# Patient Record
Sex: FEMALE | Race: WHITE | NOT HISPANIC OR LATINO | Employment: FULL TIME | URBAN - METROPOLITAN AREA
[De-identification: names, ages, dates, MRNs, and addresses within clinical notes are randomized per-mention and may not be internally consistent; named-entity substitution may affect disease eponyms.]

---

## 2017-03-26 ENCOUNTER — TELEPHONE (OUTPATIENT)
Dept: NURSING | Facility: CLINIC | Age: 42
End: 2017-03-26

## 2017-03-26 NOTE — TELEPHONE ENCOUNTER
Call Type: Triage Call    Presenting Problem: rectal bleeding.  'On friday I was leaking some  blood and pus from my butt. I thought it could be a hemorrhoid so I  soaked in a bath of epsom salts and started using preparation H on  Saturday and rested.  Today I am having some more bleeding again.'  Pt states the blood is bright red.  Protocol completed and care  advice given.  Disposition is to be seen within 2 weeks, pt will  schedule herself, declined assisance with appt.  Denies other  symptoms or trauma.  Denies cp, denies breathing problems, able to  care for self, scant amount, not saturating clothing, no clots in  toilet.  Never had this before.  Will follow care advice.  Triage Note:  Guideline Title: Gastrointestinal Bleeding  Recommended Disposition: See Provider within 2 Weeks  Original Inclination: Wanted to speak with a nurse  Override Disposition:  Intended Action: Follow advice given  Physician Contacted: No  New or recurring episodes of scant rectal bleeding (bright red blood on toilet  tissue or drops of blood in toilet water) AND not previously evaluated ?  YES  New or worsening signs and symptoms that may indicate shock ? NO  Vomited blood after nosebleed ? NO  Receiving or recently completed chemotherapy or radiation therapy AND more than  one episode of black or tarry stool, not blood streaked ? NO  Rectal foreign body with any amount of bright red bleeding ? NO  Following ingestion of toxic or caustic substance ? NO  Passing red, black or tarry material from rectum AND onset of new signs and  symptoms of hypovolemia ? NO  Unbearable abdominal/pelvic pain ? NO  Vomiting red, bloody or coffee-ground material, more than streaks of blood or  scant amount (not following nosebleed within past day) ? NO  Streaks of blood or scant blood in vomitus ? NO  One or more episodes of rectal bleeding (more than scant) and no symptoms of  hypovolemia ? NO  New change in bowel movements to black, tarry stool ?  NO  Unexplained, prolonged bleeding from gums AND not previously evaluated ? NO  New scant rectal bleeding associated with sharp rectal pain, lasting minutes to  hours, brought on by bowel movement AND not previously evaluated or not  responding to provider recommended treatment ? NO  Rectal bleeding or persistent rectal pain occuring after anal intercourse ? NO  Chest discomfort associated with shortness of breath, sweating, odd heartbeats or  different heart rate, nausea, vomiting, lightheadedness, or fainting lasting 5 or  more minutes now or within the last hour ? NO  Chest pain spreading to the shoulders, neck, jaw, in one or both arms, stomach or  back lasting 5 or more minutes now or within the last hour. Pain is NOT  associated with taking a deep breath or a productive cough, movement, or touch to  a localized area. ? NO  Pressure, fullness, squeezing sensation or pain anywhere in the chest lasting 5 or  more minutes now or within the last hour. Pain is NOT associated with taking a  deep breath or a productive cough, movement, or touch to a localized area on the  chest. ? NO  Bloody diarrhea AND has not been evaluated ? NO  Bleeding began after any gastrointestinal (GI) surgery or procedure and is lasting  longer than defined in provider specified discharge information ? NO  History of esophageal varices AND more than one episode of black or tarry stool ?  NO  Eight hours or less following abdominal or rectal injury AND having any amount  bright red rectal bleeding ? NO  Physician Instructions:  Care Advice: Call provider immediately if develop larger amounts of rectal  bleeding.  Constipation Care Measures:   - Drink 8 to 10 glasses of liquid per day,  more if breastfeeding.    - Drink warm water or coffee early in the  morning.   - Gradually increase dietary fiber (fresh fruits/vegetables,  whole grain bread and cereals). - As tolerated, walk 30 minutes at a steady  pace daily.    - Consider  nonprescription stool softeners (Colace) per  label, pharmacist or provider recommendations. Stool softners are not habit  forming as some stimulant laxatives may become.   - Consider  nonprescription bulk forming laxatives (such as Metamucil, FiberCon,  Citrucel, etc.)  follow package directions.  - Avoid routine use of strong  laxatives/enemas/suppositories unless ordered by provider.   - Do not delay  having bowel movement when having urge.   - Keep a routine  attempt bowel movement within half hour after a meal or after some  exercise.  Consider use of nonprescription anti-inflammatory topical medication per  pharmacist's or label recommendations (such as Preparation H, Anusol-HC,  Cortacet) to relieve itching or burning.  Apply small amount to area.

## 2017-04-04 ENCOUNTER — OFFICE VISIT (OUTPATIENT)
Dept: FAMILY MEDICINE | Facility: CLINIC | Age: 42
End: 2017-04-04
Payer: COMMERCIAL

## 2017-04-04 VITALS
SYSTOLIC BLOOD PRESSURE: 118 MMHG | HEART RATE: 77 BPM | HEIGHT: 66 IN | BODY MASS INDEX: 41.95 KG/M2 | RESPIRATION RATE: 18 BRPM | WEIGHT: 261 LBS | DIASTOLIC BLOOD PRESSURE: 76 MMHG | TEMPERATURE: 98.5 F | OXYGEN SATURATION: 99 %

## 2017-04-04 DIAGNOSIS — K62.5 RECTAL BLEEDING: Primary | ICD-10-CM

## 2017-04-04 DIAGNOSIS — L98.9 FACIAL LESION: ICD-10-CM

## 2017-04-04 DIAGNOSIS — E55.9 VITAMIN D DEFICIENCY: ICD-10-CM

## 2017-04-04 DIAGNOSIS — E66.01 MORBID OBESITY WITH BMI OF 40.0-44.9, ADULT (H): ICD-10-CM

## 2017-04-04 LAB
DEPRECATED CALCIDIOL+CALCIFEROL SERPL-MC: 15 UG/L (ref 20–75)
ERYTHROCYTE [DISTWIDTH] IN BLOOD BY AUTOMATED COUNT: 13.1 % (ref 10–15)
HCT VFR BLD AUTO: 43.2 % (ref 35–47)
HGB BLD-MCNC: 14.1 G/DL (ref 11.7–15.7)
MCH RBC QN AUTO: 30.5 PG (ref 26.5–33)
MCHC RBC AUTO-ENTMCNC: 32.6 G/DL (ref 31.5–36.5)
MCV RBC AUTO: 94 FL (ref 78–100)
PLATELET # BLD AUTO: 271 10E9/L (ref 150–450)
RBC # BLD AUTO: 4.62 10E12/L (ref 3.8–5.2)
WBC # BLD AUTO: 8.4 10E9/L (ref 4–11)

## 2017-04-04 PROCEDURE — 36415 COLL VENOUS BLD VENIPUNCTURE: CPT | Performed by: NURSE PRACTITIONER

## 2017-04-04 PROCEDURE — 85027 COMPLETE CBC AUTOMATED: CPT | Performed by: NURSE PRACTITIONER

## 2017-04-04 PROCEDURE — 82306 VITAMIN D 25 HYDROXY: CPT | Performed by: NURSE PRACTITIONER

## 2017-04-04 PROCEDURE — 99213 OFFICE O/P EST LOW 20 MIN: CPT | Performed by: NURSE PRACTITIONER

## 2017-04-04 NOTE — NURSING NOTE
"Chief Complaint   Patient presents with     Rectal Problem     Skin Check       Initial /76  Pulse 77  Temp 98.5  F (36.9  C) (Tympanic)  Resp 18  Ht 5' 6\" (1.676 m)  Wt 261 lb (118.4 kg)  SpO2 99%  BMI 42.13 kg/m2 Estimated body mass index is 42.13 kg/(m^2) as calculated from the following:    Height as of this encounter: 5' 6\" (1.676 m).    Weight as of this encounter: 261 lb (118.4 kg).  Medication Reconciliation: complete     Anastasiya Cabrera MA      "

## 2017-04-04 NOTE — MR AVS SNAPSHOT
After Visit Summary   4/4/2017    Leandra Chris    MRN: [id number]           Patient Information     Date Of Birth          1975        Visit Information        Provider Department      4/4/2017 7:30 AM Nelli Nye NP [facility]        Today's Diagnoses     Rectal bleeding    -  1    Facial lesion        Vitamin D deficiency           Follow-ups after your visit        Additional Services     COLORECTAL SURGERY REFERRAL       Your provider has referred you to: [facility]: [facility] - [place] [phone]   [email or web address]  [facility]: [facility] - [place] [phone]   [email or web address]  [place] [phone]   [email or web address]  [place] [phone]   [email or web address]  [place] [phone]   [email or web address]  [place] [phone]   [email or web address]  [place] [phone]   [email or web address]    Referral Reason(s): Rectal Bleeding  Special Concerns: None  This referral is: Elective (week +)  It is OK to leave a message on patient's voicemail.    Please be aware that coverage of these services is subject to the terms and limitations of your health insurance plan.  Call member services at your health plan with any benefit or coverage questions.      Please bring the following with you to your appointment:    (1) Any X-Rays, CTs or MRIs which have been performed.  Contact the facility where they were done to arrange for  prior to your scheduled appointment.    (2) List of current medications  (3) This referral request   (4) Any documents/labs given to you for this referral            DERMATOLOGY REFERRAL       Your provider has referred you to: [facility]: [facility] - [place] [phone]   [email or web address]  [place] [phone]    [email or web address]  [place] [phone]   [email or web address]  [place] [phone]   [email or web address]    Please be aware that coverage of these services is subject to the terms and limitations of your health insurance plan.  Call member services at your health plan with any benefit or coverage questions.      Please bring the following with you to your appointment:    (1) Any X-Rays, CTs or MRIs which have been performed.  Contact the facility where they were done to arrange for  prior to your scheduled appointment.    (2) List of current medications  (3) This referral request   (4) Any documents/labs given to you for this referral                  Your next 10 appointments already scheduled     Apr 12, 2017  6:30 PM CDT   [facility] Skin Evaluation with Nelli Nye NP   [facility]    [address] [place] [id number] [phone]              Who to contact     If you have questions or need follow up information about today's clinic visit or your schedule please contact [facility] directly at [phone].  Normal or non-critical lab and imaging results will be communicated to you by MyChart, letter or phone within 4 business days after the clinic has received the results. If you do not hear from us within 7 days, please contact the clinic through [facility] or phone. If you have a critical or abnormal lab result, we will notify you by phone as soon as possible.  Submit refill requests through [facility] or call your pharmacy and they will forward the refill request to us. Please allow 3 business days for your refill to be completed.          Additional Information About Your Visit        [facility] Information     [facility] gives you secure access to your electronic health record. If you see a primary care provider, you can also send messages to your care  "team and make appointments. If you have questions, please call your primary care clinic.  If you do not have a primary care provider, please call [phone] and they will assist you.        Care EveryWhere ID     This is your Care EveryWhere ID. This could be used by other organizations to access your [place] medical records  [id number]        Your Vitals Were     Pulse Temperature Respirations Height Pulse Oximetry BMI (Body Mass Index)    77 98.5  F (36.9  C) (Tympanic) 18 5' 6\" (1.676 m) 99% 42.13 kg/m2       Blood Pressure from Last 3 Encounters:   04/04/17 118/76   08/22/16 114/67   07/21/16 111/74    Weight from Last 3 Encounters:   04/04/17 261 lb (118.4 kg)   08/22/16 259 lb 8 oz (117.7 kg)   07/21/16 258 lb (117 kg)              We Performed the Following     CBC with platelets     COLORECTAL SURGERY REFERRAL     DERMATOLOGY REFERRAL     Vitamin D Deficiency        Primary Care Provider Office Phone # Fax #    Nelli Nye [phone]       [facility] [address] [address]  [facility] [address]        Thank you!     Thank you for choosing [facility]  for your care. Our goal is always to provide you with excellent care. Hearing back from our patients is one way we can continue to improve our services. Please take a few minutes to complete the written survey that you may receive in the mail after your visit with us. Thank you!             Your Updated Medication List - Protect others around you: Learn how to safely use, store and throw away your medicines at www.disposemymeds.org.          This list is accurate as of: 4/4/17  8:11 AM.  Always use your most recent med list.                   Brand Name Dispense Instructions for use    albuterol 108 (90 BASE) MCG/ACT Inhaler    PROAIR HFA/PROVENTIL HFA/VENTOLIN HFA    1 Inhaler    Inhale 2 puffs into the lungs every 6 hours as needed for shortness of breath / dyspnea or wheezing       ALPRAZolam 0.5 MG " tablet    XANAX    10 tablet    Take 1-2 tablets 45 minutes prior to flight       butalbital-aspirin-caffeine -40 MG per capsule    FIORINAL    90 capsule    Take 1 capsule by mouth every 4 hours as needed for pain       IBUPROFEN PO      Reported on 4/4/2017       metFORMIN 500 MG 24 hr tablet    GLUCOPHAGE-XR    180 tablet    Take 2 tablets (1,000 mg) by mouth daily (with breakfast)       order for DME      Reported on 4/4/2017       TYLENOL PO      Take by mouth every 4 hours as needed for mild pain or fever Reported on 4/4/2017

## 2017-04-05 ENCOUNTER — PRE VISIT (OUTPATIENT)
Dept: SURGERY | Facility: CLINIC | Age: 42
End: 2017-04-05

## 2017-04-05 NOTE — TELEPHONE ENCOUNTER
1.  Date/reason for appt: 4/10/17 - Rectal Bleeding  2.  Referring provider: Nelli Nye NP  3.  Call to patient (Yes / No - short description): No, pt is referred  4.  Previous care at / records requested from: [facility] -- Records and referral in Epic

## 2017-04-07 ASSESSMENT — ENCOUNTER SYMPTOMS
ARTHRALGIAS: 0
DIARRHEA: 0
RECTAL PAIN: 0
VOMITING: 0
RECTAL BLEEDING: 1
BLOATING: 0
HEARTBURN: 0
NECK PAIN: 0
JAUNDICE: 0
POOR WOUND HEALING: 0
MYALGIAS: 1
JOINT SWELLING: 0
MUSCLE CRAMPS: 0
NAUSEA: 1
BLOOD IN STOOL: 1
STIFFNESS: 0
ABDOMINAL PAIN: 0
SKIN CHANGES: 1
BOWEL INCONTINENCE: 0
CONSTIPATION: 0
MUSCLE WEAKNESS: 0
NAIL CHANGES: 0
BACK PAIN: 0

## 2017-04-10 ENCOUNTER — OFFICE VISIT (OUTPATIENT)
Dept: SURGERY | Facility: CLINIC | Age: 42
End: 2017-04-10

## 2017-04-10 VITALS
HEIGHT: 66 IN | WEIGHT: 263.4 LBS | DIASTOLIC BLOOD PRESSURE: 79 MMHG | BODY MASS INDEX: 42.33 KG/M2 | HEART RATE: 75 BPM | SYSTOLIC BLOOD PRESSURE: 128 MMHG | OXYGEN SATURATION: 95 %

## 2017-04-10 DIAGNOSIS — K60.30 ANAL FISTULA: Primary | ICD-10-CM

## 2017-04-10 ASSESSMENT — PAIN SCALES - GENERAL: PAINLEVEL: NO PAIN (0)

## 2017-04-10 NOTE — NURSING NOTE
"Chief Complaint   Patient presents with     Consult     Rectal bleeding       Vitals:    04/10/17 1536   BP: 128/79   BP Location: Left arm   Pulse: 75   SpO2: 95%   Weight: 263 lb 6.4 oz   Height: 5' 6\"       Body mass index is 42.51 kg/(m^2).    Garrett Zhang CMA                          "

## 2017-04-10 NOTE — PROGRESS NOTES
Colon and Rectal Surgery Consult Clinic Note    Date: 4/10/2017     Referring provider:  Nelli Nye NP  [facility]  [address] [place] [address]     RE: Leandra Chris  : 1975  CIRILO: 4/10/2017    Leandra Chris is a very pleasant 42 year old female without a significant past medical history with a recent diagnosis of rectal bleeding.  Given these findings they were subsequently sent to the Colon and Rectal Surgery Clinic for an opinion on this and a new patient consultation.     Ms. Chris reports developing some pain in her right buttock a few weeks ago. She then became nauseated. She went to the bathroom to void and found a moderate amount of blood and purulent drainage in her underwear. Her pain and nausea then subsided. It took a few days for the bleeding and drainage to stop but she has not had any bleeding or pain since. She has never had anything like this in the past. She has normal bowel movements.   She has a family history of her great uncle with stomach cancer. She denies any family history of inflammatory bowel disease. She has never had a colonoscopy.    Assessment/Plan: 42 year old female with perianal abscess and likely anal fistula. On exam she has a small skin tag in the anterior midline just outside the anal verge with a small external opening. Pressure anterior to this results in a moderate amount of purulent drainage and blood. I am able to gently probe this toward the vagina about 1 cm. No underlying fluctuance or induration. No visible internal opening. We discussed surgical management including possible fistulotomy and possible seton drain placement. 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 Discussed the risk of getting another abscess in the meantime and that I would want her to return to clinic if she has any increased pressure, pain, fevers, or chills. She would like to  discuss with her family before she makes a decision on timing. If she wants to wait closer to the summer, I would like her to return to clinic for an exam about one month prior to when she would like the procedure to reevaluate.   Patient's questions were answered to her stated satisfaction and she is in agreement with this plan.    Medical history:  Past Medical History:   Diagnosis Date     Depressive disorder      LSIL (low grade squamous intraepithelial lesion) on Pap smear 2008    colp neg     Migraines      PCOS (polycystic ovarian syndrome)      Pneumonia        Surgical history:  Past Surgical History:   Procedure Laterality Date     NO HISTORY OF SURGERY         Problem list:    Patient Active Problem List    Diagnosis Date Noted     BMI of 40.0-44.9, adult (H) 04/04/2017     Priority: Medium     Migraine headache 10/21/2011     Priority: Medium     Obesity 10/21/2011     Priority: Medium     CARDIOVASCULAR SCREENING; LDL GOAL LESS THAN 160 10/31/2010     Priority: Medium     Insomnia 07/08/2010     Priority: Medium     Anxiety 04/09/2009     Priority: Medium     Vitamin D deficiency 04/09/2009     Priority: Medium     PCOS (polycystic ovarian syndrome) 02/20/2009     Priority: Medium       Medications:  Current Outpatient Prescriptions   Medication Sig Dispense Refill     cholecalciferol (VITAMIN D3) 24621 UNITS capsule Take 1 capsule (50,000 Units) by mouth once a week for 12 doses 12 capsule 0     butalbital-aspirin-caffeine (FIORINAL) -40 MG per capsule Take 1 capsule by mouth every 4 hours as needed for pain 90 capsule 1     metFORMIN (GLUCOPHAGE-XR) 500 MG 24 hr tablet Take 2 tablets (1,000 mg) by mouth daily (with breakfast) 180 tablet 0     ALPRAZolam (XANAX) 0.5 MG tablet Take 1-2 tablets 45 minutes prior to flight 10 tablet 0     IBUPROFEN PO Reported on 4/4/2017       albuterol (PROAIR HFA, PROVENTIL HFA, VENTOLIN HFA) 108 (90 BASE) MCG/ACT inhaler Inhale 2 puffs into the lungs every 6  "hours as needed for shortness of breath / dyspnea or wheezing 1 Inhaler 1     Acetaminophen (TYLENOL PO) Take by mouth every 4 hours as needed for mild pain or fever Reported on 4/4/2017       ORDER FOR DME Reported on 4/4/2017         Allergies:  Allergies   Allergen Reactions     Penicillin [Esters]      Trazodone      Other reaction(s): Headache  Pt reports after taking trazodone she had a migraine requiring IM medication     Shellfish-Derived Products Rash       Family history:  Family History   Problem Relation Age of Onset     Prostate Cancer Father      Hypertension Maternal Grandmother      CEREBROVASCULAR DISEASE Maternal Grandmother      Substance Abuse Brother      Asthma No family hx of      C.A.D. No family hx of      DIABETES No family hx of      Breast Cancer No family hx of      Cancer - colorectal No family hx of        Social history:  Social History   Substance Use Topics     Smoking status: Former Smoker     Packs/day: 0.50     Years: 13.00     Types: Cigarettes     Start date: 1/1/1999     Quit date: 2/1/2012     Smokeless tobacco: Never Used     Alcohol use Yes      Comment: rarely    Marital status: single.    Nursing Notes:   Garrett Zhang CMA  4/10/2017  3:39 PM  Signed  Chief Complaint   Patient presents with     Consult     Rectal bleeding       Vitals:    04/10/17 1536   BP: 128/79   BP Location: Left arm   Pulse: 75   SpO2: 95%   Weight: 263 lb 6.4 oz   Height: 5' 6\"       Body mass index is 42.51 kg/(m^2).    Garrett Zhang CMA                             Physical Examination:  /79 (BP Location: Left arm)  Pulse 75  Ht 5' 6\"  Wt 263 lb 6.4 oz  SpO2 95%  BMI 42.51 kg/m2  General: alert, oriented, in no acute distress, sitting comfortably  HEENT: mucous membranes moist  Perianal external examination:  Perianal skin: Intact with no excoriation or lichenification.  Lesions: Yes: small external opening in the anterior position next to small skin tag. This can be gently " probed toward the vagina resulting in a moderate amount of purulent drainage and bleeding.   Eversion of buttocks: There was not evidence of an anal fissure. Details: N/A.  Skin tags or external hemorrhoids: Yes: small anterior midline anal skin tag.  Digital rectal examination: Was performed.   Sphincter tone: Good.  Palpable lesions: No.  Other: None.  Bimanual examination: was not performed.    Anoscopy: Was performed.   Hemorrhoids: No significant internal hemorrhoids.  Lesions: No visible internal fistula opening.    Total face to face time was 30 minutes, >50% counseling.    EDISON Shaikh, NP-C  Colon and Rectal Surgery   [facility]    This note was created using speech recognition software and may contain unintended word substitutions.

## 2017-04-10 NOTE — LETTER
4/10/2017      RE: Leandra Chris  [address]  [place] [address]       Colon and Rectal Surgery Consult Clinic Note    Date: 4/10/2017     Referring provider:  Nelli Nye NP  [facility]  [address] [place] [address]     RE: Leandra Chris  : 1975  CIRILO: 4/10/2017    Leandra Chris is a very pleasant 42 year old female without a significant past medical history with a recent diagnosis of rectal bleeding.  Given these findings they were subsequently sent to the Colon and Rectal Surgery Clinic for an opinion on this and a new patient consultation.     Ms. Chris reports developing some pain in her right buttock a few weeks ago. She then became nauseated. She went to the bathroom to void and found a moderate amount of blood and purulent drainage in her underwear. Her pain and nausea then subsided. It took a few days for the bleeding and drainage to stop but she has not had any bleeding or pain since. She has never had anything like this in the past. She has normal bowel movements.   She has a family history of her great uncle with stomach cancer. She denies any family history of inflammatory bowel disease. She has never had a colonoscopy.    Assessment/Plan: 42 year old female with perianal abscess and likely anal fistula. On exam she has a small skin tag in the anterior midline just outside the anal verge with a small external opening. Pressure anterior to this results in a moderate amount of purulent drainage and blood. I am able to gently probe this toward the vagina about 1 cm. No underlying fluctuance or induration. No visible internal opening. We discussed surgical management including possible fistulotomy and possible seton drain placement. 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 Discussed the risk of getting another abscess in the meantime and that I would want her to return to clinic  if she has any increased pressure, pain, fevers, or chills. She would like to discuss with her family before she makes a decision on timing. If she wants to wait closer to the summer, I would like her to return to clinic for an exam about one month prior to when she would like the procedure to reevaluate.   Patient's questions were answered to her stated satisfaction and she is in agreement with this plan.    Medical history:  Past Medical History:   Diagnosis Date     Depressive disorder      LSIL (low grade squamous intraepithelial lesion) on Pap smear 2008    colp neg     Migraines      PCOS (polycystic ovarian syndrome)      Pneumonia        Surgical history:  Past Surgical History:   Procedure Laterality Date     NO HISTORY OF SURGERY         Problem list:    Patient Active Problem List    Diagnosis Date Noted     BMI of 40.0-44.9, adult (H) 04/04/2017     Priority: Medium     Migraine headache 10/21/2011     Priority: Medium     Obesity 10/21/2011     Priority: Medium     CARDIOVASCULAR SCREENING; LDL GOAL LESS THAN 160 10/31/2010     Priority: Medium     Insomnia 07/08/2010     Priority: Medium     Anxiety 04/09/2009     Priority: Medium     Vitamin D deficiency 04/09/2009     Priority: Medium     PCOS (polycystic ovarian syndrome) 02/20/2009     Priority: Medium       Medications:  Current Outpatient Prescriptions   Medication Sig Dispense Refill     cholecalciferol (VITAMIN D3) 65120 UNITS capsule Take 1 capsule (50,000 Units) by mouth once a week for 12 doses 12 capsule 0     butalbital-aspirin-caffeine (FIORINAL) -40 MG per capsule Take 1 capsule by mouth every 4 hours as needed for pain 90 capsule 1     metFORMIN (GLUCOPHAGE-XR) 500 MG 24 hr tablet Take 2 tablets (1,000 mg) by mouth daily (with breakfast) 180 tablet 0     ALPRAZolam (XANAX) 0.5 MG tablet Take 1-2 tablets 45 minutes prior to flight 10 tablet 0     IBUPROFEN PO Reported on 4/4/2017       albuterol (PROAIR HFA, PROVENTIL HFA, VENTOLIN  "HFA) 108 (90 BASE) MCG/ACT inhaler Inhale 2 puffs into the lungs every 6 hours as needed for shortness of breath / dyspnea or wheezing 1 Inhaler 1     Acetaminophen (TYLENOL PO) Take by mouth every 4 hours as needed for mild pain or fever Reported on 4/4/2017       ORDER FOR DME Reported on 4/4/2017         Allergies:  Allergies   Allergen Reactions     Penicillin [Esters]      Trazodone      Other reaction(s): Headache  Pt reports after taking trazodone she had a migraine requiring IM medication     Shellfish-Derived Products Rash       Family history:  Family History   Problem Relation Age of Onset     Prostate Cancer Father      Hypertension Maternal Grandmother      CEREBROVASCULAR DISEASE Maternal Grandmother      Substance Abuse Brother      Asthma No family hx of      C.A.D. No family hx of      DIABETES No family hx of      Breast Cancer No family hx of      Cancer - colorectal No family hx of        Social history:  Social History   Substance Use Topics     Smoking status: Former Smoker     Packs/day: 0.50     Years: 13.00     Types: Cigarettes     Start date: 1/1/1999     Quit date: 2/1/2012     Smokeless tobacco: Never Used     Alcohol use Yes      Comment: rarely    Marital status: single.    Nursing Notes:   Garrett Zhang CMA  4/10/2017  3:39 PM  Signed  Chief Complaint   Patient presents with     Consult     Rectal bleeding       Vitals:    04/10/17 1536   BP: 128/79   BP Location: Left arm   Pulse: 75   SpO2: 95%   Weight: 263 lb 6.4 oz   Height: 5' 6\"       Body mass index is 42.51 kg/(m^2).    Garrett Zhang CMA                             Physical Examination:  /79 (BP Location: Left arm)  Pulse 75  Ht 5' 6\"  Wt 263 lb 6.4 oz  SpO2 95%  BMI 42.51 kg/m2  General: alert, oriented, in no acute distress, sitting comfortably  HEENT: mucous membranes moist  Perianal external examination:  Perianal skin: Intact with no excoriation or lichenification.  Lesions: Yes: small external opening " in the anterior position next to small skin tag. This can be gently probed toward the vagina resulting in a moderate amount of purulent drainage and bleeding.   Eversion of buttocks: There was not evidence of an anal fissure. Details: N/A.  Skin tags or external hemorrhoids: Yes: small anterior midline anal skin tag.  Digital rectal examination: Was performed.   Sphincter tone: Good.  Palpable lesions: No.  Other: None.  Bimanual examination: was not performed.    Anoscopy: Was performed.   Hemorrhoids: No significant internal hemorrhoids.  Lesions: No visible internal fistula opening.    Total face to face time was 30 minutes, >50% counseling.    EDISON Shaikh, NP-C  Colon and Rectal Surgery   [facility]    This note was created using speech recognition software and may contain unintended word substitutions.      EDISON Shaikh CNP

## 2017-04-10 NOTE — MR AVS SNAPSHOT
"              After Visit Summary   4/10/2017    Leandra Chris    MRN: [id number]           Patient Information     Date Of Birth          1975        Visit Information        Provider Department      4/10/2017 4:00 PM Maday Zepeda APRN [facility] Colon and Rectal Surgery        Today's Diagnoses     Anal fistula    -  1       Follow-ups after your visit        Who to contact     Please call your clinic at [phone] to:    Ask questions about your health    Make or cancel appointments    Discuss your medicines    Learn about your test results    Speak to your doctor   If you have compliments or concerns about an experience at your clinic, or if you wish to file a complaint, please contact [facility] Patient Relations at [phone] or email us at [email or web address]         Additional Information About Your Visit        MyChart Information     "[employer]"t gives you secure access to your electronic health record. If you see a primary care provider, you can also send messages to your care team and make appointments. If you have questions, please call your primary care clinic.  If you do not have a primary care provider, please call [phone] and they will assist you.      [employer] is an electronic gateway that provides easy, online access to your medical records. With [employer], you can request a clinic appointment, read your test results, renew a prescription or communicate with your care team.     To access your existing account, please contact your [facility] Clinic or call [phone] for assistance.        Care EveryWhere ID     This is your Care EveryWhere ID. This could be used by other organizations to access your [facility] medical records  [id number]        Your Vitals Were     Pulse Height Pulse Oximetry BMI (Body Mass Index)          75 5' 6\" 95% 42.51 kg/m2         Blood Pressure from Last 3 Encounters:   04/10/17 " 128/79   04/04/17 118/76   08/22/16 114/67    Weight from Last 3 Encounters:   04/10/17 263 lb 6.4 oz   04/04/17 261 lb   08/22/16 259 lb 8 oz              We Performed the Following     ANOSCOPY W/WO BRUSH/WASH        Primary Care Provider Office Phone # Fax #    Nelli CHAU RITA Nye [phone]       [facility] [address] [place]  [facility] [address]        Thank you!     Thank you for choosing [facility] COLON AND RECTAL SURGERY  for your care. Our goal is always to provide you with excellent care. Hearing back from our patients is one way we can continue to improve our services. Please take a few minutes to complete the written survey that you may receive in the mail after your visit with us. Thank you!             Your Updated Medication List - Protect others around you: Learn how to safely use, store and throw away your medicines at www.disposemymeds.org.          This list is accurate as of: 4/10/17  4:33 PM.  Always use your most recent med list.                   Brand Name Dispense Instructions for use    albuterol 108 (90 BASE) MCG/ACT Inhaler    PROAIR HFA/PROVENTIL HFA/VENTOLIN HFA    1 Inhaler    Inhale 2 puffs into the lungs every 6 hours as needed for shortness of breath / dyspnea or wheezing       ALPRAZolam 0.5 MG tablet    XANAX    10 tablet    Take 1-2 tablets 45 minutes prior to flight       butalbital-aspirin-caffeine -40 MG per capsule    FIORINAL    90 capsule    Take 1 capsule by mouth every 4 hours as needed for pain       cholecalciferol 37417 UNITS capsule    VITAMIN D3    12 capsule    Take 1 capsule (50,000 Units) by mouth once a week for 12 doses       IBUPROFEN PO      Reported on 4/4/2017       metFORMIN 500 MG 24 hr tablet    GLUCOPHAGE-XR    180 tablet    Take 2 tablets (1,000 mg) by mouth daily (with breakfast)       order for DME      Reported on 4/4/2017       TYLENOL PO      Take by mouth every 4 hours as needed for mild pain or fever Reported  on 4/4/2017

## 2017-04-13 ENCOUNTER — TELEPHONE (OUTPATIENT)
Dept: SURGERY | Facility: CLINIC | Age: 42
End: 2017-04-13

## 2017-04-13 NOTE — TELEPHONE ENCOUNTER
Patient left a message requesting to schedule surgery.  Called patient at number provided, and left a message with my direct number to schedule.

## 2017-04-21 ENCOUNTER — TELEPHONE (OUTPATIENT)
Dept: SURGERY | Facility: CLINIC | Age: 42
End: 2017-04-21

## 2017-04-21 NOTE — TELEPHONE ENCOUNTER
Patient left a message returning my call.  Called patient and left a message with my direct number to schedule.

## 2017-05-10 ENCOUNTER — TELEPHONE (OUTPATIENT)
Dept: SURGERY | Facility: CLINIC | Age: 42
End: 2017-05-10

## 2017-05-10 DIAGNOSIS — K60.30 ANAL FISTULA: Primary | ICD-10-CM

## 2017-05-10 NOTE — TELEPHONE ENCOUNTER
Patient called to schedule her surgery.  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  Patient states Maday Riley NP wanted to see her prior to surgery for a check up.  Patient is scheduled 06/21/17 at 4:45 pm.  Patient is also scheduled to see PAC same day at 3:00 pm.  Informed patient I will send a surgery packet with all information.  Patient confirms and has my direct number for questions or concerns.

## 2017-05-11 ENCOUNTER — HOSPITAL ENCOUNTER (OUTPATIENT)
Facility: CLINIC | Age: 42
End: 2017-05-11
Attending: COLON & RECTAL SURGERY | Admitting: COLON & RECTAL SURGERY
Payer: COMMERCIAL

## 2017-05-27 DIAGNOSIS — E28.2 PCOS (POLYCYSTIC OVARIAN SYNDROME): ICD-10-CM

## 2017-05-29 ENCOUNTER — TELEPHONE (OUTPATIENT)
Dept: NURSING | Facility: CLINIC | Age: 42
End: 2017-05-29

## 2017-05-30 ENCOUNTER — OFFICE VISIT (OUTPATIENT)
Dept: FAMILY MEDICINE | Facility: CLINIC | Age: 42
End: 2017-05-30
Payer: COMMERCIAL

## 2017-05-30 VITALS
WEIGHT: 262 LBS | BODY MASS INDEX: 42.29 KG/M2 | HEART RATE: 76 BPM | RESPIRATION RATE: 18 BRPM | SYSTOLIC BLOOD PRESSURE: 106 MMHG | TEMPERATURE: 97.9 F | DIASTOLIC BLOOD PRESSURE: 70 MMHG | OXYGEN SATURATION: 95 %

## 2017-05-30 DIAGNOSIS — L72.3 SEBACEOUS CYST: ICD-10-CM

## 2017-05-30 DIAGNOSIS — L02.11 ABSCESS OF NECK: Primary | ICD-10-CM

## 2017-05-30 DIAGNOSIS — E28.2 PCOS (POLYCYSTIC OVARIAN SYNDROME): ICD-10-CM

## 2017-05-30 PROCEDURE — 10060 I&D ABSCESS SIMPLE/SINGLE: CPT | Performed by: NURSE PRACTITIONER

## 2017-05-30 PROCEDURE — 99213 OFFICE O/P EST LOW 20 MIN: CPT | Mod: 25 | Performed by: NURSE PRACTITIONER

## 2017-05-30 RX ORDER — METFORMIN HCL 500 MG
1000 TABLET, EXTENDED RELEASE 24 HR ORAL
Qty: 180 TABLET | Status: SHIPPED | OUTPATIENT
Start: 2017-05-30 | End: 2018-06-16

## 2017-05-30 RX ORDER — METFORMIN HCL 500 MG
TABLET, EXTENDED RELEASE 24 HR ORAL
Qty: 180 TABLET | Refills: 0 | OUTPATIENT
Start: 2017-05-30

## 2017-05-30 NOTE — NURSING NOTE
"Chief Complaint   Patient presents with     Mass     bump on neck       Initial /70  Pulse 76  Temp 97.9  F (36.6  C) (Tympanic)  Resp 18  Wt 262 lb (118.8 kg)  SpO2 95%  BMI 42.29 kg/m2 Estimated body mass index is 42.29 kg/(m^2) as calculated from the following:    Height as of 4/10/17: 5' 6\" (1.676 m).    Weight as of this encounter: 262 lb (118.8 kg).  Medication Reconciliation: complete     Anastasiya Cabrera MA      "

## 2017-05-30 NOTE — PROGRESS NOTES
SUBJECTIVE:                                                    Leandra Chris is a 42 year old female who presents to clinic today for the following health issues:      Bump on Neck      Duration: Since it has been getting bigger since last Thursday     Description (location/character/radiation): Bump on Left side of neck: getting sore and has increased in size    Intensity:  mild    Accompanying signs and symptoms: none     Therapies tried and outcome: Ibuprofen and warm compress      No fevers.  No recent URI symptoms.  She has had a likely cyst for years, but recently it started getting bigger.  Now a bit red and tender.  No drainage.         Problem list and histories reviewed & adjusted, as indicated.  Additional history: as documented        Reviewed and updated as needed this visit by clinical staff       Reviewed and updated as needed this visit by Provider         ROS:  C: NEGATIVE for fever, chills, change in weight  INTEGUMENTARY/SKIN: see HPI  E/M: NEGATIVE for ear, mouth and throat problems  R: NEGATIVE for significant cough or SOB  CV: NEGATIVE for chest pain, palpitations or peripheral edema  GI: NEGATIVE for nausea, abdominal pain, heartburn, or change in bowel habits    OBJECTIVE:                                                    /70  Pulse 76  Temp 97.9  F (36.6  C) (Tympanic)  Resp 18  Wt 262 lb (118.8 kg)  SpO2 95%  BMI 42.29 kg/m2  Body mass index is 42.29 kg/(m^2).  GENERAL: healthy, alert and no distress  HENT: ear canals and TM's normal, nose and mouth without ulcers or lesions  NECK: no adenopathy, no asymmetry, masses, or scars and thyroid normal to palpation  RESP: lungs clear to auscultation - no rales, rhonchi or wheezes  CV: regular rate and rhythm, normal S1 S2, no S3 or S4, no murmur, click or rub, no peripheral edema and peripheral pulses strong  SKIN: approx 3 cm fluctuant mass left posterior neck with a mild amount of erythema         ASSESSMENT/PLAN:                                                             1. Abscess of neck  After discussing procedure and potential risks, informed consent was obtained.  Area was cleansed with Betadine and anesthetized with Lidocaine 1% with epi.  Using sterile procedures, an incision was made with an 11 blade scapel and the abscess was drained.  The wound  covered with a dressing.  Discussed frequent warm compresses.  Follow up if develops a fever, increased redness, pain.     - DRAIN SKIN ABSCESS SIMPLE/SINGLE    2. Sebaceous cyst  Discussed getting cyst removed in future if desired.     3. PCOS (polycystic ovarian syndrome)  Refills given.   - metFORMIN (GLUCOPHAGE-XR) 500 MG 24 hr tablet; Take 2 tablets (1,000 mg) by mouth daily (with breakfast)  Dispense: 180 tablet; Refill: CHRISTINE Nye NP  [facility]

## 2017-05-30 NOTE — MR AVS SNAPSHOT
After Visit Summary   5/30/2017    Leandra Chris    MRN: [id number]           Patient Information     Date Of Birth          1975        Visit Information        Provider Department      5/30/2017 11:30 AM Nelli Nye NP [facility]        Today's Diagnoses     Abscess of neck    -  1    Sebaceous cyst        PCOS (polycystic ovarian syndrome)           Follow-ups after your visit        Your next 10 appointments already scheduled     Jun 12, 2017 10:30 AM CDT   [facility] Call with Nelli Nye NP   [facility]    [address] [facility] [facility] [address]   [phone]            Jun 21, 2017  3:00 PM CDT   (Arrive by 2:45 PM)   PAC EVALUATION with  Pac Sofia 7   [facility] ([facility])    [address] [place] [address]   [phone]            Jun 21, 2017  4:00 PM CDT   (Arrive by 3:45 PM)   PAC RN ASSESSMENT with  Pac Rn   [facility] ([facility])    [address] [place] [address]   [phone]            Jun 21, 2017  4:30 PM CDT   (Arrive by 4:15 PM)   PAC Anesthesia Consult with  Pac Anesthesiologist   [facility] [place] ([facility])    [address] [place] [address]   [phone]            Jun 21, 2017  4:45 PM CDT   (Arrive by 4:30 PM)   Return Visit with EDISON Varghese CNP   [facility] Colon and Rectal Surgery ([facility] [place])    [address] [place] [address]   [phone]            Jul 05, 2017   Procedure with Ramone Pandey MD   [facility] [place], Same Day Surgery (--)    [address] [address] [phone]              Who to contact     If you have questions or need follow up  information about today's clinic visit or your schedule please contact [facility] directly at [phone].  Normal or non-critical lab and imaging results will be communicated to you by MyChart, letter or phone within 4 business days after the clinic has received the results. If you do not hear from us within 7 days, please contact the clinic through [employer] or phone. If you have a critical or abnormal lab result, we will notify you by phone as soon as possible.  Submit refill requests through [employer] or call your pharmacy and they will forward the refill request to us. Please allow 3 business days for your refill to be completed.          Additional Information About Your Visit        [employer] Information     [employer] gives you secure access to your electronic health record. If you see a primary care provider, you can also send messages to your care team and make appointments. If you have questions, please call your primary care clinic.  If you do not have a primary care provider, please call [phone] and they will assist you.        Care EveryWhere ID     This is your Care EveryWhere ID. This could be used by other organizations to access your [facility] medical records  [id number]        Your Vitals Were     Pulse Temperature Respirations Pulse Oximetry BMI (Body Mass Index)       76 97.9  F (36.6  C) (Tympanic) 18 95% 42.29 kg/m2        Blood Pressure from Last 3 Encounters:   05/30/17 106/70   04/10/17 128/79   04/04/17 118/76    Weight from Last 3 Encounters:   05/30/17 262 lb (118.8 kg)   04/10/17 263 lb 6.4 oz (119.5 kg)   04/04/17 261 lb (118.4 kg)              We Performed the Following     DRAIN SKIN ABSCESS SIMPLE/SINGLE          Where to get your medicines      These medications were sent to [id number] - [place], [place] - [address], [place] [address]     Phone:  [phone]      metFORMIN 500 MG 24 hr tablet          Primary Care Provider Office Phone # Fax #    Nelli CHAU RITA Nye [phone]       [facility] [address]  [facility] [address]        Thank you!     Thank you for choosing [facility]  for your care. Our goal is always to provide you with excellent care. Hearing back from our patients is one way we can continue to improve our services. Please take a few minutes to complete the written survey that you may receive in the mail after your visit with us. Thank you!             Your Updated Medication List - Protect others around you: Learn how to safely use, store and throw away your medicines at www.disposemymeds.org.          This list is accurate as of: 5/30/17 12:55 PM.  Always use your most recent med list.                   Brand Name Dispense Instructions for use    albuterol 108 (90 BASE) MCG/ACT Inhaler    PROAIR HFA/PROVENTIL HFA/VENTOLIN HFA    1 Inhaler    Inhale 2 puffs into the lungs every 6 hours as needed for shortness of breath / dyspnea or wheezing       ALPRAZolam 0.5 MG tablet    XANAX    10 tablet    Take 1-2 tablets 45 minutes prior to flight       butalbital-aspirin-caffeine -40 MG per capsule    FIORINAL    90 capsule    Take 1 capsule by mouth every 4 hours as needed for pain       cholecalciferol 45586 UNITS capsule    VITAMIN D3    12 capsule    Take 1 capsule (50,000 Units) by mouth once a week for 12 doses       IBUPROFEN PO      Reported on 4/4/2017       metFORMIN 500 MG 24 hr tablet    GLUCOPHAGE-XR    180 tablet    Take 2 tablets (1,000 mg) by mouth daily (with breakfast)       order for DME      Reported on 4/4/2017       TYLENOL PO      Take by mouth every 4 hours as needed for mild pain or fever Reported on 4/4/2017

## 2017-05-30 NOTE — TELEPHONE ENCOUNTER
"Call Type: Triage Call    Presenting Problem: \"I have a lump on the back of my neck.  I have  had this for several years and it was the size of a pea until this  weekend.  It's now larger in size, red, and tender.\"  Skin lesions  guideline used, advised Leandra to be seen within 8 hours.  She  states she called around to urgent cares and nothing is open close  to her.  She prefers to wait until tomorrow and be seen by her pcp.  Transferred to scheduling.  Triage Note:  Guideline Title: Skin Lesions  Recommended Disposition: See Provider within 8 Hours  Original Inclination: Wanted to speak with a nurse  Override Disposition:  Intended Action: Go to Urgent Care Center  Physician Contacted: No  Any new OR worsening signs and symptoms of soft tissue infection ?  YES  Thermal or chemical burn ? NO  Blisters on mouth OR surrounding area ? NO  Known or suspected exposure to Poison [place], [place] OR [place] ? NO  Skin tear(s) caused by friction/shear injury ? NO  Previously confirmed diagnosis of athlete's foot and similar symptoms ? NO  One or more enlarged or tender lymph nodes ? NO  Associated with new onset wheezing or difficulty breathing ? NO  Exposure to , e.g., Super Glue ? NO  Possible exposure to chickenpox or has rash that looks like chickenpox ? NO  Female with any rash, warts or sores on perineal/perianal area. ? NO  Any painful blisters on perineal/perianal area. ? NO  Male with rash, warts, or sores on penis. ? NO  Male with rash, warts, or sores on scrotum/genital area. ? NO  Non-blister lesions on mouth, lip, tongue ? NO  Skin changes that looks like hives (itchy welts or wheals) ? NO  Large areas of peeling skin or new onset of large blisters over body not related  to sun/UVB exposure ? NO  Foot or toe skin lesions complicated by diagnosed diabetes mellitus. ? NO  Localized or widespread rash that does not have the appearance of hives (itchy  welts or wheals) ? NO  Any temperature elevation in an " immunocompromised individual OR frail elderly with  signs of dehydration ? NO  Known herpes zoster or undiagnosed blister-like rash on or near eye ? NO  Physician Instructions:  Care Advice: Apply local moist heat (such as a warm, wet wash cloth or small  towel covered with plastic wrap) to the area for 15-20 minutes every 2-3  hours while awake.  Analgesic/Antipyretic Advice - NSAIDs: Consider aspirin, ibuprofen,  naproxen or ketoprofen for pain or fever as directed on label or by  pharmacist/provider. PRECAUTIONS: - You should not take this medicine for  more than 10 days unless recommended by your provider. EXCEPTIONS: - Should  not be used if taking blood thinners or have bleeding problems. - Do not  use if have history of sensitivity/allergy to any of these medications  or history of cardiovascular, ulcer, kidney, liver disease or diabetes  unless approved by provider. - Do not exceed recommended dose or frequency.  Analgesic/Antipyretic Advice - Acetaminophen: Consider acetaminophen as  directed on label or by pharmacist/provider for pain or fever. PRECAUTIONS:  - Use if there is no history of liver disease, alcoholism, or intake of  three or more alcohol drinks per day - Only if approved by provider during  pregnancy or when breastfeeding - Do not exceed recommended dose or  frequency. Do not take more than 3000 milligrams (mg) in 24 hours. Do not  take this medicine for more than 10 days unless recommended by your  provider. - During pregnancy, acetaminophen should not be taken more than 3  consecutive days without telling provider - To make sure you don't take too  much, check other medicines you take to see if they also contain  acetaminophen.

## 2017-05-30 NOTE — TELEPHONE ENCOUNTER
Pt was seen in clinic today and PCP already refilled her metformin    Aaliyah Mckeon, RN, BSN

## 2017-06-21 ENCOUNTER — OFFICE VISIT (OUTPATIENT)
Dept: SURGERY | Facility: CLINIC | Age: 42
End: 2017-06-21

## 2017-06-21 VITALS
SYSTOLIC BLOOD PRESSURE: 122 MMHG | WEIGHT: 261.8 LBS | DIASTOLIC BLOOD PRESSURE: 78 MMHG | HEIGHT: 66 IN | OXYGEN SATURATION: 96 % | HEART RATE: 80 BPM | BODY MASS INDEX: 42.07 KG/M2 | TEMPERATURE: 99.2 F

## 2017-06-21 DIAGNOSIS — K60.50 ANORECTAL FISTULA: Primary | ICD-10-CM

## 2017-06-21 ASSESSMENT — PAIN SCALES - GENERAL: PAINLEVEL: NO PAIN (0)

## 2017-06-21 NOTE — MR AVS SNAPSHOT
After Visit Summary   6/21/2017    Leandra Chris    MRN: [id number]           Patient Information     Date Of Birth          1975        Visit Information        Provider Department      6/21/2017 4:45 PM Maday Zepeda APRN [facility] Colon and Rectal Surgery        Today's Diagnoses     Anorectal fistula    -  1       Follow-ups after your visit        Your next 10 appointments already scheduled     Jun 25, 2017  4:00 PM CDT   (Arrive by 3:45 PM)   MR PELVIS W/O & W CONTRAST with [id number]   [facility] ([facility])    [address] [address] [phone]           Take your medicines as usual, unless your doctor tells you not to. Bring a list of your current medicines to your exam (including vitamins, minerals and over-the-counter drugs).  You will be given intravenous contrast for this exam. To prepare:   The day before your exam, drink extra fluids at least six 8-ounce glasses (unless your doctor tells you to restrict your fluids).   Have a blood test (creatinine test) within 30 days of your exam. Go to your clinic or Diagnostic Imaging Department for this test.  The MRI machine uses a strong magnet. Please wear clothes without metal (snaps, zippers). A sweatsuit works well, or we may give you a hospital gown.  Please remove any body piercings and hair extensions before you arrive. You will also remove watches, jewelry, hairpins, wallets, dentures, partial dental plates and hearing aids. You may wear contact lenses, and you may be able to wear your rings. We have a safe place to keep your personal items, but it is safer to leave them at home.   **IMPORTANT** THE INSTRUCTIONS BELOW ARE ONLY FOR THOSE PATIENTS WHO HAVE BEEN TOLD THEY WILL RECEIVE SEDATION OR GENERAL ANESTHESIA DURING THEIR MRI PROCEDURE:  IF YOU WILL RECEIVE SEDATION (take medicine to help you relax during your exam):   You must get  the medicine from your doctor before you arrive. Bring the medicine to the exam. Do not take it at home.   Arrive one hour early. Bring someone who can take you home after the test. Your medicine will make you sleepy. After the exam, you may not drive, take a bus or take a taxi by yourself.   No eating 8 hours before your exam. You may have clear liquids up until 4 hours before your exam. (Clear liquids include water, clear tea, black coffee and fruit juice without pulp.)  IF YOU WILL RECEIVE ANESTHESIA (be asleep for your exam):   Arrive 1 1/2 hours early. Bring someone who can take you home after the test. You may not drive, take a bus or take a taxi by yourself.   No eating 8 hours before your exam. You may have clear liquids up until 4 hours before your exam. (Clear liquids include water, clear tea, black coffee and fruit juice without pulp.)  Please call the Imaging Department at your exam site with any questions.            Jul 05, 2017   Procedure with Ramone Pandey MD   [facility], [place], Same Day Surgery (--)    [address]   [place] [address] [phone]              Future tests that were ordered for you today     Open Future Orders        Priority Expected Expires Ordered    MR Pelvis w/o & w Contrast Routine  6/22/2018 6/21/2017            Who to contact     Please call your clinic at [phone] to:    Ask questions about your health    Make or cancel appointments    Discuss your medicines    Learn about your test results    Speak to your doctor   If you have compliments or concerns about an experience at your clinic, or if you wish to file a complaint, please contact [facility] Patient Relations at [phone] or email us at [email or web address]         Additional Information About Your Visit        [employer] Information     [employer] gives you secure access to your electronic health record. If you see a primary care provider, you can also send messages to your  "care team and make appointments. If you have questions, please call your primary care clinic.  If you do not have a primary care provider, please call [phone] and they will assist you.      [employer] is an electronic gateway that provides easy, online access to your medical records. With [employer], you can request a clinic appointment, read your test results, renew a prescription or communicate with your care team.     To access your existing account, please contact your [facility] or call [phone] for assistance.        Care EveryWhere ID     This is your Care EveryWhere ID. This could be used by other organizations to access your [facility] medical records  [id number]        Your Vitals Were     Pulse Temperature Height Pulse Oximetry BMI (Body Mass Index)       80 99.2  F (37.3  C) (Oral) 5' 6\" 96% 42.26 kg/m2        Blood Pressure from Last 3 Encounters:   06/21/17 122/78   05/30/17 106/70   04/10/17 128/79    Weight from Last 3 Encounters:   06/21/17 261 lb 12.8 oz   05/30/17 262 lb   04/10/17 263 lb 6.4 oz               Primary Care Provider Office Phone # Fax #    Nelli ISAC Nye [phone]       [facility] [address] [facility] [address]        Equal Access to Services     MARQUIS VELASCO : Hadii aad ku hadasho Soomaali, waaxda luqadaha, qaybta kaalmada adeegyada, waxay jasper diamond. So [facility] [phone].    ATENCIÓN: Si habla español, tiene a blackwood disposición servicios gratuitos de asistencia lingüística. Dilshad al [phone].    We comply with applicable federal civil rights laws and [place] laws. We do not discriminate on the basis of race, color, national origin, age, disability sex, sexual orientation or gender identity.            Thank you!     Thank you for choosing [facility] COLON AND RECTAL SURGERY  for your care. Our goal is always to provide you with excellent care. Hearing back from our patients is " one way we can continue to improve our services. Please take a few minutes to complete the written survey that you may receive in the mail after your visit with us. Thank you!             Your Updated Medication List - Protect others around you: Learn how to safely use, store and throw away your medicines at www.disposemymeds.org.          This list is accurate as of: 6/21/17  4:54 PM.  Always use your most recent med list.                   Brand Name Dispense Instructions for use Diagnosis    albuterol 108 (90 BASE) MCG/ACT Inhaler    PROAIR HFA/PROVENTIL HFA/VENTOLIN HFA    1 Inhaler    Inhale 2 puffs into the lungs every 6 hours as needed for shortness of breath / dyspnea or wheezing    Community acquired pneumonia       ALPRAZolam 0.5 MG tablet    XANAX    10 tablet    Take 1-2 tablets 45 minutes prior to flight    Other isolated or specific phobias       butalbital-aspirin-caffeine -40 MG per capsule    FIORINAL    90 capsule    Take 1 capsule by mouth every 4 hours as needed for pain    Migraine without status migrainosus, not intractable, unspecified migraine type       cholecalciferol 62271 UNITS capsule    VITAMIN D3    12 capsule    Take 1 capsule (50,000 Units) by mouth once a week for 12 doses    Vitamin D deficiency       IBUPROFEN PO      Reported on 4/4/2017        metFORMIN 500 MG 24 hr tablet    GLUCOPHAGE-XR    180 tablet    Take 2 tablets (1,000 mg) by mouth daily (with breakfast)    PCOS (polycystic ovarian syndrome)       order for DME      Reported on 4/4/2017        TYLENOL PO      Take by mouth every 4 hours as needed for mild pain or fever Reported on 4/4/2017

## 2017-06-21 NOTE — PROGRESS NOTES
Colon and Rectal Surgery Follow Up Clinic Note     Referring provider:  Nelli Nye NP  [facility]  [address] [address]     RE: Leandra Chris  : 1975  CIRILO: 2017    Leandra Chris is a very pleasant 42 year old female without a significant past medical history who was seen in clinic in April of this year with rectal drainage and suspected anal fistula. She presents today for follow up.    Ms. Chris has been asymptomatic for the past few months. Her symptoms initially got much worse with increased drainage and pain after seeing me in April but have since resolved. She has not noted any further drainage or bleeding and denies any pain. She denies any difficulty with bowel movements. 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 She presents today for follow up and to see if she still needs surgery.    She has a family history of her great uncle with stomach cancer. She denies any family history of inflammatory bowel disease. She has never had a colonoscopy.    Assessment/Plan: 42 year old female with suspected anal fistula. The external opening is no longer present and she is no longer symptomatic. There is a small ulceration in the anterior midline anal verge without purulent drainage. This could represent a fistula opening or possibly just an anal fissure. I recommended a 3T MRI to further evaluate. If this does not show a fistula tract currently, would hold off on surgery and continue to monitor the ulceration if she has any further symptoms. If this does show a fistula tract I would recommend proceeding with planned EUA with possible fistulotomy and possible seton drain placement. However, she is currently asymptomatic and may decide to wait. However, I did advise her that is she does wait that she could develop a recurrent abscess or recurrent symptoms requiring intervention at a time that may be inconvenient for her. Will  reevaluate plan following 3T MRI.  Patient's questions were answered to her stated satisfaction and she is in agreement with this plan.    Medical history:  Past Medical History:   Diagnosis Date     Depressive disorder      LSIL (low grade squamous intraepithelial lesion) on Pap smear 2008    colp neg     Migraines      PCOS (polycystic ovarian syndrome)      Pneumonia        Surgical history:  Past Surgical History:   Procedure Laterality Date     NO HISTORY OF SURGERY         Problem list:    Patient Active Problem List    Diagnosis Date Noted     BMI of 40.0-44.9, adult (H) 04/04/2017     Priority: Medium     Migraine headache 10/21/2011     Priority: Medium     Obesity 10/21/2011     Priority: Medium     CARDIOVASCULAR SCREENING; LDL GOAL LESS THAN 160 10/31/2010     Priority: Medium     Insomnia 07/08/2010     Priority: Medium     Anxiety 04/09/2009     Priority: Medium     Vitamin D deficiency 04/09/2009     Priority: Medium     PCOS (polycystic ovarian syndrome) 02/20/2009     Priority: Medium       Medications:  Current Outpatient Prescriptions   Medication Sig Dispense Refill     metFORMIN (GLUCOPHAGE-XR) 500 MG 24 hr tablet Take 2 tablets (1,000 mg) by mouth daily (with breakfast) 180 tablet PRN     cholecalciferol (VITAMIN D3) 30057 UNITS capsule Take 1 capsule (50,000 Units) by mouth once a week for 12 doses 12 capsule 0     butalbital-aspirin-caffeine (FIORINAL) -40 MG per capsule Take 1 capsule by mouth every 4 hours as needed for pain 90 capsule 1     ALPRAZolam (XANAX) 0.5 MG tablet Take 1-2 tablets 45 minutes prior to flight 10 tablet 0     IBUPROFEN PO Reported on 4/4/2017       albuterol (PROAIR HFA, PROVENTIL HFA, VENTOLIN HFA) 108 (90 BASE) MCG/ACT inhaler Inhale 2 puffs into the lungs every 6 hours as needed for shortness of breath / dyspnea or wheezing 1 Inhaler 1     Acetaminophen (TYLENOL PO) Take by mouth every 4 hours as needed for mild pain or fever Reported on 4/4/2017       ORDER  "FOR DME Reported on 4/4/2017         Allergies:  Allergies   Allergen Reactions     Penicillin [[place]]      Trazodone      Other reaction(s): Headache  Pt reports after taking trazodone she had a migraine requiring IM medication     Shellfish-Derived Products Rash       Family history:  Family History   Problem Relation Age of Onset     Prostate Cancer Father      Hypertension Maternal Grandmother      CEREBROVASCULAR DISEASE Maternal Grandmother      Substance Abuse Brother      Asthma No family hx of      C.A.D. No family hx of      DIABETES No family hx of      Breast Cancer No family hx of      Cancer - colorectal No family hx of        Social history:  Social History   Substance Use Topics     Smoking status: Former Smoker     Packs/day: 0.50     Years: 13.00     Types: Cigarettes     Start date: 1/1/1999     Quit date: 2/1/2012     Smokeless tobacco: Never Used     Alcohol use Yes      Comment: rarely    Marital status: single.    Nursing Notes:   Maria Fernanda Greer LPN  6/21/2017  4:19 PM  Signed  Chief Complaint   Patient presents with     Clinic Care Coordination - Follow-up     return       Vitals:    06/21/17 1618   BP: 122/78   Pulse: 80   Temp: 99.2  F (37.3  C)   TempSrc: Oral   SpO2: 96%   Weight: 261 lb 12.8 oz   Height: 5' 6\"       Body mass index is 42.26 kg/(m^2).    Maria Fernanda DE LUNA LPN                     Physical Examination:  /78  Pulse 80  Temp 99.2  F (37.3  C) (Oral)  Ht 5' 6\"  Wt 261 lb 12.8 oz  SpO2 96%  BMI 42.26 kg/m2  General: alert, oriented, in no acute distress, sitting comfortably  HEENT: mucous membranes moist  Perianal external examination:  Perianal skin: Intact with no excoriation or lichenification.  Lesions: None  Skin tags or external hemorrhoids: Yes: small anterior midline anal skin tag.  Digital rectal examination: Was performed.   Sphincter tone: Good.  Palpable lesions: No.  Other: None.  Bimanual examination: was not performed.    Anoscopy: Was performed. "   Hemorrhoids: No significant internal hemorrhoids.  Lesions: Small ulceration in the anterior midline without bleeding or drainage    Total face to face time was 15 minutes, >50% counseling.    EDISON Shaikh, NP-C  Colon and Rectal Surgery   [facility]    This note was created using speech recognition software and may contain unintended word substitutions.

## 2017-06-21 NOTE — NURSING NOTE
"Chief Complaint   Patient presents with     Clinic Care Coordination - Follow-up     return       Vitals:    06/21/17 1618   BP: 122/78   Pulse: 80   Temp: 99.2  F (37.3  C)   TempSrc: Oral   SpO2: 96%   Weight: 261 lb 12.8 oz   Height: 5' 6\"       Body mass index is 42.26 kg/(m^2).    Maria Fernanda DE LUNA LPN                  "

## 2017-06-25 NOTE — DISCHARGE INSTRUCTIONS
Anorectal Surgery Instructions    What can I expect after anorectal surgery?  Most anorectal procedures are done as outpatient surgery, and you go home the same day as the procedure. A few surgical procedures will require that you stay in the hospital for about one to three days. No matter where the procedure is done or how long or short it takes, these recommendations will help you heal and feel more comfortable.    Medicines:  The anal area is very sensitive; you can expect to have some pain for up to 2-4 weeks after the procedure. Your doctor will give you a prescription for one or more pain medications.    Take naprosyn 500 mg twice a day OR ibuprofen 600 mg four times a day     Take this on a regular basis (not as needed) following your surgery.     The drugs are best taken with food.  Do not take if it causes stomach upset or if you have a history of ulcers or gastritis. You can stop the naprosyn (or ibuprofen) or reduce the dose when you are feeling better.    DO NOT use naprosyn, ibuprofen, or other similar agents (eg. Advil or Aleve) if you have inflammatory bowel disease (Ulcerative Colitis or Crohn's disease) or if your doctor as advised you against using these medications    Take acetominaphen (Tylenol) 650-1000 mg four times a day.     Take this on a regular basis (not as needed) following surgery for pain control.     Take the lower dose if you are >65 years old or have liver disease. The maximum dose of acetominaphen is 4000 mg a day. You can stop the acetaminophen or reduce the dose when you are feeling better.    It is important to realize that many narcotic pain relievers (including vicodin, percocet, tylenol #3) also have acetaminophen, and excessive doses of acetaminophen can be dangerous, so do not take these in addition to acetominaphen.  You may take narcotics that don't contain acetominaphen such as oxycodone.      Take oxycodone AS NEEDED in addition to the acetominaphen and naprosyn.       Because narcotics have side effects (including constipation), you should reduce your use of these medications as tolerated as your pain improves.    *In general, the best strategy is to take (if you are able to tolerate it) the tylenol and naprosen on a regular basis until your pain has largely gone away. You can take the narcotic pain medicine as needed in addition to the tylenol and ibuprofen. As your pain begins to lessen, you should cut back on your narcotic use while continuing to take your regular tylenol and naprosyn doses.      Refilling prescriptions. If you need additional pain medication, please call the triage nurse at [phone] during normal business hours (8 a.m. to 4 p.m., Monday though Friday) or have your pharmacy fax a refill request to [phone]. If you call after hours or on the weekends, the doctor on call may not know you personally and may not renew narcotic pain medication by phone. Call your primary care provider for all other medication refills.    Perineal care:  Tub baths:    If possible, take a tub bath immediately after each bowel movement.     Baths should be take at least 3 times daily for the first week to 10 days following your procedure. You should soak in the tub for 10 to 15 minutes each time with water as warm as you can tolerate.     Even after you go back to work, it is a good idea to sit in the tub in the morning, after returning from work, and again in the evening before bedtime.    Bleeding/Infection:    You can expect to have some bleeding after bowel movements, but it should stop soon after you wipe.     Use a wet cloth or perianal pad (Tucks or Preparation H pads) to gently wipe the area after each bowel movement.    Do not rub the anal area or use a lot of pressure.    Using a spray bottle filled with warm water helps loosen any remaining stool. Blot gently with a soft dry cloth or tissue paper.    Infection around the anal opening is not very common. The anal  area has excellent blood supply, which helps the area to heal. Bloody discharge after bowel movements is normal and may last 2 to 4 weeks after your surgery. However, if you bleed between bowel movements and cannot get it to stop, call the triage nurse immediately [phone].    Bowel function:  Take a fiber supplement such as Metamucil, which is over the counter. It is important to drink six to eight glasses of water or juice everyday when using fiber products.    If you do not have a bowel movement after 1-2 days:    Take Milk of Magnesia-2 tablespoons.       If there are no results, repeat this or add over the counter Miralax.      If you still do not have results, contact the clinic.     If there are no results, repeat this. Stop taking Milk of Magnesia or other laxatives if you begin to have diarrhea.    * Constipation will cause you to strain when you have a bowel movement. The hard stool will be difficult to pass, will increase pain and bleeding, and will slow down healing.  Try to avoid constipation and/or diarrhea as this can make the pain and bleeding worse.    * It is important to have regular bowel movements at least every other day and to keep your stool soft.  A high fiber diet, including at least four servings of fruits or vegetables daily, will help to keep your bowel movements regular and soft.    Activity:  After your procedure, there are no restrictions on your activity     except restrictions surrounding being on narcotics and in pain, such as no heavy machine operating or driving.     You may walk, climb stairs, ride in a car, and sit as tolerated.     It is helpful to avoid sitting in one position for long periods (2 or more hours).    After some surgeries, you may be told not to perform any lifting (more than 10 pounds) for several weeks after surgery.    When to call:  When do I need to call the doctor or triage nurse?    If you experience any of the problems listed here, call our triage  nurse during business hours ([phone]).     The nurse will help you with your problem or have the doctor call you.     After hours and on weekends, please call the main hospital number ([phone]) and ask for the colon and rectal surgery person on call.     Some is available to help you 24 hours a day, seven days a week.    Call for:   ? Fever greater than 101 degrees   ? Chills   ? Foul-smelling drainage   ? Nausea and vomiting   ? Diarrhea - greater than 3 water stools in 24 hours   ? Constipation - no bowel movement after 3 days   ? Severe bleeding that does not stop soon after a bowel movement   ? Problems with the incision, including increased pain, swelling, or redness

## 2017-06-26 ENCOUNTER — TELEPHONE (OUTPATIENT)
Dept: SURGERY | Facility: CLINIC | Age: 42
End: 2017-06-26

## 2017-06-26 NOTE — TELEPHONE ENCOUNTER
MRI showing no evidence of fistula or abscess. Leandra is currently asymptomatic. Discussed cancelling surgery and returning to clinic if she develops any further symptoms and she is in agreement with this.  Encouraged the patient to contact the clinic in the meantime with any questions or concerns.    EDISON Shaikh, NP-C  Colon and Rectal Surgery  [facility]

## 2017-06-26 NOTE — TELEPHONE ENCOUNTER
Per task, OR procedure cancelled.  Called and confirmed cancellation with patient.  Patient has our direct contact information for any additional questions or concerns.

## 2017-07-10 ENCOUNTER — MYC MEDICAL ADVICE (OUTPATIENT)
Dept: SLEEP MEDICINE | Facility: CLINIC | Age: 42
End: 2017-07-10

## 2017-07-11 NOTE — TELEPHONE ENCOUNTER
Message sent to pt informing her that a new card has been mailed out to her today.    JAYLEEN Kay

## 2017-07-11 NOTE — TELEPHONE ENCOUNTER
From: Leandra Chris  To: Gita Pugh MD  Sent: 7/10/2017 10:10 PM CDT  Subject: Question about an upcoming visit    Hello! I recently traveled with my CPAP machine and the little disk that records my info is missing. I tried ordering a new one but they said I must speak with a respiratory therapist who was supposed to call me. No one did. I am supposed to have an appt. with you this summer. How do I get a new disk and also should I wait to make an appt until after I have a new disk?

## 2017-08-23 ENCOUNTER — OFFICE VISIT (OUTPATIENT)
Dept: SLEEP MEDICINE | Facility: CLINIC | Age: 42
End: 2017-08-23
Attending: INTERNAL MEDICINE
Payer: COMMERCIAL

## 2017-08-23 VITALS
HEART RATE: 70 BPM | OXYGEN SATURATION: 95 % | HEIGHT: 66 IN | SYSTOLIC BLOOD PRESSURE: 123 MMHG | DIASTOLIC BLOOD PRESSURE: 61 MMHG | BODY MASS INDEX: 42.75 KG/M2 | WEIGHT: 266 LBS | RESPIRATION RATE: 16 BRPM

## 2017-08-23 DIAGNOSIS — G47.33 OSA ON CPAP: Primary | ICD-10-CM

## 2017-08-23 PROCEDURE — 99211 OFF/OP EST MAY X REQ PHY/QHP: CPT | Mod: ZF

## 2017-08-23 NOTE — MR AVS SNAPSHOT
"              After Visit Summary   8/23/2017    Leandra Chris    MRN: [id number]           Patient Information     Date Of Birth          1975        Visit Information        Provider Department      8/23/2017 9:00 AM Gita Pugh MD [facility]        Today's Diagnoses     SERGE on CPAP    -  1       Follow-ups after your visit        Follow-up notes from your care team     Return in about 1 year (around 8/23/2018).      Who to contact     If you have questions or need follow up information about today's clinic visit or your schedule please contact [facility] directly at [phone].  Normal or non-critical lab and imaging results will be communicated to you by [employer], letter or phone within 4 business days after the clinic has received the results. If you do not hear from us within 7 days, please contact the clinic through [employer] or phone. If you have a critical or abnormal lab result, we will notify you by phone as soon as possible.  Submit refill requests through [employer] or call your pharmacy and they will forward the refill request to us. Please allow 3 business days for your refill to be completed.          Additional Information About Your Visit        MyChart Information     [employer] gives you secure access to your electronic health record. If you see a primary care provider, you can also send messages to your care team and make appointments. If you have questions, please call your primary care clinic.  If you do not have a primary care provider, please call [phone] and they will assist you.        Care EveryWhere ID     This is your Care EveryWhere ID. This could be used by other organizations to access your [facility] medical records  [id number]        Your Vitals Were     Pulse Respirations Height Pulse Oximetry BMI (Body Mass Index)       70 16 1.676 m (5' 6\") 95% 42.93 kg/m2        Blood Pressure from Last 3 Encounters:   08/23/17 " 123/61   06/21/17 122/78   05/30/17 106/70    Weight from Last 3 Encounters:   08/23/17 120.7 kg (266 lb)   06/21/17 118.8 kg (261 lb 12.8 oz)   05/30/17 118.8 kg (262 lb)              We Performed the Following     Comprehensive DME        Primary Care Provider Office Phone # Fax #    Nelli Nye, [phone] [address] [facility] [id number]        Equal Access to Services     GERTRUDE VELASCO : Hadii aad ku hadasho Soomaali, waaxda luqadaha, qaybta kaalmada adeegyada, waxay idiin hayaan angelica lala . [facility] [phone].    ATENCIÓN: Si habla español, tiene a blackwood disposición servicios gratuitos de asistencia lingüística. [facility] [phone].    We comply with applicable federal civil rights laws and [place] laws. We do not discriminate on the basis of race, color, national origin, age, disability sex, sexual orientation or gender identity.            Thank you!     Thank you for choosing [facility] [place]  for your care. Our goal is always to provide you with excellent care. Hearing back from our patients is one way we can continue to improve our services. Please take a few minutes to complete the written survey that you may receive in the mail after your visit with us. Thank you!             Your Updated Medication List - Protect others around you: Learn how to safely use, store and throw away your medicines at www.disposemymeds.org.          This list is accurate as of: 8/23/17  9:36 AM.  Always use your most recent med list.                   Brand Name Dispense Instructions for use Diagnosis    albuterol 108 (90 BASE) MCG/ACT Inhaler    PROAIR HFA/PROVENTIL HFA/VENTOLIN HFA    1 Inhaler    Inhale 2 puffs into the lungs every 6 hours as needed for shortness of breath / dyspnea or wheezing    Community acquired pneumonia       ALPRAZolam 0.5 MG tablet    XANAX    10 tablet    Take 1-2 tablets 45 minutes prior to flight    Other isolated or specific phobias        butalbital-aspirin-caffeine -40 MG per capsule    FIORINAL    90 capsule    Take 1 capsule by mouth every 4 hours as needed for pain    Migraine without status migrainosus, not intractable, unspecified migraine type       IBUPROFEN PO      Reported on 4/4/2017        metFORMIN 500 MG 24 hr tablet    GLUCOPHAGE-XR    180 tablet    Take 2 tablets (1,000 mg) by mouth daily (with breakfast)    PCOS (polycystic ovarian syndrome)       order for DME      Reported on 4/4/2017        TYLENOL PO      Take by mouth every 4 hours as needed for mild pain or fever Reported on 4/4/2017

## 2017-08-23 NOTE — PROGRESS NOTES
Sleep medicine follow-up visit note     Date on  visit: 8/23/2017     Purpose of visit: Follow-up of SERGE    History of present illness: Ms. Leandra Chris is a 42 yr old  female w/ PMH significant for SERGE, PCOS, and depression who is here for follow up of her SERGE. She was last seen in 8/2016. She underwent PSG in Jan 2011,  when she was noted to have moderate SERGE with AHI=21 per hr, REM AHI = 37 per hr.      She reports using her CPAP device  regularly during sleep. There have not been any reports of snoring while she is using her CPAP. She denies awakenings due to gasping for air or choking with the device. She reports good sleep quality with the use of the device.   Lately, her 3-year-old daughter has been waking up in the middle of the night and Leandra has been  waking up and sleeping with her to comfort her, not getting enough sleep  and her ESS is score is 12 out of 24.  However, she denies fatigue and EDS, if she gets enough sleep time at night. She denies concerns about drowsiness while driving.   She reports weight gain since January 2017 ever since she quit smoking. She has started exercising, has been going to the [employer].  Downloadable compliance data from 7/24/ 2017 through 8/22/2017 reveals that she has used a device for 28 out of 30 days with an average daily use   of 7 hours and 23 minutes. Median pressure 8.5, 95th percentile 9.8 and Max pressure 10.5 cm of water; 95th percentile air leak was 1.6 L per minute. Residual AHI was 1.4 per hour.     Current meds:  Current Outpatient Prescriptions   Medication Sig Dispense Refill     metFORMIN (GLUCOPHAGE-XR) 500 MG 24 hr tablet Take 2 tablets (1,000 mg) by mouth daily (with breakfast) 180 tablet PRN     butalbital-aspirin-caffeine (FIORINAL) -40 MG per capsule Take 1 capsule by mouth every 4 hours as needed for pain 90 capsule 1     ALPRAZolam (XANAX) 0.5 MG tablet Take 1-2 tablets 45 minutes prior to flight 10 tablet 0     IBUPROFEN PO Reported on  "4/4/2017       albuterol (PROAIR HFA, PROVENTIL HFA, VENTOLIN HFA) 108 (90 BASE) MCG/ACT inhaler Inhale 2 puffs into the lungs every 6 hours as needed for shortness of breath / dyspnea or wheezing 1 Inhaler 1     Acetaminophen (TYLENOL PO) Take by mouth every 4 hours as needed for mild pain or fever Reported on 4/4/2017       ORDER FOR DME Reported on 4/4/2017       Past medical history:  Past Medical History:   Diagnosis Date     Depressive disorder      LSIL (low grade squamous intraepithelial lesion) on Pap smear 2008    colp neg     Migraines      PCOS (polycystic ovarian syndrome)      Pneumonia      Patient Active Problem List   Diagnosis     PCOS (polycystic ovarian syndrome)     Anxiety     Vitamin D deficiency     Insomnia     CARDIOVASCULAR SCREENING; LDL GOAL LESS THAN 160     Migraine headache     Obesity     BMI of 40.0-44.9, adult (H)     Past surgical history:  Past Surgical History:   Procedure Laterality Date     NO HISTORY OF SURGERY       Allergies:  Allergies   Allergen Reactions     Penicillin [Esters]      Trazodone      Other reaction(s): Headache  Pt reports after taking trazodone she had a migraine requiring IM medication     Shellfish-Derived Products Rash     Social history:  Social History   Substance Use Topics     Smoking status: Former Smoker     Packs/day: 0.50     Years: 13.00     Types: Cigarettes     Start date: 1/1/1999     Quit date: 2/1/2012     Smokeless tobacco: Never Used     Alcohol use Yes      Comment: rarely     Family history:   Family History   Problem Relation Age of Onset     Prostate Cancer Father      Hypertension Maternal Grandmother      CEREBROVASCULAR DISEASE Maternal Grandmother      Substance Abuse Brother      Asthma No family hx of      C.A.D. No family hx of      DIABETES No family hx of      Breast Cancer No family hx of      Cancer - colorectal No family hx of      Physical exam:  /61  Pulse 70  Resp 16  Ht 1.676 m (5' 6\")  Wt 120.7 kg (266 lb) " " SpO2 95%  BMI 42.93 kg/m2  General appearance:  in no apparent distress  Pt is dressed casually, cooperative with good eye contact.   Speech is spontaneous with regular rate and volume.   Mood: euthymic; affect congruent with full range and intensity.   Sensorium: awake, alert and oriented to person, place, time, and situation.    Assessment/Plan:   Previously diagnosed moderate obstructive sleep apnea, pronounced during REM sleep: Patient reports adequate compliance with the CPAP device and based on the compliance measures, SERGE appears to be adequately controlled with CPAP at the current pressure settings. Recommended her  to continue using the CPAP regularly during sleep and instructed her to get the supplies for the devices regularly replaced. Prescription for renewal of all the CPAP supplies was provided during today's visit.    She was recommended to  avoid  sleep deprivation  and aim at obtaining 7 to 8 hours of sleep per night. She was instructed not to drive if  drowsy or sleepy to prevent accidents.   We discussed weight management.  She will follow up at the sleep clinic in one year or sooner if there any concerns.    Chart documentation done in part with Dragon Voice recognition Software. Although reviewed after completion, some word and grammatical error may remain.    \"I spent a total of 25 minutes face to face with Leandra Chris during today's office  visit. Over 50% of this time was spent counseling the patient and  coordinating care regarding sleep apnea and avoiding sleep deprivation.\"       Gita Pugh MD   of Medicine,  Division of Pulmonary/Sleep Medicine  [facility].    "

## 2017-08-24 DIAGNOSIS — G47.33 OSA ON CPAP: Primary | ICD-10-CM

## 2017-11-24 ENCOUNTER — TELEPHONE (OUTPATIENT)
Dept: SLEEP MEDICINE | Facility: CLINIC | Age: 42
End: 2017-11-24

## 2017-11-24 NOTE — TELEPHONE ENCOUNTER
Patient called [facility] on 11/22/17 to get new sleep machine, and her doctor should've sent over a Rx. I reviewed patients records and is eligible. Left voicemail for patient on 11/22/17 to schedule replacement setup. Left voicemail for patient again today in regards to replacement machine. Patient will need an updated Rx for replacement machine, if she would like to proceed.

## 2017-12-01 ENCOUNTER — TELEPHONE (OUTPATIENT)
Dept: SLEEP MEDICINE | Facility: CLINIC | Age: 42
End: 2017-12-01

## 2017-12-01 NOTE — TELEPHONE ENCOUNTER
Left patient voicemail to follow up with replacement pap setup. Informed patient that she would need an updated prescription if she would like to proceed with getting a replacement. Informed patient to call me directly if she has questions or concerns.

## 2017-12-07 ENCOUNTER — MYC MEDICAL ADVICE (OUTPATIENT)
Dept: SLEEP MEDICINE | Facility: CLINIC | Age: 42
End: 2017-12-07

## 2017-12-14 DIAGNOSIS — G47.33 OSA ON CPAP: Primary | ICD-10-CM

## 2017-12-14 NOTE — PROGRESS NOTES
Please sign orders for Dr. Arenas as the patient was not able at the time to get set up for her cpap and now has been told the order needs to be resent as it is over 90 days per [facility].    Thank you,  Izzy

## 2017-12-27 ENCOUNTER — DOCUMENTATION ONLY (OUTPATIENT)
Dept: SLEEP MEDICINE | Facility: CLINIC | Age: 42
End: 2017-12-27

## 2017-12-27 NOTE — PROGRESS NOTES
Patient was offered choice of vendor and chose [facility].  Patient Leandra ROSADO Aries was set up at [place] on December 27, 2017. Patient received a Resmed AirSense 10 Auto. Pressures were set at 8-14 cm H2O.   Patient s ramp is 5 cm H2O for 30 min and FLEX/EPR is EPR.  Patient received a Resmed Mask name: trevino fx [email or web address]  Pillow mask Size Small, heated tubing and heated humidifier.  Patient is not enrolled in the STM Program and does not need to meet compliance.   Keri Flores

## 2018-01-02 DIAGNOSIS — G43.909 MIGRAINE WITHOUT STATUS MIGRAINOSUS, NOT INTRACTABLE, UNSPECIFIED MIGRAINE TYPE: ICD-10-CM

## 2018-01-02 NOTE — TELEPHONE ENCOUNTER
Controlled Substance Refill Request for butalbital-aspirin-caffeine (FIORINAL) -40 MG per capsule  Problem List Complete:  No     PROVIDER TO CONSIDER COMPLETION OF PROBLEM LIST AND OVERVIEW/CONTROLLED SUBSTANCE AGREEMENT    Last Written Prescription Date:  11-30-16  Last Fill Quantity: 90 cap,   # refills: 1    Last Office Visit with [facility] primary care provider: 5-30-17    Future Office visit:     Controlled substance agreement on file: No.     Processing:  ?     checked in past 6 months?  No, route to RN

## 2018-01-03 RX ORDER — BUTALBITAL/ASPIRIN/CAFFEINE 50-325-40
1 CAPSULE ORAL EVERY 4 HOURS PRN
Qty: 90 CAPSULE | Refills: 1 | Status: SHIPPED | OUTPATIENT
Start: 2018-01-03 | End: 2019-08-22

## 2018-01-03 NOTE — TELEPHONE ENCOUNTER
Routing refill request to provider for review/approval because:  Drug not on the [facility] refill protocol     Rebecca-Please review and sign if agree.  Or would you recommend office visit to address this refill?    Thank you!  NELA SantoroN, RN

## 2018-01-10 DIAGNOSIS — E55.9 VITAMIN D DEFICIENCY: ICD-10-CM

## 2018-01-10 PROCEDURE — 82306 VITAMIN D 25 HYDROXY: CPT | Performed by: NURSE PRACTITIONER

## 2018-01-10 PROCEDURE — 36415 COLL VENOUS BLD VENIPUNCTURE: CPT | Performed by: NURSE PRACTITIONER

## 2018-01-11 LAB — DEPRECATED CALCIDIOL+CALCIFEROL SERPL-MC: 32 UG/L (ref 20–75)

## 2018-01-12 ENCOUNTER — MYC MEDICAL ADVICE (OUTPATIENT)
Dept: FAMILY MEDICINE | Facility: CLINIC | Age: 43
End: 2018-01-12

## 2018-01-25 ENCOUNTER — MYC MEDICAL ADVICE (OUTPATIENT)
Dept: FAMILY MEDICINE | Facility: CLINIC | Age: 43
End: 2018-01-25

## 2018-02-09 ENCOUNTER — E-VISIT (OUTPATIENT)
Dept: FAMILY MEDICINE | Facility: CLINIC | Age: 43
End: 2018-02-09
Payer: COMMERCIAL

## 2018-02-09 DIAGNOSIS — F40.243 FLYING PHOBIA: Primary | ICD-10-CM

## 2018-02-09 PROCEDURE — 99444 ZZC PHYSICIAN ONLINE EVALUATION & MANAGEMENT SERVICE: CPT | Performed by: NURSE PRACTITIONER

## 2018-02-09 NOTE — MR AVS SNAPSHOT
After Visit Summary   2/9/2018    Leandra Chris    MRN: [id number]           Patient Information     Date Of Birth          1975        Visit Information        Provider Department      2/9/2018 11:10 PM Nelli Nye NP [facility]        Today's Diagnoses     Flying phobia    -  1       Follow-ups after your visit        Who to contact     If you have questions or need follow up information about today's clinic visit or your schedule please contact [facility] directly at [phone].  Normal or non-critical lab and imaging results will be communicated to you by Nexalogyhart, letter or phone within 4 business days after the clinic has received the results. If you do not hear from us within 7 days, please contact the clinic through [employer] or phone. If you have a critical or abnormal lab result, we will notify you by phone as soon as possible.  Submit refill requests through [employer] or call your pharmacy and they will forward the refill request to us. Please allow 3 business days for your refill to be completed.          Additional Information About Your Visit        MyChart Information     [employer] gives you secure access to your electronic health record. If you see a primary care provider, you can also send messages to your care team and make appointments. If you have questions, please call your primary care clinic.  If you do not have a primary care provider, please call [phone] and they will assist you.        Care EveryWhere ID     This is your Care EveryWhere ID. This could be used by other organizations to access your [place] medical records  [id number]         Blood Pressure from Last 3 Encounters:   08/23/17 123/61   06/21/17 122/78   05/30/17 106/70    Weight from Last 3 Encounters:   08/23/17 266 lb (120.7 kg)   06/21/17 261 lb 12.8 oz (118.8 kg)   05/30/17 262 lb (118.8 kg)              Today, you had the following     No orders found for  display         Where to get your medicines      Some of these will need a paper prescription and others can be bought over the counter.  Ask your nurse if you have questions.     Bring a paper prescription for each of these medications     ALPRAZolam 0.5 MG tablet          Primary Care Provider Office Phone # Fax #    Nelli CHAU RITA Nye [phone] [address] [facility] [address]        Equal Access to Services     MARQUIS VELASCO : Hadii aad ku hadasho Soomaali, waaxda luqadaha, qaybta kaalmada adeegyada, waxay idiin hayaan adeeg kharash lameghann . So [facility] [phone].    ATENCIÓN: Si premala espreza, tiene a blackwood disposición servicios gratuitos de asistencia lingüística. [facility] [phone].    We comply with applicable federal civil rights laws and [place] laws. We do not discriminate on the basis of race, color, national origin, age, disability, sex, sexual orientation, or gender identity.            Thank you!     Thank you for choosing [facility]  for your care. Our goal is always to provide you with excellent care. Hearing back from our patients is one way we can continue to improve our services. Please take a few minutes to complete the written survey that you may receive in the mail after your visit with us. Thank you!             Your Updated Medication List - Protect others around you: Learn how to safely use, store and throw away your medicines at www.disposemymeds.org.          This list is accurate as of 2/9/18 11:59 PM.  Always use your most recent med list.                   Brand Name Dispense Instructions for use Diagnosis    albuterol 108 (90 BASE) MCG/ACT Inhaler    PROAIR HFA/PROVENTIL HFA/VENTOLIN HFA    1 Inhaler    Inhale 2 puffs into the lungs every 6 hours as needed for shortness of breath / dyspnea or wheezing    Community acquired pneumonia       ALPRAZolam 0.5 MG tablet    XANAX    10 tablet    Take 1-2 tablets 45 minutes prior to flight     Flying phobia       butalbital-aspirin-caffeine -40 MG per capsule    FIORINAL    90 capsule    Take 1 capsule by mouth every 4 hours as needed for pain    Migraine without status migrainosus, not intractable, unspecified migraine type       IBUPROFEN PO      Reported on 4/4/2017        metFORMIN 500 MG 24 hr tablet    GLUCOPHAGE-XR    180 tablet    Take 2 tablets (1,000 mg) by mouth daily (with breakfast)    PCOS (polycystic ovarian syndrome)       order for DME      Reported on 4/4/2017        TYLENOL PO      Take by mouth every 4 hours as needed for mild pain or fever Reported on 4/4/2017

## 2018-02-12 RX ORDER — ALPRAZOLAM 0.5 MG
TABLET ORAL
Qty: 10 TABLET | Refills: 0 | Status: SHIPPED | OUTPATIENT
Start: 2018-02-12 | End: 2019-07-24

## 2018-02-12 NOTE — TELEPHONE ENCOUNTER
I faxed Rx for Xanax 0.5 MG tablet to [facility]  Start date: 02/12/2018  Fax #: [phone]    Anastasiya Cabrera MA

## 2018-06-16 DIAGNOSIS — E28.2 PCOS (POLYCYSTIC OVARIAN SYNDROME): ICD-10-CM

## 2018-06-17 NOTE — TELEPHONE ENCOUNTER
"Requested Prescriptions   Pending Prescriptions Disp Refills     metFORMIN (GLUCOPHAGE-XR) 500 MG 24 hr tablet [Pharmacy Med Name: METFORMIN ER 500MG 24HR TABS]      Last Written Prescription Date:  5/30/2017  Last Fill Quantity: 180 tablets    ,  # refills: PRN   Last Office Visit: 5/30/2017   Future Office Visit:       0     Sig: TAKE 2 TABLETS BY MOUTH DAILY(WITH BREAKFAST)    Biguanide Agents Failed    6/16/2018 11:03 AM       Failed - Blood pressure less than 140/90 in past 6 months    BP Readings from Last 3 Encounters:   08/23/17 123/61   06/21/17 122/78   05/30/17 106/70          Failed - Recent (12 mo) or future (30 days) visit within the authorizing provider's specialty     Patient had office visit in the last 12 months or has a visit in the next 30 days with authorizing provider or within the authorizing provider's specialty.  See \"Patient Info\" tab in inbasket, or \"Choose Columns\" in Meds & Orders section of the refill encounter.           Passed - Patient is age 10 or older       Passed - Patient does NOT have a diagnosis of CHF.       Passed - Patient is not pregnant       Passed - Patient has not had a positive pregnancy test within the past 12 mos.           "

## 2018-06-20 RX ORDER — METFORMIN HCL 500 MG
TABLET, EXTENDED RELEASE 24 HR ORAL
Qty: 60 TABLET | Refills: 0 | Status: SHIPPED | OUTPATIENT
Start: 2018-06-20 | End: 2018-08-09

## 2018-07-11 ENCOUNTER — TELEPHONE (OUTPATIENT)
Dept: SLEEP MEDICINE | Facility: CLINIC | Age: 43
End: 2018-07-11

## 2018-07-11 NOTE — TELEPHONE ENCOUNTER
Pt sent my chart appt request and was called back to schedule.  Message left asking pt to call back to schedule.    JAYLEEN Kay

## 2018-07-31 ENCOUNTER — MYC MEDICAL ADVICE (OUTPATIENT)
Dept: FAMILY MEDICINE | Facility: CLINIC | Age: 43
End: 2018-07-31

## 2018-07-31 DIAGNOSIS — F32.0 MILD MAJOR DEPRESSION (H): Primary | ICD-10-CM

## 2018-07-31 NOTE — TELEPHONE ENCOUNTER
Referral signed.  You can let her know that Mikaela is great, but it might take awhile to get in.

## 2018-08-06 ASSESSMENT — PATIENT HEALTH QUESTIONNAIRE - PHQ9
10. IF YOU CHECKED OFF ANY PROBLEMS, HOW DIFFICULT HAVE THESE PROBLEMS MADE IT FOR YOU TO DO YOUR WORK, TAKE CARE OF THINGS AT HOME, OR GET ALONG WITH OTHER PEOPLE: SOMEWHAT DIFFICULT
SUM OF ALL RESPONSES TO PHQ QUESTIONS 1-9: 9
SUM OF ALL RESPONSES TO PHQ QUESTIONS 1-9: 9

## 2018-08-07 ASSESSMENT — PATIENT HEALTH QUESTIONNAIRE - PHQ9: SUM OF ALL RESPONSES TO PHQ QUESTIONS 1-9: 9

## 2018-08-09 ENCOUNTER — OFFICE VISIT (OUTPATIENT)
Dept: FAMILY MEDICINE | Facility: CLINIC | Age: 43
End: 2018-08-09
Payer: COMMERCIAL

## 2018-08-09 VITALS
HEART RATE: 58 BPM | WEIGHT: 267 LBS | BODY MASS INDEX: 42.91 KG/M2 | DIASTOLIC BLOOD PRESSURE: 68 MMHG | OXYGEN SATURATION: 98 % | RESPIRATION RATE: 16 BRPM | TEMPERATURE: 99.3 F | HEIGHT: 66 IN | SYSTOLIC BLOOD PRESSURE: 126 MMHG

## 2018-08-09 DIAGNOSIS — Z00.00 ENCOUNTER FOR ROUTINE ADULT HEALTH EXAMINATION WITHOUT ABNORMAL FINDINGS: Primary | ICD-10-CM

## 2018-08-09 DIAGNOSIS — E66.01 MORBID OBESITY WITH BMI OF 40.0-44.9, ADULT (H): ICD-10-CM

## 2018-08-09 DIAGNOSIS — E28.2 PCOS (POLYCYSTIC OVARIAN SYNDROME): ICD-10-CM

## 2018-08-09 PROBLEM — F32.0 MILD MAJOR DEPRESSION (H): Status: RESOLVED | Noted: 2018-07-31 | Resolved: 2018-08-09

## 2018-08-09 LAB
ANION GAP SERPL CALCULATED.3IONS-SCNC: 10 MMOL/L (ref 3–14)
BUN SERPL-MCNC: 11 MG/DL (ref 7–30)
CALCIUM SERPL-MCNC: 8.8 MG/DL (ref 8.5–10.1)
CHLORIDE SERPL-SCNC: 105 MMOL/L (ref 94–109)
CHOLEST SERPL-MCNC: 170 MG/DL
CO2 SERPL-SCNC: 25 MMOL/L (ref 20–32)
CREAT SERPL-MCNC: 0.64 MG/DL (ref 0.52–1.04)
GFR SERPL CREATININE-BSD FRML MDRD: >90 ML/MIN/1.7M2
GLUCOSE SERPL-MCNC: 90 MG/DL (ref 70–99)
HDLC SERPL-MCNC: 56 MG/DL
LDLC SERPL CALC-MCNC: 92 MG/DL
NONHDLC SERPL-MCNC: 114 MG/DL
POTASSIUM SERPL-SCNC: 4 MMOL/L (ref 3.4–5.3)
SODIUM SERPL-SCNC: 140 MMOL/L (ref 133–144)
TRIGL SERPL-MCNC: 111 MG/DL

## 2018-08-09 PROCEDURE — G0145 SCR C/V CYTO,THINLAYER,RESCR: HCPCS | Performed by: NURSE PRACTITIONER

## 2018-08-09 PROCEDURE — 80048 BASIC METABOLIC PNL TOTAL CA: CPT | Performed by: NURSE PRACTITIONER

## 2018-08-09 PROCEDURE — 36415 COLL VENOUS BLD VENIPUNCTURE: CPT | Performed by: NURSE PRACTITIONER

## 2018-08-09 PROCEDURE — 80061 LIPID PANEL: CPT | Performed by: NURSE PRACTITIONER

## 2018-08-09 PROCEDURE — 87624 HPV HI-RISK TYP POOLED RSLT: CPT | Performed by: NURSE PRACTITIONER

## 2018-08-09 PROCEDURE — 99396 PREV VISIT EST AGE 40-64: CPT | Performed by: NURSE PRACTITIONER

## 2018-08-09 RX ORDER — METFORMIN HCL 500 MG
TABLET, EXTENDED RELEASE 24 HR ORAL
Qty: 60 TABLET | Status: SHIPPED | OUTPATIENT
Start: 2018-08-09 | End: 2019-06-10

## 2018-08-09 NOTE — PROGRESS NOTES
SUBJECTIVE:   CC: Leandra Chris is an 43 year old woman who presents for preventive health visit.     Healthy Habits:    Do you get at least three servings of calcium containing foods daily (dairy, green leafy vegetables, etc.)? yes    Amount of exercise or daily activities, outside of work: 3 day(s) per week    Problems taking medications regularly No    Medication side effects: Yes upset stomach if missing Metformin daily dose.    Have you had an eye exam in the past two years? no    Do you see a dentist twice per year? Once a year    Do you have sleep apnea, excessive snoring or daytime drowsiness?yes          Today's PHQ-2 Score:   PHQ-2 ( 1999 [employer]) 8/6/2018 7/21/2016   Q1: Little interest or pleasure in doing things 1 0   Q2: Feeling down, depressed or hopeless 2 0   PHQ-2 Score 3 0   Q1: Little interest or pleasure in doing things Several days -   Q2: Feeling down, depressed or hopeless More than half the days -   PHQ-2 Score 3 -       Abuse: Current or Past(Physical, Sexual or Emotional)- No  Do you feel safe in your environment - Yes    Social History   Substance Use Topics     Smoking status: Former Smoker     Packs/day: 0.50     Years: 13.00     Types: Cigarettes     Start date: 1/1/1999     Quit date: 2/1/2012     Smokeless tobacco: Never Used     Alcohol use Yes      Comment: rarely     If you drink alcohol do you typically have >3 drinks per day or >7 drinks per week? No                     Reviewed orders with patient.  Reviewed health maintenance and updated orders accordingly - Yes          Pertinent mammograms are reviewed under the imaging tab.  History of abnormal Pap smear: NO - age 30-65 PAP every 5 years with negative HPV co-testing recommended  PAP / HPV 6/30/2015 10/21/2011   PAP NIL NIL     Reviewed and updated as needed this visit by clinical staff  Tobacco  Meds  Med Hx  Surg Hx  Fam Hx  Soc Hx        Reviewed and updated as needed this visit by Provider       "      ROS:  CONSTITUTIONAL: NEGATIVE for fever, chills, change in weight  INTEGUMENTARU/SKIN: NEGATIVE for worrisome rashes, moles or lesions  EYES: NEGATIVE for vision changes or irritation  ENT: NEGATIVE for ear, mouth and throat problems  RESP: NEGATIVE for significant cough or SOB  BREAST: NEGATIVE for masses, tenderness or discharge  CV: NEGATIVE for chest pain, palpitations or peripheral edema  GI: NEGATIVE for nausea, abdominal pain, heartburn, or change in bowel habits  : NEGATIVE for unusual urinary or vaginal symptoms. Periods are irregular.  MUSCULOSKELETAL: NEGATIVE for significant arthralgias or myalgia  NEURO: NEGATIVE for weakness, dizziness or paresthesias  PSYCHIATRIC: NEGATIVE for changes in mood or affect    OBJECTIVE:   /68 (BP Location: Right arm, Patient Position: Sitting)  Pulse 58  Temp 99.3  F (37.4  C) (Oral)  Resp 16  Ht 5' 6\" (1.676 m)  Wt 267 lb (121.1 kg)  LMP 07/02/2018  SpO2 98%  Breastfeeding? No  BMI 43.09 kg/m2  EXAM:  GENERAL: healthy, alert and no distress  EYES: Eyes grossly normal to inspection, PERRL and conjunctivae and sclerae normal  HENT: ear canals and TM's normal, nose and mouth without ulcers or lesions  NECK: no adenopathy, no asymmetry, masses, or scars and thyroid normal to palpation  RESP: lungs clear to auscultation - no rales, rhonchi or wheezes  BREAST: normal without masses, tenderness or nipple discharge and no palpable axillary masses or adenopathy  CV: regular rate and rhythm, normal S1 S2, no S3 or S4, no murmur, click or rub, no peripheral edema and peripheral pulses strong  ABDOMEN: soft, nontender, no hepatosplenomegaly, no masses and bowel sounds normal   (female): normal female external genitalia, normal urethral meatus, vaginal mucosa pink, moist, well rugated, and normal cervix/adnexa/uterus without masses or discharge  MS: no gross musculoskeletal defects noted, no edema  SKIN: no suspicious lesions or rashes  NEURO: Normal " "strength and tone, mentation intact and speech normal  PSYCH: mentation appears normal, affect normal/bright        ASSESSMENT/PLAN:   1. Encounter for routine adult health examination without abnormal findings    - Lipid panel reflex to direct LDL Fasting  - Pap imaged thin layer screen with HPV - recommended age 30 - 65 years (select HPV order below)  - HPV High Risk Types DNA Cervical    2. PCOS (polycystic ovarian syndrome)  The current medical regimen is effective;  continue present plan and medications.   - Basic metabolic panel  - metFORMIN (GLUCOPHAGE-XR) 500 MG 24 hr tablet; TAKE 2 TABLETS BY MOUTH DAILY(WITH BREAKFAST)  Dispense: 60 tablet; Refill: PRN    3. BMI of 40.0-44.9, adult (H)  Discussed diet and exercise.       COUNSELING:   Reviewed preventive health counseling, as reflected in patient instructions    BP Readings from Last 1 Encounters:   08/09/18 126/68     Estimated body mass index is 43.09 kg/(m^2) as calculated from the following:    Height as of this encounter: 5' 6\" (1.676 m).    Weight as of this encounter: 267 lb (121.1 kg).      Weight management plan: Discussed healthy diet and exercise guidelines and patient will follow up in 12 months in clinic to re-evaluate.     reports that she quit smoking about 6 years ago. Her smoking use included Cigarettes. She started smoking about 19 years ago. She has a 6.50 pack-year smoking history. She has never used smokeless tobacco.      Counseling Resources:  ATP IV Guidelines  Pooled Cohorts Equation Calculator  Breast Cancer Risk Calculator  FRAX Risk Assessment  ICSI Preventive Guidelines  Dietary Guidelines for Americans, 2010  InDemand Interpreting's MyPlate  ASA Prophylaxis  Lung CA Screening    Nelli Nye NP  [facility]  Answers for HPI/ROS submitted by the patient on 8/6/2018   Annual Exam:  Getting at least 3 servings of Calcium per day:: Yes  Bi-annual eye exam:: NO  Dental care twice a year:: NO  Sleep apnea or symptoms of " sleep apnea:: Sleep apnea  Diet:: Regular (no restrictions)  Frequency of exercise:: 2-3 days/week  Taking medications regularly:: Yes  Medication side effects:: None  Additional concerns today:: No  PHQ-2 Score: 3  Duration of exercise:: 30-45 minutes  If you checked off any problems, how difficult have these problems made it for you to do your work, take care of things at home, or get along with other people?: Somewhat difficult  PHQ9 TOTAL SCORE: 9

## 2018-08-09 NOTE — LETTER
My Depression Action Plan  Name: Leandra Chris   Date of Birth 1975  Date: 8/9/2018    My doctor: Nelli Nye   My clinic: [address] [id number] [phone]          GREEN    ZONE   Good Control    What it looks like:     Things are going generally well. You have normal up s and down s. You may even feel depressed from time to time, but bad moods usually last less than a day.   What you need to do:  1. Continue to care for yourself (see self care plan)  2. Check your depression survival kit and update it as needed  3. Follow your physician s recommendations including any medication.  4. Do not stop taking medication unless you consult with your physician first.           YELLOW         ZONE Getting Worse    What it looks like:     Depression is starting to interfere with your life.     It may be hard to get out of bed; you may be starting to isolate yourself from others.    Symptoms of depression are starting to last most all day and this has happened for several days.     You may have suicidal thoughts but they are not constant.   What you need to do:     1. Call your care team, your response to treatment will improve if you keep your care team informed of your progress. Yellow periods are signs an adjustment may need to be made.     2. Continue your self-care, even if you have to fake it!    3. Talk to someone in your support network    4. Open up your depression survival kit           RED    ZONE Medical Alert - Get Help    What it looks like:     Depression is seriously interfering with your life.     You may experience these or other symptoms: You can t get out of bed most days, can t work or engage in other necessary activities, you have trouble taking care of basic hygiene, or basic responsibilities, thoughts of suicide or death that will not go away, self-injurious behavior.     What you need to do:  1. Call your care team and  request a same-day appointment. If they are not available (weekends or after hours) call your local crisis line, emergency room or 911.            Depression Self Care Plan / Survival Kit    Self-Care for Depression  Here s the deal. Your body and mind are really not as separate as most people think.  What you do and think affects how you feel and how you feel influences what you do and think. This means if you do things that people who feel good do, it will help you feel better.  Sometimes this is all it takes.  There is also a place for medication and therapy depending on how severe your depression is, so be sure to consult with your medical provider and/ or Behavioral Health Consultant if your symptoms are worsening or not improving.     In order to better manage my stress, I will:    Exercise  Get some form of exercise, every day. This will help reduce pain and release endorphins, the  feel good  chemicals in your brain. This is almost as good as taking antidepressants!  This is not the same as joining a gym and then never going! (they count on that by the way ) It can be as simple as just going for a walk or doing some gardening, anything that will get you moving.      Hygiene   Maintain good hygiene (Get out of bed in the morning, Make your bed, Brush your teeth, Take a shower, and Get dressed like you were going to work, even if you are unemployed).  If your clothes don't fit try to get ones that do.    Diet  I will strive to eat foods that are good for me, drink plenty of water, and avoid excessive sugar, caffeine, alcohol, and other mood-altering substances.  Some foods that are helpful in depression are: complex carbohydrates, B vitamins, flaxseed, fish or fish oil, fresh fruits and vegetables.    Psychotherapy  I agree to participate in Individual Therapy (if recommended).    Medication  If prescribed medications, I agree to take them.  Missing doses can result in serious side effects.  I understand that  drinking alcohol, or other illicit drug use, may cause potential side effects.  I will not stop my medication abruptly without first discussing it with my provider.    Staying Connected With Others  I will stay in touch with my friends, family members, and my primary care provider/team.    Use your imagination  Be creative.  We all have a creative side; it doesn t matter if it s oil painting, sand castles, or mud pies! This will also kick up the endorphins.    Witness Beauty  (AKA stop and smell the roses) Take a look outside, even in mid-winter. Notice colors, textures. Watch the squirrels and birds.     Service to others  Be of service to others.  There is always someone else in need.  By helping others we can  get out of ourselves  and remember the really important things.  This also provides opportunities for practicing all the other parts of the program.    Humor  Laugh and be silly!  Adjust your TV habits for less news and crime-drama and more comedy.    Control your stress  Try breathing deep, massage therapy, biofeedback, and meditation. Find time to relax each day.     My support system    Clinic Contact:  Phone number:    Contact 1:  Phone number:    Contact 2:  Phone number:    Worship/:  Phone number:    Therapist:  Phone number:    Local crisis center:    Phone number:    Other community support:  Phone number:

## 2018-08-09 NOTE — MR AVS SNAPSHOT
After Visit Summary   8/9/2018    Leandra Chris    MRN: [id number]           Patient Information     Date Of Birth          1975        Visit Information        Provider Department      8/9/2018 9:00 AM Nelli Nye NP [facility]        Today's Diagnoses     Encounter for routine adult health examination without abnormal findings    -  1    PCOS (polycystic ovarian syndrome)          Care Instructions      Preventive Health Recommendations  Female Ages 40 to 49    Yearly exam:     See your health care provider every year in order to  1. Review health changes.   2. Discuss preventive care.    3. Review your medicines if your doctor prescribed any.      Get a Pap test every three years (unless you have an abnormal result and your provider advises testing more often).      If you get Pap tests with HPV test, you only need to test every 5 years, unless you have an abnormal result. You do not need a Pap test if your uterus was removed (hysterectomy) and you have not had cancer.      You should be tested each year for STDs (sexually transmitted diseases), if you're at risk.     Ask your doctor if you should have a mammogram.      Have a colonoscopy (test for colon cancer) if someone in your family has had colon cancer or polyps before age 50.       Have a cholesterol test every 5 years.       Have a diabetes test (fasting glucose) after age 45. If you are at risk for diabetes, you should have this test every 3 years.    Shots: Get a flu shot each year. Get a tetanus shot every 10 years.     Nutrition:     Eat at least 5 servings of fruits and vegetables each day.    Eat whole-grain bread, whole-wheat pasta and brown rice instead of white grains and rice.    Get adequate Calcium and Vitamin D.      Lifestyle    Exercise at least 150 minutes a week (an average of 30 minutes a day, 5 days a week). This will help you control your weight and prevent disease.    Limit alcohol to one  "drink per day.    No smoking.     Wear sunscreen to prevent skin cancer.    See your dentist every six months for an exam and cleaning.          Follow-ups after your visit        Who to contact     If you have questions or need follow up information about today's clinic visit or your schedule please contact [facility] directly at [phone].  Normal or non-critical lab and imaging results will be communicated to you by MyChart, letter or phone within 4 business days after the clinic has received the results. If you do not hear from us within 7 days, please contact the clinic through [employer] or phone. If you have a critical or abnormal lab result, we will notify you by phone as soon as possible.  Submit refill requests through [employer]Jet or call your pharmacy and they will forward the refill request to us. Please allow 3 business days for your refill to be completed.          Additional Information About Your Visit        WARSTUFFhart Information     App[employer] gives you secure access to your electronic health record. If you see a primary care provider, you can also send messages to your care team and make appointments. If you have questions, please call your primary care clinic.  If you do not have a primary care provider, please call [phone] and they will assist you.        Care EveryWhere ID     This is your Care EveryWhere ID. This could be used by other organizations to access your [facility] medical records  [id number]        Your Vitals Were     Pulse Temperature Respirations Height Last Period Pulse Oximetry    58 99.3  F (37.4  C) (Oral) 16 5' 6\" (1.676 m) 07/02/2018 98%    Breastfeeding? BMI (Body Mass Index)                No 43.09 kg/m2           Blood Pressure from Last 3 Encounters:   08/09/18 126/68   08/23/17 123/61   06/21/17 122/78    Weight from Last 3 Encounters:   08/09/18 267 lb (121.1 kg)   08/23/17 266 lb (120.7 kg)   06/21/17 261 lb 12.8 oz (118.8 kg)              We " Performed the Following     Basic metabolic panel     HPV High Risk Types DNA Cervical     Lipid panel reflex to direct LDL Fasting     Pap imaged thin layer screen with HPV - recommended age 30 - 65 years (select HPV order below)          Where to get your medicines      These medications were sent to [facility] - [place], [place] - [address]  [address], [place] [address]     Phone:  [phone]     metFORMIN 500 MG 24 hr tablet          Primary Care Provider Office Phone # Fax #    Nelli ISAC Nye [phone]       [address] [place]  [facility] [address]        Equal Access to Services     [facility] : Hadii aad ku hadasho Soomaali, waaxda luqadaha, qaybta kaalmada adeegyada, waxay salmain hayaan angelica lala . So [facility] [phone].    ATENCIÓN: Si habla español, tiene a blackwood disposición servicios gratuitos de asistencia lingüística. Llame al [phone].    We comply with applicable federal civil rights laws and [place] laws. We do not discriminate on the basis of race, color, national origin, age, disability, sex, sexual orientation, or gender identity.            Thank you!     Thank you for choosing [facility]  for your care. Our goal is always to provide you with excellent care. Hearing back from our patients is one way we can continue to improve our services. Please take a few minutes to complete the written survey that you may receive in the mail after your visit with us. Thank you!             Your Updated Medication List - Protect others around you: Learn how to safely use, store and throw away your medicines at www.disposemymeds.org.          This list is accurate as of 8/9/18  9:44 AM.  Always use your most recent med list.                   Brand Name Dispense Instructions for use Diagnosis    albuterol 108 (90 Base) MCG/ACT Inhaler    PROAIR HFA/PROVENTIL HFA/VENTOLIN HFA    1 Inhaler    Inhale 2 puffs into the lungs  every 6 hours as needed for shortness of breath / dyspnea or wheezing    Community acquired pneumonia       ALPRAZolam 0.5 MG tablet    XANAX    10 tablet    Take 1-2 tablets 45 minutes prior to flight    Flying phobia       butalbital-aspirin-caffeine -40 MG per capsule    FIORINAL    90 capsule    Take 1 capsule by mouth every 4 hours as needed for pain    Migraine without status migrainosus, not intractable, unspecified migraine type       IBUPROFEN PO      Reported on 4/4/2017        metFORMIN 500 MG 24 hr tablet    GLUCOPHAGE-XR    60 tablet    TAKE 2 TABLETS BY MOUTH DAILY(WITH BREAKFAST)    PCOS (polycystic ovarian syndrome)       order for DME      Reported on 4/4/2017        TYLENOL PO      Take by mouth every 4 hours as needed for mild pain or fever Reported on 4/4/2017

## 2018-08-14 LAB
COPATH REPORT: NORMAL
PAP: NORMAL

## 2018-08-16 LAB
FINAL DIAGNOSIS: NORMAL
HPV HR 12 DNA CVX QL NAA+PROBE: NEGATIVE
HPV16 DNA SPEC QL NAA+PROBE: NEGATIVE
HPV18 DNA SPEC QL NAA+PROBE: NEGATIVE
SPECIMEN DESCRIPTION: NORMAL
SPECIMEN SOURCE CVX/VAG CYTO: NORMAL

## 2018-11-07 ENCOUNTER — TELEPHONE (OUTPATIENT)
Dept: SLEEP MEDICINE | Facility: CLINIC | Age: 43
End: 2018-11-07

## 2018-12-31 ENCOUNTER — TELEPHONE (OUTPATIENT)
Dept: FAMILY MEDICINE | Facility: CLINIC | Age: 43
End: 2018-12-31

## 2018-12-31 NOTE — TELEPHONE ENCOUNTER
Patient scheduled appointment via my chart     1/16/2019    6:30 PM  15 mins.  Nelli Nye, NP    [facility]AC/IMPEDS      Patient Comments:   Reoccurring pain in upper right side of abdomen under rib cage. Pain radiates to other areas sometimes.      Sent to triage RN to ensure patient okay to wait until scheduled appt. with the symptoms noted     Patient waited for this date as she did not want to take off work   Right sided upper abdominal pain that started in her back 3-4 weeks ago   Denies nausea/vomiting/diarrhea   Low grade fever at times   Pain does not correlate with foods that she eats   Had cold symptoms that have resolved   Pain comes and goes - patient wondering if it is musculoskeletal - she will try ibuprofen to see if this helps     Patient will continue to monitor and if symptoms worsen she will be seen in urgent care/ER for evaluation     Marian Christianson Registered Nurse   [facility]

## 2019-01-04 ENCOUNTER — OFFICE VISIT (OUTPATIENT)
Dept: URGENT CARE | Facility: URGENT CARE | Age: 44
End: 2019-01-04
Payer: COMMERCIAL

## 2019-01-04 VITALS
DIASTOLIC BLOOD PRESSURE: 78 MMHG | OXYGEN SATURATION: 97 % | HEART RATE: 61 BPM | WEIGHT: 263 LBS | HEIGHT: 66 IN | TEMPERATURE: 98.7 F | SYSTOLIC BLOOD PRESSURE: 122 MMHG | BODY MASS INDEX: 42.27 KG/M2

## 2019-01-04 DIAGNOSIS — R10.11 RUQ ABDOMINAL PAIN: Primary | ICD-10-CM

## 2019-01-04 PROCEDURE — 99213 OFFICE O/P EST LOW 20 MIN: CPT | Performed by: FAMILY MEDICINE

## 2019-01-04 ASSESSMENT — MIFFLIN-ST. JEOR: SCORE: 1874.71

## 2019-01-05 NOTE — PROGRESS NOTES
Subjective: About a month ago she noticed some posterior right back pain, got some massage there and it actually felt much better but then the pain seemed to come into the right upper quadrant area and it comes and goes, not seemingly set off by anything in particular but sometimes when it is hurting if she takes a deep breath it feels worse.  One morning she felt a popping in that area.  She has polycystic ovary disease and occasionally gets cysts that are painful, a week ago noticed some left lower abdominal pain typical for her cysts but that does not seem connected to this right upper quadrant pain.  Eating does not have any effect on it.  Urination and bowel movements have no effect on it.    Objective: Abdominal exam is really pretty normal at this point.  I cannot identify any painful areas.  Left lower quadrant is normal as well.  No CVA tenderness    Assessment and plan: Persistent right upper quadrant pain.  I think it would be worthwhile to have an ultrasound to send to her primary care doctor and she can follow-up with primary care to try to sort this out.

## 2019-01-07 ENCOUNTER — MYC MEDICAL ADVICE (OUTPATIENT)
Dept: FAMILY MEDICINE | Facility: CLINIC | Age: 44
End: 2019-01-07

## 2019-01-09 ENCOUNTER — HOSPITAL ENCOUNTER (OUTPATIENT)
Dept: ULTRASOUND IMAGING | Facility: CLINIC | Age: 44
Discharge: HOME | End: 2019-01-09
Admitting: FAMILY MEDICINE
Payer: COMMERCIAL

## 2019-01-09 DIAGNOSIS — R10.11 RUQ ABDOMINAL PAIN: ICD-10-CM

## 2019-01-09 PROCEDURE — 76705 ECHO EXAM OF ABDOMEN: CPT

## 2019-01-09 NOTE — RESULT ENCOUNTER NOTE
The gall bladder looks fine. The liver is slightly large but usually that doesn't cause symptoms and rarely is a problem. So I am forwarding the results to Nelli Nye so she will have it for when you go to see her

## 2019-01-16 ENCOUNTER — OFFICE VISIT (OUTPATIENT)
Dept: FAMILY MEDICINE | Facility: CLINIC | Age: 44
End: 2019-01-16
Payer: COMMERCIAL

## 2019-01-16 VITALS
RESPIRATION RATE: 18 BRPM | SYSTOLIC BLOOD PRESSURE: 125 MMHG | DIASTOLIC BLOOD PRESSURE: 83 MMHG | WEIGHT: 265 LBS | HEART RATE: 77 BPM | OXYGEN SATURATION: 98 % | BODY MASS INDEX: 42.77 KG/M2 | TEMPERATURE: 98.3 F

## 2019-01-16 DIAGNOSIS — R10.11 RUQ ABDOMINAL PAIN: Primary | ICD-10-CM

## 2019-01-16 LAB
ALBUMIN UR-MCNC: NEGATIVE MG/DL
APPEARANCE UR: CLEAR
BILIRUB UR QL STRIP: NEGATIVE
COLOR UR AUTO: YELLOW
ERYTHROCYTE [DISTWIDTH] IN BLOOD BY AUTOMATED COUNT: 13.1 % (ref 10–15)
GLUCOSE UR STRIP-MCNC: NEGATIVE MG/DL
HCT VFR BLD AUTO: 43.1 % (ref 35–47)
HGB BLD-MCNC: 13.9 G/DL (ref 11.7–15.7)
HGB UR QL STRIP: NEGATIVE
KETONES UR STRIP-MCNC: ABNORMAL MG/DL
LEUKOCYTE ESTERASE UR QL STRIP: ABNORMAL
MCH RBC QN AUTO: 30.3 PG (ref 26.5–33)
MCHC RBC AUTO-ENTMCNC: 32.3 G/DL (ref 31.5–36.5)
MCV RBC AUTO: 94 FL (ref 78–100)
NITRATE UR QL: NEGATIVE
PH UR STRIP: 7 PH (ref 5–7)
PLATELET # BLD AUTO: 285 10E9/L (ref 150–450)
RBC # BLD AUTO: 4.58 10E12/L (ref 3.8–5.2)
RBC #/AREA URNS AUTO: NORMAL /HPF
SOURCE: ABNORMAL
SP GR UR STRIP: 1.02 (ref 1–1.03)
UROBILINOGEN UR STRIP-ACNC: 0.2 EU/DL (ref 0.2–1)
WBC # BLD AUTO: 10.5 10E9/L (ref 4–11)
WBC #/AREA URNS AUTO: NORMAL /HPF

## 2019-01-16 PROCEDURE — 80053 COMPREHEN METABOLIC PANEL: CPT | Performed by: NURSE PRACTITIONER

## 2019-01-16 PROCEDURE — 85027 COMPLETE CBC AUTOMATED: CPT | Performed by: NURSE PRACTITIONER

## 2019-01-16 PROCEDURE — 81001 URINALYSIS AUTO W/SCOPE: CPT | Performed by: NURSE PRACTITIONER

## 2019-01-16 PROCEDURE — 99214 OFFICE O/P EST MOD 30 MIN: CPT | Performed by: NURSE PRACTITIONER

## 2019-01-16 PROCEDURE — 36415 COLL VENOUS BLD VENIPUNCTURE: CPT | Performed by: NURSE PRACTITIONER

## 2019-01-17 LAB
ALBUMIN SERPL-MCNC: 3.8 G/DL (ref 3.4–5)
ALP SERPL-CCNC: 100 U/L (ref 40–150)
ALT SERPL W P-5'-P-CCNC: 30 U/L (ref 0–50)
ANION GAP SERPL CALCULATED.3IONS-SCNC: 6 MMOL/L (ref 3–14)
AST SERPL W P-5'-P-CCNC: 12 U/L (ref 0–45)
BILIRUB SERPL-MCNC: 0.3 MG/DL (ref 0.2–1.3)
BUN SERPL-MCNC: 9 MG/DL (ref 7–30)
CALCIUM SERPL-MCNC: 9.1 MG/DL (ref 8.5–10.1)
CHLORIDE SERPL-SCNC: 104 MMOL/L (ref 94–109)
CO2 SERPL-SCNC: 27 MMOL/L (ref 20–32)
CREAT SERPL-MCNC: 0.67 MG/DL (ref 0.52–1.04)
GFR SERPL CREATININE-BSD FRML MDRD: >90 ML/MIN/{1.73_M2}
GLUCOSE SERPL-MCNC: 89 MG/DL (ref 70–99)
POTASSIUM SERPL-SCNC: 4.3 MMOL/L (ref 3.4–5.3)
PROT SERPL-MCNC: 7.6 G/DL (ref 6.8–8.8)
SODIUM SERPL-SCNC: 137 MMOL/L (ref 133–144)

## 2019-01-17 NOTE — PROGRESS NOTES
SUBJECTIVE:   Leandra Chris is a 44 year old female who presents to clinic today for the following health issues:  Stopped taking Metformin     Review US Results     She has had a dull ache in the RUQ area for the past month.  It seems to be present most of the time, perhaps worse with certain movements.     She denies fevers, nausea or vomiting, chest pain, shortness of breath, change in bowel habits, melena, hematochezia, dysuria, hematuria.    She was seen in UC and an abdominal US ordered, which showed fatty liver.        Problem list and histories reviewed & adjusted, as indicated.  Additional history: as documented        Reviewed and updated as needed this visit by clinical staff       Reviewed and updated as needed this visit by Provider         ROS:  CONSTITUTIONAL: NEGATIVE for fever, chills, change in weight  ENT/MOUTH: NEGATIVE for ear, mouth and throat problems  RESP: NEGATIVE for significant cough or SOB  CV: NEGATIVE for chest pain, palpitations or peripheral edema  GI: NEGATIVE for nausea, heartburn, or change in bowel habits; see HPI  : see HPI  MUSCULOSKELETAL: NEGATIVE for significant arthralgias or myalgia  NEURO: NEGATIVE for weakness, dizziness or paresthesias  ENDOCRINE: NEGATIVE for temperature intolerance, skin/hair changes  PSYCHIATRIC: NEGATIVE for changes in mood or affect    OBJECTIVE:     /83   Pulse 77   Temp 98.3  F (36.8  C) (Oral)   Resp 18   Wt 120.2 kg (265 lb)   SpO2 98%   BMI 42.77 kg/m    Body mass index is 42.77 kg/m .  GENERAL: healthy, alert and no distress  RESP: lungs clear to auscultation - no rales, rhonchi or wheezes  CV: regular rate and rhythm, normal S1 S2, no S3 or S4, no murmur, click or rub, no peripheral edema and peripheral pulses strong  ABDOMEN: soft, nontender, no hepatosplenomegaly, no masses and bowel sounds normal  MS: mild localized tenderness anterior right lower rib  PSYCH: mentation appears normal, affect  normal/bright        ASSESSMENT/PLAN:             1. RUQ abdominal pain  No evidence for cholelithiasis.cholecystitis on US.  Willl check labs to assess kidney and liver function, WBC and hgb.   If labs are normal, favor musculoskeletal etiology.  Will monitor for improvement over the next couple of weeks.  If no improvement or if symptoms worsen, will consider referral to GI.    - Comprehensive metabolic panel  - *UA reflex to Microscopic  - CBC with platelets        Nelli Nye NP  [facility]

## 2019-06-10 ENCOUNTER — OFFICE VISIT (OUTPATIENT)
Dept: FAMILY MEDICINE | Facility: CLINIC | Age: 44
End: 2019-06-10
Payer: COMMERCIAL

## 2019-06-10 DIAGNOSIS — E28.2 PCOS (POLYCYSTIC OVARIAN SYNDROME): ICD-10-CM

## 2019-06-10 DIAGNOSIS — F33.1 MODERATE EPISODE OF RECURRENT MAJOR DEPRESSIVE DISORDER (H): Primary | ICD-10-CM

## 2019-06-10 DIAGNOSIS — E66.01 MORBID OBESITY WITH BMI OF 40.0-44.9, ADULT (H): ICD-10-CM

## 2019-06-10 PROCEDURE — 82306 VITAMIN D 25 HYDROXY: CPT | Performed by: NURSE PRACTITIONER

## 2019-06-10 PROCEDURE — 36415 COLL VENOUS BLD VENIPUNCTURE: CPT | Performed by: NURSE PRACTITIONER

## 2019-06-10 PROCEDURE — 84443 ASSAY THYROID STIM HORMONE: CPT | Performed by: NURSE PRACTITIONER

## 2019-06-10 PROCEDURE — 99214 OFFICE O/P EST MOD 30 MIN: CPT | Performed by: NURSE PRACTITIONER

## 2019-06-10 RX ORDER — BUPROPION HYDROCHLORIDE 150 MG/1
TABLET ORAL
Qty: 60 TABLET | Refills: 1 | Status: SHIPPED | OUTPATIENT
Start: 2019-06-10 | End: 2019-07-08

## 2019-06-10 RX ORDER — METFORMIN HCL 500 MG
TABLET, EXTENDED RELEASE 24 HR ORAL
Qty: 60 TABLET | Status: SHIPPED | OUTPATIENT
Start: 2019-06-10 | End: 2019-12-05

## 2019-06-10 ASSESSMENT — PATIENT HEALTH QUESTIONNAIRE - PHQ9: SUM OF ALL RESPONSES TO PHQ QUESTIONS 1-9: 15

## 2019-06-10 NOTE — PROGRESS NOTES
Subjective     Leandra Chris is a 44 year old female who presents to clinic today for the following health issues:    HPI   Abnormal Mood Symptoms      Duration: since the baby was born (7 weeks ago)    Description:  Depression: YES  Anxiety: no   Panic attacks: no     Accompanying signs and symptoms: lack of sleep     see PHQ-9 and DUNCAN scores    History (similar episodes/previous evaluation): None    Precipitating or alleviating factors: None    Therapies tried and outcome: effexor in the past     She is noticing decreased energy, fatigue, decreased motivation, anhedonia and difficulty concentrating.    She has had depression in the past and was on Effexor, which worked well, but was difficult to get off of.    She denies any SI.               Reviewed and updated as needed this visit by Provider         Review of Systems   ROS COMP: CONSTITUTIONAL: NEGATIVE for fever, chills, change in weight  ENT/MOUTH: NEGATIVE for ear, mouth and throat problems  RESP: NEGATIVE for significant cough or SOB  CV: NEGATIVE for chest pain, palpitations or peripheral edema  GI: NEGATIVE for nausea, abdominal pain, heartburn, or change in bowel habits  MUSCULOSKELETAL: NEGATIVE for significant arthralgias or myalgia  NEURO: NEGATIVE for weakness, dizziness or paresthesias  PSYCHIATRIC: see HPI      Objective    BP (P) 114/80   Pulse (P) 79   Temp (P) 98.8  F (37.1  C) (Oral)   Resp (P) 18   Wt (P) 118.8 kg (262 lb)   SpO2 (P) 98%   BMI (P) 42.29 kg/m    Body mass index is 42.29 kg/m  (pended).  Physical Exam   GENERAL: healthy, alert and no distress  PSYCH: mentation appears normal, affect flattened; PHQ-9 score of 154            Assessment & Plan     1. Moderate episode of recurrent major depressive disorder (H)  Initiate medication with Wellbutrin  Reviewed concept of depression as function of biochemical imbalance of neurotransmitters/rationale for treatment.  Followup appointment in 1 month(s)  Will check labs to rule out  "hypothyroidism and vitamin D deficiency.   Patient instructed to call for significant side effects medications or problems  Patient advised immediate presentation to hospital for suicidal thought, etc.      - buPROPion (WELLBUTRIN XL) 150 MG 24 hr tablet; Take one tablet daily for one week, then increase to 2 tablets daily  Dispense: 60 tablet; Refill: 1  - Vitamin D Deficiency  - TSH with free T4 reflex    2. PCOS (polycystic ovarian syndrome)  Restart Metformin after she has acclimated to Wellbutrin.   - metFORMIN (GLUCOPHAGE-XR) 500 MG 24 hr tablet; TAKE 2 TABLETS BY MOUTH DAILY(WITH BREAKFAST)  Dispense: 60 tablet; Refill: PRN    3. BMI of 40.0-44.9, adult (H)  Discussed diet, exercise.        BMI:   Estimated body mass index is 42.29 kg/m  (pended) as calculated from the following:    Height as of 1/4/19: 1.676 m (5' 6\").    Weight as of this encounter: (P) 118.8 kg (262 lb).   Weight management plan: Discussed healthy diet and exercise guidelines            Return in about 1 month (around 7/8/2019).    Nelli Nye NP  [facility]        "

## 2019-06-11 DIAGNOSIS — G47.33 OBSTRUCTIVE SLEEP APNEA (ADULT) (PEDIATRIC): Primary | ICD-10-CM

## 2019-06-11 LAB
DEPRECATED CALCIDIOL+CALCIFEROL SERPL-MC: 24 UG/L (ref 20–75)
TSH SERPL DL<=0.005 MIU/L-ACNC: 0.6 MU/L (ref 0.4–4)

## 2019-06-30 NOTE — PROGRESS NOTES
Sleep medicine follow-up visit note     Date on  visit: 8/23/2017     Purpose of visit: Follow-up of SERGE    History of present illness: Ms. Leandra Chris is a 42 yr old  female w/ PMH significant for SERGE, PCOS, and depression who is here for follow up of her SERGE. She was last seen in 8/2016. She underwent PSG in Jan 2011,  when she was noted to have moderate SERGE with AHI=21 per hr, REM AHI = 37 per hr.      She reports using her CPAP device  regularly during sleep. There have not been any reports of snoring while she is using her CPAP. She denies awakenings due to gasping for air or choking with the device. She reports good sleep quality with the use of the device.   Lately, her 3-year-old daughter has been waking up in the middle of the night and Leandra has been  waking up and sleeping with her to comfort her, not getting enough sleep  and her ESS is score is 12 out of 24.  However, she denies fatigue and EDS, if she gets enough sleep time at night. She denies concerns about drowsiness while driving.   She reports weight gain since January 2017 ever since she quit smoking. She has started exercising, has been going to the [employer].  Downloadable compliance data from 7/24/ 2017 through 8/22/2017 reveals that she has used a device for 28 out of 30 days with an average daily use   of 7 hours and 23 minutes. Median pressure 8.5, 95th percentile 9.8 and Max pressure 10.5 cm of water; 95th percentile air leak was 1.6 L per minute. Residual AHI was 1.4 per hour.     Current meds:  Current Outpatient Medications   Medication Sig Dispense Refill     Acetaminophen (TYLENOL PO) Take by mouth every 4 hours as needed for mild pain or fever Reported on 4/4/2017       ALPRAZolam (XANAX) 0.5 MG tablet Take 1-2 tablets 45 minutes prior to flight (Patient not taking: Reported on 6/10/2019) 10 tablet 0     buPROPion (WELLBUTRIN XL) 150 MG 24 hr tablet Take one tablet daily for one week, then increase to 2 tablets daily 60 tablet 1      butalbital-aspirin-caffeine (FIORINAL) -40 MG per capsule Take 1 capsule by mouth every 4 hours as needed for pain (Patient not taking: Reported on 6/10/2019) 90 capsule 1     IBUPROFEN PO Take 400 mg by mouth as needed Reported on 2017       metFORMIN (GLUCOPHAGE-XR) 500 MG 24 hr tablet TAKE 2 TABLETS BY MOUTH DAILY(WITH BREAKFAST) 60 tablet PRN     ORDER FOR DME Reported on 2017       Past medical history:  Past Medical History:   Diagnosis Date     Depressive disorder      LSIL (low grade squamous intraepithelial lesion) on Pap smear     colp neg     Migraines      Mild major depression (H) 2018     PCOS (polycystic ovarian syndrome)      Pneumonia      Patient Active Problem List   Diagnosis     PCOS (polycystic ovarian syndrome)     Anxiety     Vitamin D deficiency     Insomnia     CARDIOVASCULAR SCREENING; LDL GOAL LESS THAN 160     Migraine headache     Obesity     BMI of 40.0-44.9, adult (H)     Past surgical history:  Past Surgical History:   Procedure Laterality Date     NO HISTORY OF SURGERY       Allergies:  Allergies   Allergen Reactions     Penicillin [Penicillins]      Trazodone      Other reaction(s): Headache  Pt reports after taking trazodone she had a migraine requiring IM medication     Shellfish-Derived Products Rash     Social history:  Social History     Tobacco Use     Smoking status: Former Smoker     Packs/day: 0.50     Years: 13.00     Pack years: 6.50     Types: Cigarettes     Start date: 1999     Last attempt to quit: 2012     Years since quittin.4     Smokeless tobacco: Never Used   Substance Use Topics     Alcohol use: Yes     Comment: rarely     Drug use: No     Family history:   Family History   Problem Relation Age of Onset     Prostate Cancer Father      Hypertension Maternal Grandmother      Cerebrovascular Disease Maternal Grandmother      Substance Abuse Brother      Asthma No family hx of      C.A.D. No family hx of      Diabetes No family hx  "of      Breast Cancer No family hx of      Cancer - colorectal No family hx of      Physical exam:  There were no vitals taken for this visit.  General appearance:  in no apparent distress  Pt is dressed casually, cooperative with good eye contact.   Speech is spontaneous with regular rate and volume.   Mood: euthymic; affect congruent with full range and intensity.   Sensorium: awake, alert and oriented to person, place, time, and situation.    Assessment/Plan:   Previously diagnosed moderate obstructive sleep apnea, pronounced during REM sleep: Patient reports adequate compliance with the CPAP device and based on the compliance measures, SERGE appears to be adequately controlled with CPAP at the current pressure settings. Recommended her  to continue using the CPAP regularly during sleep and instructed her to get the supplies for the devices regularly replaced. Prescription for renewal of all the CPAP supplies was provided during today's visit.    She was recommended to  avoid  sleep deprivation  and aim at obtaining 7 to 8 hours of sleep per night. She was instructed not to drive if  drowsy or sleepy to prevent accidents.   We discussed weight management.  She will follow up at the sleep clinic in one year or sooner if there any concerns.    Chart documentation done in part with Dragon Voice recognition Software. Although reviewed after completion, some word and grammatical error may remain.    \"I spent a total of 25 minutes face to face with Leandra Chris during today's office  visit. Over 50% of this time was spent counseling the patient and  coordinating care regarding sleep apnea and avoiding sleep deprivation.\"       Gita Pugh MD   of Medicine,  Division of Pulmonary/Sleep Medicine  [facility].    "

## 2019-07-01 ENCOUNTER — OFFICE VISIT (OUTPATIENT)
Dept: SLEEP MEDICINE | Facility: CLINIC | Age: 44
End: 2019-07-01
Payer: COMMERCIAL

## 2019-07-01 VITALS
BODY MASS INDEX: 42.11 KG/M2 | RESPIRATION RATE: 16 BRPM | OXYGEN SATURATION: 95 % | DIASTOLIC BLOOD PRESSURE: 74 MMHG | HEART RATE: 84 BPM | HEIGHT: 66 IN | SYSTOLIC BLOOD PRESSURE: 112 MMHG | WEIGHT: 262 LBS

## 2019-07-01 DIAGNOSIS — G47.33 OSA (OBSTRUCTIVE SLEEP APNEA): Primary | ICD-10-CM

## 2019-07-01 DIAGNOSIS — E66.01 CLASS 3 SEVERE OBESITY WITH BODY MASS INDEX (BMI) OF 40.0 TO 44.9 IN ADULT, UNSPECIFIED OBESITY TYPE, UNSPECIFIED WHETHER SERIOUS COMORBIDITY PRESENT (H): ICD-10-CM

## 2019-07-01 DIAGNOSIS — E66.813 CLASS 3 SEVERE OBESITY WITH BODY MASS INDEX (BMI) OF 40.0 TO 44.9 IN ADULT, UNSPECIFIED OBESITY TYPE, UNSPECIFIED WHETHER SERIOUS COMORBIDITY PRESENT (H): ICD-10-CM

## 2019-07-01 DIAGNOSIS — G47.26 CIRCADIAN RHYTHM SLEEP DISORDER, SHIFT WORK TYPE: ICD-10-CM

## 2019-07-01 PROCEDURE — 99214 OFFICE O/P EST MOD 30 MIN: CPT | Performed by: NURSE PRACTITIONER

## 2019-07-01 ASSESSMENT — MIFFLIN-ST. JEOR: SCORE: 1865.17

## 2019-07-01 NOTE — PROGRESS NOTES
Cc: Patient returns here today in follow up for new script for supllies and evaluate compliance and response to cpap    HPI: Ms. Leandra Chris is a 42 yr old  female w/ PMH significant for SERGE, PCOS, and depression who is here for follow up of her SERGE. She was last seen in 8/2016. She underwent PSG in Jan 2011,  when she was noted to have moderate SERGE with AHI=21 per hr, REM AHI = 37 per hr.       DME:[facility]  Last supplies/mask:supplies old, nasal pillows:    New concerns/changes in health:old mask and script 11 for mask, nP    CPAP Review of Systems: the following SYMPTOMS/CONCERNS have been reviewed are negative with exception of the following:     Equipment issues      Snoring, snort arousals, gasping while on therapy     Bedpartner c/o's of cpap     Heated humidity problems with rainout/excessive dryness, sore throat     Opening of mouth while on therapy/excessive leak    Rhinitis     Swallowing air     Skin problems    SLEEP SCHEDULE: REVIEW IS NEGATIVE WITH EXCEPTION OF THE FOLLOWING:     TST:devoting >=7 or more hours to sleep     Problems falling or staying asleep with cpap: refalling with late wakeup    Naps; 10-15 mins    RLS:    SLEEP SCHEDULE:  Splitting care of baby with spouse, <7 hrs most nights    Download today:8-14cm h20, AHI 1.0, 95TH PERCENTILE PRESSURE 9.2 AND INSIGNIFICANT LEAK, AVG USAGE DAYS USED 6:01, 4 DAYS WITH USE LESS THAN 4 HRS, 6 DAYS NO USE      Full report scanned into medical record    Response to therapy:better sleep on cpap  Willingness to continue: yes    Allergies:    Allergies   Allergen Reactions     Penicillin [Penicillins]      Trazodone      Other reaction(s): Headache  Pt reports after taking trazodone she had a migraine requiring IM medication     Shellfish-Derived Products Rash       Medications:    Current Outpatient Medications   Medication Sig Dispense Refill     Acetaminophen (TYLENOL PO) Take by mouth every 4 hours as needed for mild pain or fever Reported on  "4/4/2017       ALPRAZolam (XANAX) 0.5 MG tablet Take 1-2 tablets 45 minutes prior to flight 10 tablet 0     buPROPion (WELLBUTRIN XL) 150 MG 24 hr tablet Take one tablet daily for one week, then increase to 2 tablets daily 60 tablet 1     butalbital-aspirin-caffeine (FIORINAL) -40 MG per capsule Take 1 capsule by mouth every 4 hours as needed for pain 90 capsule 1     IBUPROFEN PO Take 400 mg by mouth as needed Reported on 4/4/2017       metFORMIN (GLUCOPHAGE-XR) 500 MG 24 hr tablet TAKE 2 TABLETS BY MOUTH DAILY(WITH BREAKFAST) 60 tablet PRN     ORDER FOR DME Reported on 4/4/2017         Problem List:  Patient Active Problem List    Diagnosis Date Noted     BMI of 40.0-44.9, adult (H) 04/04/2017     Priority: Medium     Migraine headache 10/21/2011     Priority: Medium     Obesity 10/21/2011     Priority: Medium     CARDIOVASCULAR SCREENING; LDL GOAL LESS THAN 160 10/31/2010     Priority: Medium     Insomnia 07/08/2010     Priority: Medium     Anxiety 04/09/2009     Priority: Medium     Vitamin D deficiency 04/09/2009     Priority: Medium     PCOS (polycystic ovarian syndrome) 02/20/2009     Priority: Medium        Past Medical/Surgical History:  Past Medical History:   Diagnosis Date     Depressive disorder      LSIL (low grade squamous intraepithelial lesion) on Pap smear 2008    colp neg     Migraines      Mild major depression (H) 7/31/2018     PCOS (polycystic ovarian syndrome)      Pneumonia      Past Surgical History:   Procedure Laterality Date     NO HISTORY OF SURGERY       Physical Examination:  Vitals: /74   Pulse 84   Resp 16   Ht 1.676 m (5' 6\")   Wt 118.8 kg (262 lb)   SpO2 95%   BMI 42.29 kg/m    BMI= Body mass index is 42.29 kg/m .         Williams Total Score 7/1/2019   Total score - Williams 14       GENERAL APPEARANCE: healthy, alert and no distress    A/P: sleeps better with cpap.  Sleepiness due to new baby, splitting night with childcare    1. SERGE, moderate, good coverge  2. " Obesity: enc to loose wt to reduce severity of nusrat  3. 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

## 2019-07-01 NOTE — PATIENT INSTRUCTIONS
[place] with [place] care duties, maybe switching off every other night  Napping: <30 mins  No driving if sleepy    Until you are seen again in clinic in the next12- 24 months, please watch for a return of sleepiness. An increase in sleepiness while using cpap for obstructive sleep apnea is usually due to a change in the followin) a decrease in usage of cpap  2) decrease in amount of time slept  3) a poor seal (often a function of not following cleaning recommendations or  replacement guidelines)  4)  increase in weight or use of sedating medications resulting in higher pressure needs     If you have tried to address these issues but are still sleepy, please call for an earlier appointment.    Please, do not drive if sleepy    Your BMI is Body mass index is 42.29 kg/m . 15-30# weightloss can make a big difference in sleep apnea  Weight management is a personal decision.  If you are interested in exploring weight loss strategies, the following discussion covers the approaches that may be successful. Body mass index (BMI) is one way to tell whether you are at a healthy weight, overweight, or obese. It measures your weight in relation to your height.  A BMI of 18.5 to 24.9 is in the healthy range. A person with a BMI of 25 to 29.9 is considered overweight, and someone with a BMI of 30 or greater is considered obese. More than two-thirds of American adults are considered overweight or obese.  Being overweight or obese increases the risk for further weight gain. Excess weight may lead to heart disease and diabetes.  Creating and following plans for healthy eating and physical activity may help you improve your health.  Weight control is part of healthy lifestyle and includes exercise, emotional health, and healthy eating habits. Careful eating habits lifelong are the mainstay of weight control. Though there are significant health benefits from weight loss, long-term weight loss with diet alone may be very difficult  to achieve- studies show long-term success with dietary management in less than 10% of people. Attaining a healthy weight may be especially difficult to achieve in those with severe obesity. In some cases, medications, devices and surgical management might be considered.  What can you do?  If you are overweight or obese and are interested in methods for weight loss, you should discuss this with your provider.     Consider reducing daily calorie intake by 500 calories.     Keep a food journal.     Avoiding skipping meals, consider cutting portions instead.    Diet combined with exercise helps maintain muscle while optimizing fat loss. Strength training is particularly important for building and maintaining muscle mass. Exercise helps reduce stress, increase energy, and improves fitness. Increasing exercise without diet control, however, may not burn enough calories to loose weight.       Start walking three days a week 10-20 minutes at a time    Work towards walking thirty minutes five days a week     Eventually, increase the speed of your walking for 1-2 minutes at time    In addition, we recommend that you review healthy lifestyles and methods for weight loss available through the National Institutes of Health patient information sites:  http://win.niddk.nih.gov/publications/index.htm    And look into health and wellness programs that may be available through your health insurance provider, employer, local community center, or owen club.    Weight management plan: Patient was referred to their PCP to discuss a diet and exercise plan.

## 2019-07-03 ENCOUNTER — TELEPHONE (OUTPATIENT)
Dept: SLEEP MEDICINE | Facility: CLINIC | Age: 44
End: 2019-07-03

## 2019-07-03 NOTE — TELEPHONE ENCOUNTER
I called patient today 07/03/19 at 2:50 pm to see if she would like to renew any pap supplies. No answer left message for patient to call [facility] to reorder pap supplies. Left phone number as well.

## 2019-07-08 ENCOUNTER — OFFICE VISIT (OUTPATIENT)
Dept: FAMILY MEDICINE | Facility: CLINIC | Age: 44
End: 2019-07-08
Payer: COMMERCIAL

## 2019-07-08 VITALS
OXYGEN SATURATION: 97 % | BODY MASS INDEX: 41.97 KG/M2 | WEIGHT: 260 LBS | HEART RATE: 72 BPM | DIASTOLIC BLOOD PRESSURE: 76 MMHG | SYSTOLIC BLOOD PRESSURE: 131 MMHG | TEMPERATURE: 98.4 F | RESPIRATION RATE: 18 BRPM

## 2019-07-08 DIAGNOSIS — F33.1 MODERATE EPISODE OF RECURRENT MAJOR DEPRESSIVE DISORDER (H): ICD-10-CM

## 2019-07-08 PROCEDURE — 99213 OFFICE O/P EST LOW 20 MIN: CPT | Performed by: NURSE PRACTITIONER

## 2019-07-08 RX ORDER — BUPROPION HYDROCHLORIDE 150 MG/1
TABLET ORAL
Qty: 90 TABLET | Refills: 5 | Status: SHIPPED | OUTPATIENT
Start: 2019-07-08 | End: 2019-12-05

## 2019-07-08 NOTE — PROGRESS NOTES
"Subjective     Leandra Chris is a 44 year old female who presents to clinic today for the following health issues:    HPI   Medication Followup of Wellbutrin    Taking Medication as prescribed: yes    Side Effects:  None    Medication Helping Symptoms:  yes   She was started on Wellbutrin about a month ago.  She is tolerating it well.  Unsure of improvements.  She was having anhedonia, decreased energy, fatigue, decreased motivation.    She is still feeling fatigued.  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               Reviewed and updated as needed this visit by Provider         Review of Systems   ROS COMP: CONSTITUTIONAL: NEGATIVE for fever, chills, change in weight  ENT/MOUTH: NEGATIVE for ear, mouth and throat problems  RESP: NEGATIVE for significant cough or SOB  CV: NEGATIVE for chest pain, palpitations or peripheral edema  GI: NEGATIVE for nausea, abdominal pain, heartburn, or change in bowel habits  MUSCULOSKELETAL: NEGATIVE for significant arthralgias or myalgia  NEURO: NEGATIVE for weakness, dizziness or paresthesias  PSYCHIATRIC: see HPI      Objective    /76 (BP Location: Left arm, Patient Position: Sitting, Cuff Size: Adult Regular)   Pulse 72   Temp 98.4  F (36.9  C) (Oral)   Resp 18   Wt 117.9 kg (260 lb)   SpO2 97%   BMI 41.97 kg/m    Body mass index is 41.97 kg/m .  Physical Exam   GENERAL: healthy, alert and no distress  PSYCH: mentation appears normal, affect normal; PHQ-9 score of 9            Assessment & Plan     1. Moderate episode of recurrent major depressive disorder (H)  Will increase Wellbutrin XL to 450 mg and follow up in one month if needed.  - buPROPion (WELLBUTRIN XL) 150 MG 24 hr tablet; Take 3 tablets daily  Dispense: 90 tablet; Refill: 5     BMI:   Estimated body mass index is 41.97 kg/m  as calculated from the following:    Height as of 7/1/19: 1.676 m (5' 6\").    Weight as " of this encounter: 117.9 kg (260 lb).   Weight management plan: Discussed healthy diet and exercise guidelines            No follow-ups on file.    Nelli Nye NP  [facility]

## 2019-07-24 ENCOUNTER — MYC REFILL (OUTPATIENT)
Dept: FAMILY MEDICINE | Facility: CLINIC | Age: 44
End: 2019-07-24

## 2019-07-24 DIAGNOSIS — F40.243 FLYING PHOBIA: ICD-10-CM

## 2019-07-25 RX ORDER — ALPRAZOLAM 0.5 MG
TABLET ORAL
Qty: 10 TABLET | Refills: 0 | Status: SHIPPED | OUTPATIENT
Start: 2019-07-25 | End: 2019-12-05

## 2019-07-25 NOTE — TELEPHONE ENCOUNTER
Routing refill request to provider for review/approval because:  Drug not on the [facility] refill protocol     Last visit 7/8/2019  Last fill 2/12/2018 #10 no refills

## 2019-07-29 NOTE — TELEPHONE ENCOUNTER
I [facility]ed Rx [facility] 0.5 MG [facility] [place]  Start date: 07/25/2019    Anastasiya aCbrera MA

## 2019-08-22 DIAGNOSIS — G43.909 MIGRAINE WITHOUT STATUS MIGRAINOSUS, NOT INTRACTABLE, UNSPECIFIED MIGRAINE TYPE: ICD-10-CM

## 2019-08-22 RX ORDER — BUTALBITAL/ASPIRIN/CAFFEINE 50-325-40
1 CAPSULE ORAL EVERY 4 HOURS PRN
Qty: 10 CAPSULE | Refills: 1 | Status: SHIPPED | OUTPATIENT
Start: 2019-08-22 | End: 2020-08-16

## 2019-08-22 NOTE — TELEPHONE ENCOUNTER
LM informing pt that medication was sent to pharmacy in [place].       Mariana AVALOS     [facility]

## 2019-08-22 NOTE — TELEPHONE ENCOUNTER
Reason for Call:  Medication or medication refill:    Do you use a [facility]?  Name of the pharmacy and phone number for the current request:  [id number] - [place], [place] - [address] AT [facility] [address]     Name of the medication requested: butalbital-aspirin-caffeine (FIORINAL) -40 MG per capsule    Other request: Patient is out of town for her mothers  & forgot rx at home. She's hoping to get a refill sent to a pharmacy in [place] to last her until she gets back. Please advise, thank you!    Can we leave a detailed message on this number? YES    Phone number patient can be reached at: Home number on file [phone] (home)    Best Time: anytime    Call taken on 2019 at 12:55 PM by Harper Lee

## 2019-09-03 ENCOUNTER — MYC MEDICAL ADVICE (OUTPATIENT)
Dept: FAMILY MEDICINE | Facility: CLINIC | Age: 44
End: 2019-09-03

## 2019-09-10 NOTE — TELEPHONE ENCOUNTER
I can do one FMLA form for intermittent leave/reduced hours and then I can fill out another one for continuous time off once she knows the dates for the day treatment program.  Can she upload or fax me an FMLA form or does she want me to just write a letter?

## 2019-09-11 NOTE — TELEPHONE ENCOUNTER
I mycharted patient: Does she want us to fax, Mail, or  forms?    Forms in grand nurse basket    Anastasiya Cabrera MA

## 2019-09-18 ENCOUNTER — MYC MEDICAL ADVICE (OUTPATIENT)
Dept: FAMILY MEDICINE | Facility: CLINIC | Age: 44
End: 2019-09-18

## 2019-09-18 NOTE — TELEPHONE ENCOUNTER
LOV: 7/8/2019    Paperwork for FMLA attached in in [employer] message on 9/3. Please advise on patient request from [employer] today 9/18.     Thanks! Izzy Desai RN

## 2019-09-18 NOTE — TELEPHONE ENCOUNTER
I would recommend having her come in and see me so we can discuss a plan for work if she thinks she will need to be off before the program starts.

## 2019-09-19 ENCOUNTER — TELEPHONE (OUTPATIENT)
Dept: FAMILY MEDICINE | Facility: CLINIC | Age: 44
End: 2019-09-19

## 2019-09-19 NOTE — TELEPHONE ENCOUNTER
Reason for Call:  Other call back    Detailed comments: Debra Norwood,  at [facility] calling to talk to Nelli Nye about FMLA forms and coordination of care. 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 It would be better if Pt could see Nelli ASAP or if talking to Debra would help Nelli to complete paper work. Debra faxed today a form that the Pt signed for Nelli to communicate with Debra.    Phone Number Patient can be reached at: Other phone number:  [phone] Debra's Cell    Best Time: Any Time    Can we leave a detailed message on this number? YES    Call taken on 9/19/2019 at 4:44 PM by Maria Fernanda Back

## 2019-09-20 ENCOUNTER — MYC MEDICAL ADVICE (OUTPATIENT)
Dept: NURSING | Facility: CLINIC | Age: 44
End: 2019-09-20

## 2019-09-20 NOTE — TELEPHONE ENCOUNTER
"Rebecca Nye review: FYI  Update: . Debra mental health counselor for Leandra is letting you know that patient is registered for a 4 week mental health program for depression at [facility] starting 10/14. 9:30-3:30 pm daily.  This is strongly supported by the counselor.    Patient is needing to move up her\" FMLA forms\" visit from 9/30 to possibly 9/25? (same day hold?) as this is needed so she can get a sub teacher in place.   School won't process anything without the paper work.    Nurse reaching out to patient to see if she is available to come in on 9/25.   Norma Morales RN      "

## 2019-09-22 ENCOUNTER — MYC MEDICAL ADVICE (OUTPATIENT)
Dept: FAMILY MEDICINE | Facility: CLINIC | Age: 44
End: 2019-09-22

## 2019-09-26 ENCOUNTER — OFFICE VISIT (OUTPATIENT)
Dept: FAMILY MEDICINE | Facility: CLINIC | Age: 44
End: 2019-09-26
Payer: COMMERCIAL

## 2019-09-26 VITALS
OXYGEN SATURATION: 97 % | DIASTOLIC BLOOD PRESSURE: 85 MMHG | HEART RATE: 91 BPM | SYSTOLIC BLOOD PRESSURE: 110 MMHG | TEMPERATURE: 99.6 F

## 2019-09-26 DIAGNOSIS — G47.00 INSOMNIA, UNSPECIFIED TYPE: ICD-10-CM

## 2019-09-26 DIAGNOSIS — F32.1 MODERATE MAJOR DEPRESSION (H): Primary | ICD-10-CM

## 2019-09-26 PROCEDURE — 99214 OFFICE O/P EST MOD 30 MIN: CPT | Performed by: NURSE PRACTITIONER

## 2019-09-26 RX ORDER — ESCITALOPRAM OXALATE 10 MG/1
TABLET ORAL
Qty: 30 TABLET | Refills: 1 | Status: SHIPPED | OUTPATIENT
Start: 2019-09-26 | End: 2019-12-05

## 2019-09-26 RX ORDER — ZOLPIDEM TARTRATE 5 MG/1
5 TABLET ORAL
Qty: 30 TABLET | Refills: 0 | Status: SHIPPED | OUTPATIENT
Start: 2019-09-26 | End: 2020-05-26

## 2019-09-26 ASSESSMENT — PATIENT HEALTH QUESTIONNAIRE - PHQ9: SUM OF ALL RESPONSES TO PHQ QUESTIONS 1-9: 19

## 2019-09-26 NOTE — PROGRESS NOTES
SUBJECTIVE:  Presents today for follow up of depressive symptoms. Current symptoms include depressed mood, diminished interest or pleasure in activities, insomnia, difficulty with motivation, difficulty with concentration or focus, feeling overwhelmed, fatigue and hopelessness. She has had some passive suicidal thoughts, but no plan or intent to act.  She is on Wellbutrin  mg daily.  Symptoms have been worsening.  Depression risk factors: negative life event her mother passed away.    She is seeing a therapist.  She is finding it very difficult to work or function effectively at home.  She is scheduled to start a partial hospitalization program on 10/21, but does not think she can continue to work until then.  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  The program will last at least 4 weeks.    Past medical and surgical history, family history and social history were reviewed and are up to date.    REVIEW OF SYSTEMS  CONSTITUTIONAL: NEGATIVE for fever, chills  RESP: NEGATIVE for significant cough or SOB  CV: NEGATIVE for chest pain, palpitations   PSYCHIATRIC: see HPI      OBJECTIVE:  44 year old  female  in no acute distress.  Normal: Coherency and relevance of thought,  Dress, grooming, personal hygiene  Thought content  Abnormal:  Mood - flattened affect, PHQ-9 score of 19    ASSESSMENT:  (F32.1) Moderate major depression (H)  (primary encounter diagnosis)  Comment:   Plan: escitalopram (LEXAPRO) 10 MG tablet        Will start Lexapro.  Discussed the use and indication of this medication as well as potential side effects.   Continue with Wellbutrin.  FMLA form completed to be off work starting 10/14,  But she will let me know if this needs to be sooner.  Follow up with day program.  Continue to see her therapist.   Patient instructed to call for significant side effects medications or problems  Patient advised immediate presentation to hospital for suicidal thought, etc.        (G47.00) Insomnia,  unspecified type  Comment:   Plan: zolpidem (AMBIEN) 5 MG tablet        Discussed the use and indication of this medication as well as potential side effects.

## 2019-11-05 ENCOUNTER — HEALTH MAINTENANCE LETTER (OUTPATIENT)
Age: 44
End: 2019-11-05

## 2019-12-03 ASSESSMENT — ANXIETY QUESTIONNAIRES
5. BEING SO RESTLESS THAT IT IS HARD TO SIT STILL: NOT AT ALL
GAD7 TOTAL SCORE: 4
6. BECOMING EASILY ANNOYED OR IRRITABLE: SEVERAL DAYS
GAD7 TOTAL SCORE: 4
7. FEELING AFRAID AS IF SOMETHING AWFUL MIGHT HAPPEN: SEVERAL DAYS
4. TROUBLE RELAXING: NOT AT ALL
3. WORRYING TOO MUCH ABOUT DIFFERENT THINGS: SEVERAL DAYS
1. FEELING NERVOUS, ANXIOUS, OR ON EDGE: SEVERAL DAYS
7. FEELING AFRAID AS IF SOMETHING AWFUL MIGHT HAPPEN: SEVERAL DAYS
2. NOT BEING ABLE TO STOP OR CONTROL WORRYING: NOT AT ALL
GAD7 TOTAL SCORE: 4

## 2019-12-03 ASSESSMENT — ENCOUNTER SYMPTOMS
PARESTHESIAS: 0
FREQUENCY: 0
PALPITATIONS: 0
DIZZINESS: 0
DYSURIA: 0
JOINT SWELLING: 0
NAUSEA: 0
ABDOMINAL PAIN: 1
HEMATOCHEZIA: 0
FEVER: 0
CONSTIPATION: 0
SORE THROAT: 0
HEMATURIA: 0
HEARTBURN: 0
COUGH: 0
NERVOUS/ANXIOUS: 0
ARTHRALGIAS: 0
EYE PAIN: 1
CHILLS: 0
WEAKNESS: 0
MYALGIAS: 0
SHORTNESS OF BREATH: 0
HEADACHES: 1
DIARRHEA: 0
BREAST MASS: 0

## 2019-12-03 ASSESSMENT — PATIENT HEALTH QUESTIONNAIRE - PHQ9
10. IF YOU CHECKED OFF ANY PROBLEMS, HOW DIFFICULT HAVE THESE PROBLEMS MADE IT FOR YOU TO DO YOUR WORK, TAKE CARE OF THINGS AT HOME, OR GET ALONG WITH OTHER PEOPLE: VERY DIFFICULT
SUM OF ALL RESPONSES TO PHQ QUESTIONS 1-9: 16
SUM OF ALL RESPONSES TO PHQ QUESTIONS 1-9: 16

## 2019-12-04 ASSESSMENT — ANXIETY QUESTIONNAIRES: GAD7 TOTAL SCORE: 4

## 2019-12-04 ASSESSMENT — PATIENT HEALTH QUESTIONNAIRE - PHQ9: SUM OF ALL RESPONSES TO PHQ QUESTIONS 1-9: 16

## 2019-12-05 ENCOUNTER — OFFICE VISIT (OUTPATIENT)
Dept: FAMILY MEDICINE | Facility: CLINIC | Age: 44
End: 2019-12-05
Payer: COMMERCIAL

## 2019-12-05 VITALS
OXYGEN SATURATION: 96 % | DIASTOLIC BLOOD PRESSURE: 82 MMHG | HEIGHT: 66 IN | SYSTOLIC BLOOD PRESSURE: 110 MMHG | TEMPERATURE: 96.3 F | BODY MASS INDEX: 41.46 KG/M2 | WEIGHT: 258 LBS | RESPIRATION RATE: 16 BRPM | HEART RATE: 82 BPM

## 2019-12-05 DIAGNOSIS — F33.1 MODERATE EPISODE OF RECURRENT MAJOR DEPRESSIVE DISORDER (H): ICD-10-CM

## 2019-12-05 DIAGNOSIS — Z00.00 ROUTINE GENERAL MEDICAL EXAMINATION AT A HEALTH CARE FACILITY: Primary | ICD-10-CM

## 2019-12-05 DIAGNOSIS — F40.243 FLYING PHOBIA: ICD-10-CM

## 2019-12-05 DIAGNOSIS — H91.93 BILATERAL HEARING LOSS, UNSPECIFIED HEARING LOSS TYPE: ICD-10-CM

## 2019-12-05 PROCEDURE — 99396 PREV VISIT EST AGE 40-64: CPT | Performed by: NURSE PRACTITIONER

## 2019-12-05 RX ORDER — BUPROPION HYDROCHLORIDE 150 MG/1
TABLET ORAL
Qty: 90 TABLET | Refills: 5 | Status: SHIPPED | OUTPATIENT
Start: 2019-12-05 | End: 2020-01-30

## 2019-12-05 RX ORDER — ALPRAZOLAM 0.5 MG
TABLET ORAL
Qty: 10 TABLET | Refills: 0 | Status: SHIPPED | OUTPATIENT
Start: 2019-12-05 | End: 2021-01-28

## 2019-12-05 ASSESSMENT — ENCOUNTER SYMPTOMS
ABDOMINAL PAIN: 1
FREQUENCY: 0
COUGH: 0
DIARRHEA: 0
HEMATURIA: 0
SORE THROAT: 0
PALPITATIONS: 0
NERVOUS/ANXIOUS: 0
NAUSEA: 0
EYE PAIN: 1
MYALGIAS: 0
CHILLS: 0
BREAST MASS: 0
PARESTHESIAS: 0
FEVER: 0
CONSTIPATION: 0
WEAKNESS: 0
HEMATOCHEZIA: 0
DYSURIA: 0
HEARTBURN: 0
JOINT SWELLING: 0
DIZZINESS: 0
SHORTNESS OF BREATH: 0
ARTHRALGIAS: 0
HEADACHES: 1

## 2019-12-05 ASSESSMENT — MIFFLIN-ST. JEOR: SCORE: 1847.03
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
## 2019-12-29 ENCOUNTER — OFFICE VISIT (OUTPATIENT)
Dept: URGENT CARE | Facility: URGENT CARE | Age: 44
End: 2019-12-29
Payer: COMMERCIAL

## 2019-12-29 VITALS
OXYGEN SATURATION: 96 % | SYSTOLIC BLOOD PRESSURE: 113 MMHG | BODY MASS INDEX: 39.37 KG/M2 | HEIGHT: 66 IN | TEMPERATURE: 98.7 F | WEIGHT: 245 LBS | DIASTOLIC BLOOD PRESSURE: 73 MMHG | HEART RATE: 87 BPM

## 2019-12-29 DIAGNOSIS — J20.9 ACUTE BRONCHITIS WITH SYMPTOMS > 10 DAYS: Primary | ICD-10-CM

## 2019-12-29 PROCEDURE — 99213 OFFICE O/P EST LOW 20 MIN: CPT | Performed by: FAMILY MEDICINE

## 2019-12-29 RX ORDER — ALBUTEROL SULFATE 90 UG/1
2 AEROSOL, METERED RESPIRATORY (INHALATION) EVERY 4 HOURS PRN
Qty: 1 INHALER | Refills: 1 | Status: SHIPPED | OUTPATIENT
Start: 2019-12-29 | End: 2021-01-28

## 2019-12-29 RX ORDER — CEPHALEXIN 500 MG/1
500 CAPSULE ORAL 3 TIMES DAILY
Qty: 30 CAPSULE | Refills: 0 | Status: SHIPPED | OUTPATIENT
Start: 2019-12-29 | End: 2020-01-30

## 2019-12-29 ASSESSMENT — MIFFLIN-ST. JEOR: SCORE: 1788.06

## 2019-12-29 NOTE — PROGRESS NOTES
SUBJECTIVE:  Leandra Chris is a 44 year old female who presents to the clinic today with a chief complaint of cough  for 2 week(s).  Her cough is described as persistent and productive of yellow sputum.    The patient's symptoms are moderate and worsening.  Associated symptoms include congestion, nasal congestion, rhinorrhea, malaise and ear pain. The patient's symptoms are exacerbated by no particular triggers  Patient has been using inhaler and OTC cough suppressants  to improve symptoms.    Past Medical History:   Diagnosis Date     Depressive disorder      LSIL (low grade squamous intraepithelial lesion) on Pap smear 2008    colp neg     Migraines      Mild major depression (H) 7/31/2018     PCOS (polycystic ovarian syndrome)      Pneumonia      Current Outpatient Medications   Medication Sig Dispense Refill     Acetaminophen (TYLENOL PO) Take by mouth every 4 hours as needed for mild pain or fever Reported on 4/4/2017       albuterol (PROAIR HFA/PROVENTIL HFA/VENTOLIN HFA) 108 (90 Base) MCG/ACT inhaler Inhale 2 puffs into the lungs every 4 hours as needed for shortness of breath / dyspnea or wheezing 1 Inhaler 1     ALPRAZolam (XANAX) 0.5 MG tablet Take 1-2 tablets 45 minutes prior to flight 10 tablet 0     buPROPion (WELLBUTRIN XL) 150 MG 24 hr tablet Take 3 tablets daily 90 tablet 5     butalbital-aspirin-caffeine (FIORINAL) -40 MG per capsule Take 1 capsule by mouth every 4 hours as needed for pain 10 capsule 1     cephALEXin (KEFLEX) 500 MG capsule Take 1 capsule (500 mg) by mouth 3 times daily for 10 days 30 capsule 0     IBUPROFEN PO Take 400 mg by mouth as needed Reported on 4/4/2017       vortioxetine (TRINTELLIX/BRINTELLIX) 5 MG tablet Take 5 mg by mouth daily       zolpidem (AMBIEN) 5 MG tablet Take 1 tablet (5 mg) by mouth nightly as needed for sleep 30 tablet 0     History   Smoking Status     Former Smoker     Packs/day: 0.50     Years: 13.00     Types: Cigarettes     Start date: 1/1/1999      "Quit date: 2/1/2012   Smokeless Tobacco     Never Used       ROS  Review of systems negative except as stated above.    OBJECTIVE:  /73   Pulse 87   Temp 98.7  F (37.1  C) (Oral)   Ht 1.676 m (5' 6\")   Wt 111.1 kg (245 lb)   SpO2 96%   BMI 39.54 kg/m    GENERAL APPEARANCE: mild distress and cooperative  EYES: EOMI,  PERRL, conjunctiva clear  HENT: ear canals and TM's normal.  Nose and mouth without ulcers, erythema or lesions  NECK: supple, nontender, no lymphadenopathy  RESP: rhonchi bilateral  CV: regular rates and rhythm, normal S1 S2, no murmur noted  ABDOMEN:  soft, nontender, no HSM or masses and bowel sounds normal  NEURO: Normal strength and tone, sensory exam grossly normal,  normal speech and mentation  SKIN: no suspicious lesions or rashes    ASSESSMENT:    Acute Bronchitis  Broncospasm    PLAN:  Refill albuterol, start Cephalexin.  Symptomatic measures encouraged, humidified air, plenty of fluids.  Follow up with primary care provider if no improvement.  "

## 2020-01-06 ENCOUNTER — MYC MEDICAL ADVICE (OUTPATIENT)
Dept: FAMILY MEDICINE | Facility: CLINIC | Age: 45
End: 2020-01-06

## 2020-01-06 DIAGNOSIS — K62.5 RECTAL BLEEDING: ICD-10-CM

## 2020-01-06 DIAGNOSIS — J20.9 ACUTE BRONCHITIS, UNSPECIFIED ORGANISM: Primary | ICD-10-CM

## 2020-01-07 RX ORDER — AZITHROMYCIN 250 MG/1
TABLET, FILM COATED ORAL
Qty: 6 TABLET | Refills: 0 | Status: SHIPPED | OUTPATIENT
Start: 2020-01-07 | End: 2020-01-30

## 2020-01-21 ASSESSMENT — ENCOUNTER SYMPTOMS
PANIC: 0
SLEEP DISTURBANCES DUE TO BREATHING: 0
HOARSE VOICE: 1
NECK MASS: 0
ALTERED TEMPERATURE REGULATION: 1
FATIGUE: 1
HYPERTENSION: 0
TROUBLE SWALLOWING: 0
BLOATING: 0
COUGH: 1
POSTURAL DYSPNEA: 1
COUGH DISTURBING SLEEP: 1
SMELL DISTURBANCE: 0
SHORTNESS OF BREATH: 1
SORE THROAT: 0
HEARTBURN: 0
DECREASED APPETITE: 0
JAUNDICE: 0
VOMITING: 0
POLYPHAGIA: 0
BOWEL INCONTINENCE: 0
SINUS CONGESTION: 0
SNORES LOUDLY: 0
NAUSEA: 0
WEIGHT GAIN: 0
WHEEZING: 0
SINUS PAIN: 0
NIGHT SWEATS: 0
TASTE DISTURBANCE: 0
HYPOTENSION: 0
HEMOPTYSIS: 0
LIGHT-HEADEDNESS: 0
NERVOUS/ANXIOUS: 1
LEG PAIN: 0
DYSPNEA ON EXERTION: 1
ORTHOPNEA: 1
EXERCISE INTOLERANCE: 1
HALLUCINATIONS: 0
BLOOD IN STOOL: 1
ABDOMINAL PAIN: 0
SPUTUM PRODUCTION: 1
INSOMNIA: 1
CONSTIPATION: 1
FEVER: 0
DEPRESSION: 1
PALPITATIONS: 0
WEIGHT LOSS: 0
SYNCOPE: 0
POLYDIPSIA: 0
DECREASED CONCENTRATION: 1
RECTAL PAIN: 1
CHILLS: 0
INCREASED ENERGY: 0

## 2020-01-22 ENCOUNTER — OFFICE VISIT (OUTPATIENT)
Dept: AUDIOLOGY | Facility: CLINIC | Age: 45
End: 2020-01-22
Attending: NURSE PRACTITIONER
Payer: COMMERCIAL

## 2020-01-22 DIAGNOSIS — H90.41 SENSORINEURAL HEARING LOSS (SNHL) OF RIGHT EAR WITH UNRESTRICTED HEARING OF LEFT EAR: Primary | ICD-10-CM

## 2020-01-22 NOTE — PROGRESS NOTES
AUDIOLOGY REPORT    SUBJECTIVE:  Leandra Chris is a 45 year old female who was seen in the Audiology Clinic at the [facility] for audiologic evaluation, referred by Nelli Nye M.D. The patient reports that her spouse is concerned about her hearing status and that she did not pass a hearing screening at a recent primary care visit. The patient denies bilateral tinnitus, bilateral otalgia, bilateral drainage, bilateral aural fullness, and dizziness.  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  The patient notes difficulty with communication in a variety of listening situations.      OBJECTIVE:  Abuse Screening:  Do you feel unsafe at home or work/school? No  Do you feel threatened by someone? No  Does anyone try to keep you from having contact with others, or doing things outside of your home? No  Physical signs of abuse present? No     Fall Risk Screen:  1. Have you fallen two or more times in the past year? No  2. Have you fallen and had an injury in the past year? No    Timed Up and Go Score (in seconds): not tested  Is patient a fall risk? No  Referral initiated: No  Fall Risk Assessment Completed by Audiology    Otoscopic exam indicates ears are clear of cerumen bilaterally     Pure Tone Thresholds assessed using conventional audiometry with good  reliability from 250-8000 Hz bilaterally using insert earphones and circumaural headphones     RIGHT:  Mild essentially sensorineural hearing loss rising to normal hearing     LEFT:    Normal hearing at all frequencies tested    Tympanogram:    RIGHT: normal eardrum mobility    LEFT:   normal eardrum mobility    Reflexes (reported by stimulus ear):  RIGHT: Ipsilateral is present at normal levels  RIGHT: Contralateral is present at normal levels  LEFT:   Ipsilateral is present at normal levels  LEFT:   Contralateral is present at normal levels    Speech Reception Threshold:     RIGHT: 25 dB HL    LEFT:   15 dB HL    Word Recognition Score:     RIGHT: 96% at 60 dB HL using NU-6 recorded word list.    LEFT:   100% at 55 dB HL using NU-6 recorded word list.      ASSESSMENT:   Today's results revealed a unilateral (right) hearing loss. Today s results were discussed with the patient in detail.     PLAN:  Patient was counseled regarding hearing loss and impact on communication. Patient is not an ideal candidate for amplification at this time.  Handout on good communication strategies was given to patient. It is recommended that the patient follow-up with ENT regarding the unilateral hearing loss, she declined to set up that appointment today. It is recommended that she monitor her hearing status every 1-2 years, sooner if concerns arise.  Please call this clinic with questions regarding these results or recommendations.        Daniel Massey  Audiologist  [place] License  #[id number]

## 2020-01-24 ENCOUNTER — OFFICE VISIT (OUTPATIENT)
Dept: SURGERY | Facility: CLINIC | Age: 45
End: 2020-01-24
Attending: NURSE PRACTITIONER
Payer: COMMERCIAL

## 2020-01-24 ENCOUNTER — PRE VISIT (OUTPATIENT)
Dept: SURGERY | Facility: CLINIC | Age: 45
End: 2020-01-24

## 2020-01-24 VITALS
OXYGEN SATURATION: 96 % | WEIGHT: 258.5 LBS | TEMPERATURE: 98.3 F | DIASTOLIC BLOOD PRESSURE: 84 MMHG | HEIGHT: 66 IN | BODY MASS INDEX: 41.54 KG/M2 | SYSTOLIC BLOOD PRESSURE: 137 MMHG | HEART RATE: 75 BPM

## 2020-01-24 DIAGNOSIS — K60.2 ANAL FISSURE: Primary | ICD-10-CM

## 2020-01-24 DIAGNOSIS — K64.4 EXTERNAL HEMORRHOIDS: ICD-10-CM

## 2020-01-24 ASSESSMENT — PAIN SCALES - GENERAL: PAINLEVEL: MILD PAIN (3)

## 2020-01-24 ASSESSMENT — MIFFLIN-ST. JEOR: SCORE: 1844.3

## 2020-01-24 NOTE — PATIENT INSTRUCTIONS
Follow up as needed.    Start a daily fiber supplement such as Citrucel or Metamucil. Start with once a day and slowly increase up to three times a day, if needed, over the next 4-6 weeks    Please call with any questions or concerns regarding your clinic visit today.    It is a pleasure being involved in your health care.    Contacts post-consultation depending on your need:    Radiology Appointments [phone]    Schedule Clinic Appointments [phone] # 1   M-F 7:30 - 5 pm    [facility] Blake [phone]    Clinic Fax Number [phone]    Surgery Scheduling [phone]    My Chart is available 24 hours a day and is a secure way to access your records and communicate with your care team.  I strongly recommend signing up if you haven't already done so, if you are comfortable with computers.  If you would like to inquire about this or are having problems with My Chart access, you may call [phone] or go online at [email or web address].  Please allow at least 24 hours for a response and extra time on weekends and Holidays.

## 2020-01-24 NOTE — PROGRESS NOTES
Colon and Rectal Surgery Clinic Note    RE: Leandra Chris.  : 1975.  CIRILO: 2020.    Reason for visit: Rectal pain and bleeding.    HPI: 45F with one week of perirectal pain and bleeding. Began suddenly, no clear inciting factor. Noted sensation of a painful bulge as well as sharp pain with bowel movements. Noted bleeding with bowel movements that has now slowed and mostly stopped. Still having a bit of clear drainage. Pain is mostly improved too. History of similar issues 3 years ago and saw Maday Riley NP with concern for possible fissure or fistula. MRI obtained at that time without evidence of fistula or abscess. Symptoms then resolved on their own and were good until this week. She denies fever or chills. BMs are slightly more frequent and smaller, feels that she has to reposition at times to pass stool. No blood in the stool. Has never had a colonoscopy. No family history.    Medical history:  Past Medical History:   Diagnosis Date     Depressive disorder      LSIL (low grade squamous intraepithelial lesion) on Pap smear     colp neg     Migraines      Mild major depression (H) 2018     PCOS (polycystic ovarian syndrome)      Pneumonia        Surgical history:  Past Surgical History:   Procedure Laterality Date     NO HISTORY OF SURGERY         Family history:  Family History   Problem Relation Age of Onset     Prostate Cancer Father      Hypertension Maternal Grandmother      Cerebrovascular Disease Maternal Grandmother      Substance Abuse Brother      Asthma No family hx of      C.A.D. No family hx of      Diabetes No family hx of      Breast Cancer No family hx of      Cancer - colorectal No family hx of        Medications:  Current Outpatient Medications   Medication Sig Dispense Refill     Acetaminophen (TYLENOL PO) Take by mouth every 4 hours as needed for mild pain or fever Reported on 2017       albuterol (PROAIR HFA/PROVENTIL HFA/VENTOLIN HFA) 108 (90 Base)  "MCG/ACT inhaler Inhale 2 puffs into the lungs every 4 hours as needed for shortness of breath / dyspnea or wheezing 1 Inhaler 1     ALPRAZolam (XANAX) 0.5 MG tablet Take 1-2 tablets 45 minutes prior to flight 10 tablet 0     azithromycin (ZITHROMAX) 250 MG tablet two tablets first day and 1 tablet daily for 4 days 6 tablet 0     buPROPion (WELLBUTRIN XL) 150 MG 24 hr tablet Take 3 tablets daily 90 tablet 5     butalbital-aspirin-caffeine (FIORINAL) -40 MG per capsule Take 1 capsule by mouth every 4 hours as needed for pain 10 capsule 1     IBUPROFEN PO Take 400 mg by mouth as needed Reported on 2017       vortioxetine (TRINTELLIX/BRINTELLIX) 5 MG tablet Take 10 mg by mouth daily        zolpidem (AMBIEN) 5 MG tablet Take 1 tablet (5 mg) by mouth nightly as needed for sleep 30 tablet 0       Allergies:  Allergies   Allergen Reactions     Penicillin [Penicillins]      Trazodone      Other reaction(s): Headache  Pt reports after taking trazodone she had a migraine requiring IM medication     Shellfish-Derived Products Rash       Social history:   Social History     Tobacco Use     Smoking status: Former Smoker     Packs/day: 0.50     Years: 13.00     Pack years: 6.50     Types: Cigarettes     Start date: 1999     Last attempt to quit: 2012     Years since quittin.9     Smokeless tobacco: Never Used   Substance Use Topics     Alcohol use: Yes     Comment: rarely     Marital status: .    ROS:  A complete review of systems was performed with the patient and all systems negative except as per HPI.    Physical Examination:  Exam was chaperoned by Hayden Lee LPN  /84 (BP Location: Left arm, Patient Position: Sitting, Cuff Size: Adult Regular)   Pulse 75   Temp 98.3  F (36.8  C) (Oral)   Ht 1.676 m (5' 6\")   Wt 117.3 kg (258 lb 8 oz)   SpO2 96%   BMI 41.72 kg/m    General: Well hydrated. No acute distress.  Abdomen: Soft, NT.  Perianal external examination:  Perianal skin: Intact with " no excoriation or lichenification.  Lesions: No evidence of an external lesion, nodularity, or induration in the perianal region.  Eversion of buttocks: There was evidence of an anal fissure. Details: anterior midline chronic appearing fissure with small sentinel tag.  Skin tags or external hemorrhoids: Left lateral external hemorrhoid mildly enlarged, soft  Digital rectal examination: Was performed.   Sphincter tone: Good.  Palpable lesions: No.  Other: Anterior midline fissure.    Anoscopy: Was performed.   Hemorrhoids: No significant internal hemorrhoids.  Lesions: anterior midline fissure noted    Laboratory values reviewed:  Recent Labs   Lab Test 01/16/19 1918   WBC 10.5   HGB 13.9      CR 0.67   ALBUMIN 3.8   BILITOTAL 0.3   ALKPHOS 100   ALT 30   AST 12     ASSESSMENT  45F with 1 week of perianal pain and bleeding, now improving. Mildly enlarged external hemorrhoid and anterior midline fissure on exam. No evidence of fistula.    PLAN  1. Conservative management as symptoms are improving. Discussed adding fiber supplement or increasing dietary fiber. Add miralax as needed to keep bowel movements soft. Discussed hydration  2. No indication for colonoscopy at this time  3. Return to clinic as needed or if symptoms do not improve    Time spent: 30 minutes.  >50% spent in discussing, counseling and coordinating care.    Kareem Wadsworth M.D    Division of Colon and Rectal Surgery  [facility]    Referring Provider:  Nelli Nye NP  [address] [place] [address]     Primary Care Provider:  Nelli Nye

## 2020-01-24 NOTE — LETTER
2020       RE: Leandra Chris  [address]  [place] [address]     Dear Colleague,    Thank you for referring your patient, Leandra Chris, to the [facility] COLON AND RECTAL SURGERY at [facility]. Please see a copy of my visit note below.    Colon and Rectal Surgery Clinic Note    RE: Leandra Chris.  : 1975.  CIRILO: 2020.    Reason for visit: Rectal pain and bleeding.    HPI: 45F with one week of perirectal pain and bleeding. Began suddenly, no clear inciting factor. Noted sensation of a painful bulge as well as sharp pain with bowel movements. Noted bleeding with bowel movements that has now slowed and mostly stopped. Still having a bit of clear drainage. Pain is mostly improved too. History of similar issues 3 years ago and saw Maday Riley NP with concern for possible fissure or fistula. MRI obtained at that time without evidence of fistula or abscess. Symptoms then resolved on their own and were good until this week. She denies fever or chills. BMs are slightly more frequent and smaller, feels that she has to reposition at times to pass stool. No blood in the stool. Has never had a colonoscopy. No family history.    Medical history:  Past Medical History:   Diagnosis Date     Depressive disorder      LSIL (low grade squamous intraepithelial lesion) on Pap smear     colp neg     Migraines      Mild major depression (H) 2018     PCOS (polycystic ovarian syndrome)      Pneumonia        Surgical history:  Past Surgical History:   Procedure Laterality Date     NO HISTORY OF SURGERY         Family history:  Family History   Problem Relation Age of Onset     Prostate Cancer Father      Hypertension Maternal Grandmother      Cerebrovascular Disease Maternal Grandmother      Substance Abuse Brother      Asthma No family hx of      C.A.D. No family hx of      Diabetes No family hx of      Breast Cancer No family hx of      Cancer -  colorectal No family hx of        Medications:  Current Outpatient Medications   Medication Sig Dispense Refill     Acetaminophen (TYLENOL PO) Take by mouth every 4 hours as needed for mild pain or fever Reported on 2017       albuterol (PROAIR HFA/PROVENTIL HFA/VENTOLIN HFA) 108 (90 Base) MCG/ACT inhaler Inhale 2 puffs into the lungs every 4 hours as needed for shortness of breath / dyspnea or wheezing 1 Inhaler 1     ALPRAZolam (XANAX) 0.5 MG tablet Take 1-2 tablets 45 minutes prior to flight 10 tablet 0     azithromycin (ZITHROMAX) 250 MG tablet two tablets first day and 1 tablet daily for 4 days 6 tablet 0     buPROPion (WELLBUTRIN XL) 150 MG 24 hr tablet Take 3 tablets daily 90 tablet 5     butalbital-aspirin-caffeine (FIORINAL) -40 MG per capsule Take 1 capsule by mouth every 4 hours as needed for pain 10 capsule 1     IBUPROFEN PO Take 400 mg by mouth as needed Reported on 2017       vortioxetine (TRINTELLIX/BRINTELLIX) 5 MG tablet Take 10 mg by mouth daily        zolpidem (AMBIEN) 5 MG tablet Take 1 tablet (5 mg) by mouth nightly as needed for sleep 30 tablet 0       Allergies:  Allergies   Allergen Reactions     Penicillin [Penicillins]      Trazodone      Other reaction(s): Headache  Pt reports after taking trazodone she had a migraine requiring IM medication     Shellfish-Derived Products Rash       Social history:   Social History     Tobacco Use     Smoking status: Former Smoker     Packs/day: 0.50     Years: 13.00     Pack years: 6.50     Types: Cigarettes     Start date: 1999     Last attempt to quit: 2012     Years since quittin.9     Smokeless tobacco: Never Used   Substance Use Topics     Alcohol use: Yes     Comment: rarely     Marital status: .    ROS:  A complete review of systems was performed with the patient and all systems negative except as per HPI.    Physical Examination:  Exam was chaperoned by Hayden Lee LPN  /84 (BP Location: Left arm, Patient  "Position: Sitting, Cuff Size: Adult Regular)   Pulse 75   Temp 98.3  F (36.8  C) (Oral)   Ht 1.676 m (5' 6\")   Wt 117.3 kg (258 lb 8 oz)   SpO2 96%   BMI 41.72 kg/m     General: Well hydrated. No acute distress.  Abdomen: Soft, NT.  Perianal external examination:  Perianal skin: Intact with no excoriation or lichenification.  Lesions: No evidence of an external lesion, nodularity, or induration in the perianal region.  Eversion of buttocks: There was evidence of an anal fissure. Details: anterior midline chronic appearing fissure with small sentinel tag.  Skin tags or external hemorrhoids: Left lateral external hemorrhoid mildly enlarged, soft  Digital rectal examination: Was performed.   Sphincter tone: Good.  Palpable lesions: No.  Other: Anterior midline fissure.    Anoscopy: Was performed.   Hemorrhoids: No significant internal hemorrhoids.  Lesions: anterior midline fissure noted    Laboratory values reviewed:  Recent Labs   Lab Test 01/16/19  1918   WBC 10.5   HGB 13.9      CR 0.67   ALBUMIN 3.8   BILITOTAL 0.3   ALKPHOS 100   ALT 30   AST 12     ASSESSMENT  45F with 1 week of perianal pain and bleeding, now improving. Mildly enlarged external hemorrhoid and anterior midline fissure on exam. No evidence of fistula.    PLAN  1. Conservative management as symptoms are improving. Discussed adding fiber supplement or increasing dietary fiber. Add miralax as needed to keep bowel movements soft. Discussed hydration  2. No indication for colonoscopy at this time  3. Return to clinic as needed or if symptoms do not improve    Time spent: 30 minutes.  >50% spent in discussing, counseling and coordinating care.    Kareem Wadsworth M.D    Division of Colon and Rectal Surgery  [facility]    Referring Provider:  Nelli Nye NP  [address] [place] [address]     Primary Care Provider:  Nelli Nye"

## 2020-01-30 ENCOUNTER — OFFICE VISIT (OUTPATIENT)
Dept: FAMILY MEDICINE | Facility: CLINIC | Age: 45
End: 2020-01-30
Payer: COMMERCIAL

## 2020-01-30 VITALS
SYSTOLIC BLOOD PRESSURE: 112 MMHG | WEIGHT: 259.4 LBS | OXYGEN SATURATION: 97 % | BODY MASS INDEX: 40.71 KG/M2 | DIASTOLIC BLOOD PRESSURE: 78 MMHG | HEART RATE: 79 BPM | TEMPERATURE: 98.4 F | HEIGHT: 67 IN

## 2020-01-30 DIAGNOSIS — F32.1 MODERATE MAJOR DEPRESSION (H): Primary | ICD-10-CM

## 2020-01-30 DIAGNOSIS — R05.9 COUGH: ICD-10-CM

## 2020-01-30 DIAGNOSIS — E66.01 MORBID OBESITY WITH BMI OF 40.0-44.9, ADULT (H): ICD-10-CM

## 2020-01-30 PROCEDURE — 99214 OFFICE O/P EST MOD 30 MIN: CPT | Performed by: NURSE PRACTITIONER

## 2020-01-30 RX ORDER — BUPROPION HYDROCHLORIDE 150 MG/1
TABLET ORAL
Qty: 90 TABLET | Refills: 5 | Status: SHIPPED | OUTPATIENT
Start: 2020-01-30 | End: 2020-05-28

## 2020-01-30 ASSESSMENT — MIFFLIN-ST. JEOR: SCORE: 1856.32

## 2020-01-30 NOTE — PROGRESS NOTES
"SUBJECTIVE:  Leandra Chris, a 45 year old female scheduled an appointment to discuss the following issues:     Moderate major depression (H)  She has completed IOP, is seeing a therapist, grief group.  She has been feeling that her depression is well-controlled on Wellbutrin and Trintellix until this week, triggers with her dad's birthday, friend's mother recently .    Cough  She was treated for bronchitis in December with ZPak.  Has been using an albuterol inhaler.  Symptoms initially started with a cold.  Cough is non-productive, no fevers.  Occasional wheezing when she is outside or at night.    Morbid obesity with BMI of 40.0-44.9, adult (H)    Medical, social, surgical, and family histories reviewed.    ROS:  CONSTITUTIONAL: NEGATIVE for fever, chills  EYES: NEGATIVE for vision changes   RESP:as above  CV: NEGATIVE for chest pain, palpitations   GI: NEGATIVE for nausea, abdominal pain, heartburn, or change in bowel habits  MUSCULOSKELETAL: NEGATIVE for significant arthralgias or myalgia  NEURO: NEGATIVE for weakness, dizziness or paresthesias or headache  PSYCHIATRIC: see HPI    OBJECTIVE:  /78   Pulse 79   Temp 98.4  F (36.9  C) (Oral)   Ht 1.689 m (5' 6.5\")   Wt 117.7 kg (259 lb 6.4 oz)   SpO2 97%   BMI 41.24 kg/m    EXAM:  GENERAL APPEARANCE: healthy, alert and no distress  HENT: ear canals and TM's normal and nose and mouth without ulcers or lesions  RESP: lungs clear to auscultation - no rales, rhonchi or wheezes  CV: regular rates and rhythm, normal S1 S2, no S3 or S4 and no murmur, click or rub -  ABDOMEN:  soft, nontender, no HSM or masses and bowel sounds normal  PSYCH: mentation appears normal and affect normal    ASSESSMENT/PLAN:  (F32.1) Moderate major depression (H)  (primary encounter diagnosis)  Comment:   Plan: vortioxetine (TRINTELLIX/BRINTELLIX) 10 MG         tablet, buPROPion (WELLBUTRIN XL) 150 MG 24 hr         tablet        Will continue on current medications.  She will " contact me if very recent worsening of depression does not improve in the next 1-2 weeks.  Follow up in 3 months.     (R05) Cough  Comment:   Plan: beclomethasone HFA (QVAR REDIHALER) 40 MCG/ACT         inhaler        Will have her start ICS, continue with albuterol PRN.  Follow up if no improvement in the next few weeks or sooner if symptoms worsen.     (E66.01,  Z68.41) BMI of 40.0-44.9, adult (H)  Comment:   Plan: Discussed diet, exercise.

## 2020-02-19 DIAGNOSIS — H90.5 UNILATERAL SENSORINEURAL HEARING LOSS: Primary | ICD-10-CM

## 2020-02-20 NOTE — PROGRESS NOTES
Do you want her to see ENT for this?    ----- Message -----   From: Maday Triplett   Sent: 2/19/2020   9:01 AM CST   To:  Grand Triage   Subject: FW: Appointment scheduled from [facility]             Does patient need an appointment with isai or just a referral?    ----- Message -----   From: Leandra Chris   Sent: 2/18/2020   5:11 PM CST   To:  Reception   Subject: Appointment scheduled from [facility]                 Appointment For: Leandra Chris ([id number])   Visit Type: [facility] OFFICE VISIT SHORT (910)      3/5/2020     4:15 PM  15 mins.  Nelli Nye, NP    [facility] FAMILY PRAC/IMPEDS      Patient Comments:   Not sure if I should make an appt here or go to an ENT for both, but when u went to the audiologist, she said I should see an ENT (and needed a referral from you) because my results were unusual. Now I have also noticed that my sense of smell is gone. Let me know what you think. Thanks!        ENT referral entered.  Please let pt know.

## 2020-02-20 NOTE — TELEPHONE ENCOUNTER
FUTURE VISIT INFORMATION      FUTURE VISIT INFORMATION:    Date: 3/12/2020    Time: 3PM    Location: [facility]  REFERRAL INFORMATION:    Referring provider:  Nelli Nye NP    Referring providers clinic:  [facility]     Reason for visit/diagnosis  Unilateral sensorineural hearing loss     RECORDS REQUESTED FROM:       Clinic name Comments Records Status Imaging Status   [facility]  2/19/2020, 12/5/19 notes with Nelli Malagon    [facility] Audiology  1/22/2020 Audiogram with Nery Sanchez EPIC

## 2020-03-12 ENCOUNTER — OFFICE VISIT (OUTPATIENT)
Dept: OTOLARYNGOLOGY | Facility: CLINIC | Age: 45
End: 2020-03-12
Attending: NURSE PRACTITIONER
Payer: COMMERCIAL

## 2020-03-12 ENCOUNTER — PRE VISIT (OUTPATIENT)
Dept: OTOLARYNGOLOGY | Facility: CLINIC | Age: 45
End: 2020-03-12

## 2020-03-12 DIAGNOSIS — H90.3 ASNHL (ASYMMETRICAL SENSORINEURAL HEARING LOSS): ICD-10-CM

## 2020-03-12 DIAGNOSIS — R43.8 IMPAIRED SENSE OF SMELL: ICD-10-CM

## 2020-03-12 DIAGNOSIS — H90.3 ASNHL (ASYMMETRICAL SENSORINEURAL HEARING LOSS): Primary | ICD-10-CM

## 2020-03-12 LAB
BUN SERPL-MCNC: 11 MG/DL (ref 7–30)
CREAT SERPL-MCNC: 0.67 MG/DL (ref 0.52–1.04)
GFR SERPL CREATININE-BSD FRML MDRD: >90 ML/MIN/{1.73_M2}

## 2020-03-12 ASSESSMENT — PATIENT HEALTH QUESTIONNAIRE - PHQ9: SUM OF ALL RESPONSES TO PHQ QUESTIONS 1-9: 8

## 2020-03-12 ASSESSMENT — PAIN SCALES - GENERAL: PAINLEVEL: NO PAIN (0)

## 2020-03-12 NOTE — NURSING NOTE
Chief Complaint   Patient presents with     Consult     hearing loss right ear        Wong Peacock LPN

## 2020-03-12 NOTE — PATIENT INSTRUCTIONS
1.  You were seen in the ENT Clinic today by Dr. Rincon.  If you have any questions or concerns after your appointment, please call [phone]. Press option #1 for scheduling related needs. Press option #3 for Nurse advice.    2.  Please schedule an appointment for the following:   -Labs: BUN & Creatinine   -MRI Head Temporal Bones/IAC    -MRI Brain   3.  Plan is to return to clinic to follow-up with Dr. Rincon after completion of these scans      Kelsi Xiao LPN  [facility] Otolaryngology  [phone]        The patient presents with a history of asymmetric hearing loss. She also has diminished sense of smell. She will be referred for an MRI scan of the head and she will be seen again after this testing is completed.

## 2020-03-12 NOTE — PROGRESS NOTES
The patient presents with a history of hearing loss. The patient reports also having a decreased sense of small, but this has improved recently.  The patient reports a progressive hearing loss in both ears over at least the past few years.  The patient denies ear infections, otalgia, otorrhea, dizziness, or tinnitus. She reports a family history of sensorineural hearing loss at a younger age. The patient denies sinusitis, rhinitis, facial pain, nasal obstruction or purulent nasal discharge. The patient denies chronic or recurrent tonsillitis, chronic or recurrent pharyngitis. The patient's Audiogram and Tympanogram demonstrates an asymmetric sensorineural hearing loss in the lower frequencies worse in the right ear. The word recognition scores are 96% on the right and 100% on the left with normal tympanograms.     This patient is seen in consultation at the request of Dr. Nelli Nye.     All other systems were reviewed and they are either negative or they are not directly pertinent to this Otolaryngology examination.     Past Medical History:    Past Medical History:   Diagnosis Date     Conductive hearing loss 1/30/20    Not sure which kind of hearing loss     Depressive disorder      Hearing problem 11/19     Hoarseness 12/15/19    Not all the time     LSIL (low grade squamous intraepithelial lesion) on Pap smear 2008    colp neg     Migraines      Mild major depression (H) 7/31/2018     Obstructive sleep apnea      PCOS (polycystic ovarian syndrome)      Pneumonia      Sensorineural hearing loss 1/30/20    Not sure which kind of hearing loss     Sleep apnea 1/2012    Use cpap machine       Past Surgical History:    Past Surgical History:   Procedure Laterality Date     NO HISTORY OF SURGERY         Medications:      Current Outpatient Medications:      Acetaminophen (TYLENOL PO), Take by mouth every 4 hours as needed for mild pain or fever Reported on 4/4/2017, Disp: , Rfl:      albuterol (PROAIR  HFA/PROVENTIL HFA/VENTOLIN HFA) 108 (90 Base) MCG/ACT inhaler, Inhale 2 puffs into the lungs every 4 hours as needed for shortness of breath / dyspnea or wheezing, Disp: 1 Inhaler, Rfl: 1     ALPRAZolam (XANAX) 0.5 MG tablet, Take 1-2 tablets 45 minutes prior to flight, Disp: 10 tablet, Rfl: 0     beclomethasone HFA (QVAR REDIHALER) 40 MCG/ACT inhaler, Inhale 1 puff into the lungs 2 times daily, Disp: 1 Inhaler, Rfl: 0     buPROPion (WELLBUTRIN XL) 150 MG 24 hr tablet, Take 3 tablets daily, Disp: 90 tablet, Rfl: 5     butalbital-aspirin-caffeine (FIORINAL) -40 MG per capsule, Take 1 capsule by mouth every 4 hours as needed for pain, Disp: 10 capsule, Rfl: 1     IBUPROFEN PO, Take 400 mg by mouth as needed Reported on 4/4/2017, Disp: , Rfl:      vortioxetine (TRINTELLIX/BRINTELLIX) 10 MG tablet, Take 1 tablet (10 mg) by mouth daily, Disp: 30 tablet, Rfl: 5     zolpidem (AMBIEN) 5 MG tablet, Take 1 tablet (5 mg) by mouth nightly as needed for sleep, Disp: 30 tablet, Rfl: 0    Allergies:    Penicillin [penicillins]; Trazodone; and Shellfish-derived products    Physical Examination:    The patient is a well developed, well nourished female in no apparent distress.  She is normocephalic, atraumatic with pupils equally round and reactive to light.    Oral Cavity Examination:  Normal mucosa with no masses or lesions  Nasal Examination: Normal mucosa with no masses or lesions  Ear Examination: Ear canals clear, tympanic membranes and middle ear spaces normal  Neurological Examination: Facial nerve function intact and symmetric  Integumentary Examination: No lesions on the skin of the head and neck  Neck Examination: No masses or lesions, no lymphadenopathy  Endocrine Examination: Normal thyroid examination    Assessment and Plan:    The patient presents with a history of asymmetric hearing loss. She also has diminished sense of smell. She will be referred for an MRI scan of the head and she will be seen again after  this testing is completed.      CC: Dr. Nelli Nye

## 2020-03-12 NOTE — LETTER
3/12/2020       RE: Leandra Chris  [address]  [place] [address]     Dear Colleague,    Thank you for referring your patient, Leandra Chris, to the [facility] EAR NOSE AND THROAT at [facility]. Please see a copy of my visit note below.    The patient presents with a history of hearing loss. The patient reports also having a decreased sense of small, but this has improved recently.  The patient reports a progressive hearing loss in both ears over at least the past few years.  The patient denies ear infections, otalgia, otorrhea, dizziness, or tinnitus. She reports a family history of sensorineural hearing loss at a younger age. The patient denies sinusitis, rhinitis, facial pain, nasal obstruction or purulent nasal discharge. The patient denies chronic or recurrent tonsillitis, chronic or recurrent pharyngitis. The patient's Audiogram and Tympanogram demonstrates an asymmetric sensorineural hearing loss in the lower frequencies worse in the right ear. The word recognition scores are 96% on the right and 100% on the left with normal tympanograms.     This patient is seen in consultation at the request of Dr. Nelli Nye.     All other systems were reviewed and they are either negative or they are not directly pertinent to this Otolaryngology examination.     Past Medical History:    Past Medical History:   Diagnosis Date     Conductive hearing loss 1/30/20    Not sure which kind of hearing loss     Depressive disorder      Hearing problem 11/19     Hoarseness 12/15/19    Not all the time     LSIL (low grade squamous intraepithelial lesion) on Pap smear 2008    colp neg     Migraines      Mild major depression (H) 7/31/2018     Obstructive sleep apnea      PCOS (polycystic ovarian syndrome)      Pneumonia      Sensorineural hearing loss 1/30/20    Not sure which kind of hearing loss     Sleep apnea 1/2012    Use cpap machine       Past Surgical History:    Past  Surgical History:   Procedure Laterality Date     NO HISTORY OF SURGERY         Medications:      Current Outpatient Medications:      Acetaminophen (TYLENOL PO), Take by mouth every 4 hours as needed for mild pain or fever Reported on 4/4/2017, Disp: , Rfl:      albuterol (PROAIR HFA/PROVENTIL HFA/VENTOLIN HFA) 108 (90 Base) MCG/ACT inhaler, Inhale 2 puffs into the lungs every 4 hours as needed for shortness of breath / dyspnea or wheezing, Disp: 1 Inhaler, Rfl: 1     ALPRAZolam (XANAX) 0.5 MG tablet, Take 1-2 tablets 45 minutes prior to flight, Disp: 10 tablet, Rfl: 0     beclomethasone HFA (QVAR REDIHALER) 40 MCG/ACT inhaler, Inhale 1 puff into the lungs 2 times daily, Disp: 1 Inhaler, Rfl: 0     buPROPion (WELLBUTRIN XL) 150 MG 24 hr tablet, Take 3 tablets daily, Disp: 90 tablet, Rfl: 5     butalbital-aspirin-caffeine (FIORINAL) -40 MG per capsule, Take 1 capsule by mouth every 4 hours as needed for pain, Disp: 10 capsule, Rfl: 1     IBUPROFEN PO, Take 400 mg by mouth as needed Reported on 4/4/2017, Disp: , Rfl:      vortioxetine (TRINTELLIX/BRINTELLIX) 10 MG tablet, Take 1 tablet (10 mg) by mouth daily, Disp: 30 tablet, Rfl: 5     zolpidem (AMBIEN) 5 MG tablet, Take 1 tablet (5 mg) by mouth nightly as needed for sleep, Disp: 30 tablet, Rfl: 0    Allergies:    Penicillin [penicillins]; Trazodone; and Shellfish-derived products    Physical Examination:    The patient is a well developed, well nourished female in no apparent distress.  She is normocephalic, atraumatic with pupils equally round and reactive to light.    Oral Cavity Examination:  Normal mucosa with no masses or lesions  Nasal Examination: Normal mucosa with no masses or lesions  Ear Examination: Ear canals clear, tympanic membranes and middle ear spaces normal  Neurological Examination: Facial nerve function intact and symmetric  Integumentary Examination: No lesions on the skin of the head and neck  Neck Examination: No masses or lesions, no  lymphadenopathy  Endocrine Examination: Normal thyroid examination    Assessment and Plan:    The patient presents with a history of asymmetric hearing loss. She also has diminished sense of smell. She will be referred for an MRI scan of the head and she will be seen again after this testing is completed.      CC: Dr. Nelli Nye    Again, thank you for allowing me to participate in the care of your patient.      Sincerely,    Elio Rincon MD

## 2020-03-13 ENCOUNTER — ANCILLARY PROCEDURE (OUTPATIENT)
Dept: MRI IMAGING | Facility: CLINIC | Age: 45
End: 2020-03-13
Attending: OTOLARYNGOLOGY
Payer: COMMERCIAL

## 2020-03-13 DIAGNOSIS — R43.8 IMPAIRED SENSE OF SMELL: ICD-10-CM

## 2020-03-13 DIAGNOSIS — H90.3 ASNHL (ASYMMETRICAL SENSORINEURAL HEARING LOSS): ICD-10-CM

## 2020-03-13 RX ORDER — GADOBUTROL 604.72 MG/ML
10 INJECTION INTRAVENOUS ONCE
Status: COMPLETED | OUTPATIENT
Start: 2020-03-13 | End: 2020-03-13

## 2020-03-13 RX ADMIN — GADOBUTROL 10 ML: 604.72 INJECTION INTRAVENOUS at 15:04

## 2020-03-13 NOTE — DISCHARGE INSTRUCTIONS
MRI Contrast Discharge Instructions    The IV contrast you received today will pass out of your body in your  urine. This will happen in the next 24 hours. You will not feel this process.  Your urine will not change color.    Drink at least 4 extra glasses of water or juice today (unless your doctor  has restricted your fluids). This reduces the stress on your kidneys.  You may take your regular medicines.    If you are on dialysis: It is best to have dialysis today.    If you have a reaction: Most reactions happen right away. If you have  any new symptoms after leaving the hospital (such as hives or swelling),  call your hospital at the correct number below. Or call your family doctor.  If you have breathing distress or wheezing, call 911.    Special instructions: ***    I have read and understand the above information.    Signature:______________________________________ Date:___________    Staff:__________________________________________ Date:___________     Time:__________    [facility] Departments:    ___[place]: [phone]  ___[facility]: [phone]  ___[place]: [phone] ___[facility]: [phone]  ___[facility]: [phone]  ___[facility]: [phone]  ___[phone]  ___[facility]: [phone]  ___[phone]

## 2020-05-21 ENCOUNTER — E-VISIT (OUTPATIENT)
Dept: FAMILY MEDICINE | Facility: CLINIC | Age: 45
End: 2020-05-21
Payer: COMMERCIAL

## 2020-05-21 DIAGNOSIS — G47.00 INSOMNIA, UNSPECIFIED TYPE: ICD-10-CM

## 2020-05-21 PROCEDURE — 99207 ZZC NON-BILLABLE SERV PER CHARTING: CPT | Performed by: NURSE PRACTITIONER

## 2020-05-21 ASSESSMENT — PATIENT HEALTH QUESTIONNAIRE - PHQ9
SUM OF ALL RESPONSES TO PHQ QUESTIONS 1-9: 13
SUM OF ALL RESPONSES TO PHQ QUESTIONS 1-9: 13
10. IF YOU CHECKED OFF ANY PROBLEMS, HOW DIFFICULT HAVE THESE PROBLEMS MADE IT FOR YOU TO DO YOUR WORK, TAKE CARE OF THINGS AT HOME, OR GET ALONG WITH OTHER PEOPLE: SOMEWHAT DIFFICULT

## 2020-05-22 ASSESSMENT — PATIENT HEALTH QUESTIONNAIRE - PHQ9: SUM OF ALL RESPONSES TO PHQ QUESTIONS 1-9: 13

## 2020-05-26 RX ORDER — ZOLPIDEM TARTRATE 5 MG/1
5 TABLET ORAL
Qty: 30 TABLET | Refills: 2 | Status: SHIPPED | OUTPATIENT
Start: 2020-05-26 | End: 2020-08-17

## 2020-05-27 NOTE — TELEPHONE ENCOUNTER
Please call and schedule a phone visit since it will be easier to discuss by phone rather than e-visit and I will cancel this.

## 2020-05-27 NOTE — TELEPHONE ENCOUNTER
Virtual visit scheduled with PCP tomorrow at 11:15 am. Please cancel E-visit, thanks!.    Harper CHAMBERLAIN     [facility]

## 2020-05-28 ENCOUNTER — VIRTUAL VISIT (OUTPATIENT)
Dept: FAMILY MEDICINE | Facility: CLINIC | Age: 45
End: 2020-05-28
Payer: COMMERCIAL

## 2020-05-28 DIAGNOSIS — G47.00 INSOMNIA, UNSPECIFIED TYPE: ICD-10-CM

## 2020-05-28 DIAGNOSIS — F32.1 MODERATE MAJOR DEPRESSION (H): Primary | ICD-10-CM

## 2020-05-28 PROCEDURE — 99214 OFFICE O/P EST MOD 30 MIN: CPT | Mod: GT | Performed by: NURSE PRACTITIONER

## 2020-05-28 RX ORDER — ZOLPIDEM TARTRATE 10 MG/1
5 TABLET ORAL
Qty: 30 TABLET | Refills: 3 | Status: SHIPPED | OUTPATIENT
Start: 2020-05-28 | End: 2020-10-19

## 2020-05-28 RX ORDER — BUPROPION HYDROCHLORIDE 150 MG/1
TABLET ORAL
Qty: 90 TABLET | Refills: 5 | Status: SHIPPED | OUTPATIENT
Start: 2020-05-28 | End: 2021-01-07

## 2020-05-28 NOTE — PROGRESS NOTES
"Leandra Chris is a 45 year old female who is being evaluated via a billable video visit.      The patient has been notified of following:     \"This video visit will be conducted via a call between you and your physician/provider. We have found that certain health care needs can be provided without the need for an in-person physical exam.  This service lets us provide the care you need with a video conversation.  If a prescription is necessary we can send it directly to your pharmacy.  If lab work is needed we can place an order for that and you can then stop by our lab to have the test done at a later time.    Video visits are billed at different rates depending on your insurance coverage.  Please reach out to your insurance provider with any questions.    If during the course of the call the physician/provider feels a video visit is not appropriate, you will not be charged for this service.\"    Patient has given verbal consent for Video visit? Yes    How would you like to obtain your AVS? Lonnie    Patient would like the video invitation sent by: Send to e-mail at: [email or web address]    Will anyone else be joining your video visit? No (pt is at work)     Subjective     Leandra Chris is a 45 year old female who presents today via video visit for the following health issues:    HPI   Discuss Medication     PHQ 12/3/2019 3/12/2020 5/21/2020   PHQ-9 Total Score 16 8 13   Q9: Thoughts of better off dead/self-harm past 2 weeks Several days Not at all Not at all   F/U: Thoughts of suicide or self-harm No - -   F/U: Safety concerns No - -       Overall, her depression has been stable, but she does have intermittent periods of increased depression, anxiety, irritability.  Her therapist has noticed that this seems to occur cyclically.    It lasts for about 3-4 days and occurs right before her period.  She is now getting her periods regularly, where she hasn't in the past due to PCOS.         Video Start Time: " "11:41            Reviewed and updated as needed this visit by Provider         Review of Systems   CONSTITUTIONAL: NEGATIVE for fever, chills, change in weight  ENT/MOUTH: NEGATIVE for ear, mouth and throat problems  RESP: NEGATIVE for significant cough or SOB  CV: NEGATIVE for chest pain, palpitations or peripheral edema  GI: NEGATIVE for nausea, abdominal pain, heartburn, or change in bowel habits  MUSCULOSKELETAL: NEGATIVE for significant arthralgias or myalgia  NEURO: NEGATIVE for weakness, dizziness or paresthesias  PSYCHIATRIC: see HPI      Objective    There were no vitals taken for this visit.  Estimated body mass index is 41.24 kg/m  as calculated from the following:    Height as of 1/30/20: 1.689 m (5' 6.5\").    Weight as of 1/30/20: 117.7 kg (259 lb 6.4 oz).  Physical Exam     GENERAL: Healthy, alert and no distress  EYES: Eyes grossly normal to inspection.  No discharge or erythema, or obvious scleral/conjunctival abnormalities.  RESP: No audible wheeze, cough, or visible cyanosis.  No visible retractions or increased work of breathing.    SKIN: Visible skin clear. No significant rash, abnormal pigmentation or lesions.  NEURO: Cranial nerves grossly intact.  Mentation and speech appropriate for age.  PSYCH: Mentation appears normal, affect normal/bright, judgement and insight intact, normal speech and appearance well-groomed.              Assessment & Plan     (F32.1) Moderate major depression (H)  (primary encounter diagnosis)  Comment: not in complete remission  Plan: vortioxetine (TRINTELLIX) 10 MG tablet,         buPROPion (WELLBUTRIN XL) 150 MG 24 hr tablet        Exacerbations are likely related to PMDD.  Will have her increase the dose of Trintellix a week before her period or at least on the day she notices symptoms are worse.   Follow up if symptoms are not improving.      (G47.00) Insomnia, unspecified type  Comment:   Plan: zolpidem (AMBIEN) 10 MG tablet        .The current medical regimen is " "effective;  continue present plan and medications.        BMI:   Estimated body mass index is 41.24 kg/m  as calculated from the following:    Height as of 1/30/20: 1.689 m (5' 6.5\").    Weight as of 1/30/20: 117.7 kg (259 lb 6.4 oz).               No follow-ups on file.    Nelli Nye NP  [facility]      Video-Visit Details    Type of service:  Video Visit    Video End Time:11:56 AM    Originating Location (pt. Location): Home    Distant Location (provider location):  [facility]     Platform used for Video Visit: [email or web address]    No follow-ups on file.       Nelli Nye NP        "

## 2020-06-17 ENCOUNTER — E-VISIT (OUTPATIENT)
Dept: FAMILY MEDICINE | Facility: CLINIC | Age: 45
End: 2020-06-17
Payer: COMMERCIAL

## 2020-06-17 DIAGNOSIS — R23.3 EASY BRUISING: Primary | ICD-10-CM

## 2020-06-17 PROCEDURE — 99207 ZZC NO BILLABLE SERVICE THIS VISIT: CPT | Performed by: NURSE PRACTITIONER

## 2020-06-25 DIAGNOSIS — R23.3 EASY BRUISING: ICD-10-CM

## 2020-06-25 LAB
APTT PPP: 33 SEC (ref 22–37)
ERYTHROCYTE [DISTWIDTH] IN BLOOD BY AUTOMATED COUNT: 12.8 % (ref 10–15)
HCT VFR BLD AUTO: 42.3 % (ref 35–47)
HGB BLD-MCNC: 13.7 G/DL (ref 11.7–15.7)
INR PPP: 0.97 (ref 0.86–1.14)
MCH RBC QN AUTO: 30.5 PG (ref 26.5–33)
MCHC RBC AUTO-ENTMCNC: 32.4 G/DL (ref 31.5–36.5)
MCV RBC AUTO: 94 FL (ref 78–100)
PLATELET # BLD AUTO: 272 10E9/L (ref 150–450)
RBC # BLD AUTO: 4.49 10E12/L (ref 3.8–5.2)
WBC # BLD AUTO: 8.1 10E9/L (ref 4–11)

## 2020-06-25 PROCEDURE — 36415 COLL VENOUS BLD VENIPUNCTURE: CPT | Performed by: NURSE PRACTITIONER

## 2020-06-25 PROCEDURE — 85730 THROMBOPLASTIN TIME PARTIAL: CPT | Performed by: NURSE PRACTITIONER

## 2020-06-25 PROCEDURE — 85027 COMPLETE CBC AUTOMATED: CPT | Performed by: NURSE PRACTITIONER

## 2020-06-25 PROCEDURE — 85610 PROTHROMBIN TIME: CPT | Performed by: NURSE PRACTITIONER

## 2020-06-25 NOTE — RESULT ENCOUNTER NOTE
Camilla Morse Aries,  Your results came back and are within acceptable limits.If you have any further concerns please do not hesitate to contact us by message, phone or making an appointment.  Have a good day   Dr Chance WARD

## 2020-06-25 NOTE — RESULT ENCOUNTER NOTE
Camilla Ms. Chris,  Your results came back and are within acceptable limits. -Normal red blood cell (hgb) levels, normal white blood cell count and normal platelet levels.. If you have any further concerns please do not hesitate to contact us by message, phone or making an appointment.  Have a good day   Dr Chance WARD

## 2020-08-05 ENCOUNTER — MYC MEDICAL ADVICE (OUTPATIENT)
Dept: FAMILY MEDICINE | Facility: CLINIC | Age: 45
End: 2020-08-05

## 2020-08-05 DIAGNOSIS — F32.1 MODERATE MAJOR DEPRESSION (H): ICD-10-CM

## 2020-08-05 NOTE — TELEPHONE ENCOUNTER
After Visit Summary   8/10/2018    Margo Inman    MRN: [id number]           Patient Information     Date Of Birth          1960        Visit Information        Provider Department      8/10/2018 11:40 AM Belem Dumont APRN CNP [facility]        Care Instructions    At [facility], we strive to deliver an exceptional experience to you, every time we see you.  If you receive a survey in the mail, please send us back your thoughts. We really do value your feedback.    Based on your medical history, these are the current health maintenance/preventive care services that you are due for (some may have been done at this visit.)  Health Maintenance Due   Topic Date Due     HIV SCREEN (SYSTEM ASSIGNED)  01/12/1980     ADVANCE DIRECTIVE PLANNING Q5 YRS  01/12/2017       Suggested websites for health information:  Www.Browsercast.com : Up to date and easily searchable information on multiple topics.  Www.[facility].gov : medication info, interactive tutorials, watch real surgeries online  Www.familydoctor.org : good info from the Academy of Family Physicians  Www.cdc.gov : public health info, travel advisories, epidemics (H1N1)  Www.aap.org : children's health info, normal development, vaccinations  Www.health.state.[place].us : [place] dept of health, public health issues in [place], N1N1    Your care team:                            Family Medicine Internal Medicine   MD David Wagoner MD Shantel Branch-Fleming, MD Katya Georgiev PA-C Megan Hill, APRN CNP Nam Ho, MD Pediatrics   OLIVIA Lucia CNP Paula Brito, MD Amelia Massimini APRN CNP Shaista Malik, MD Bethany Templen, MD Deborah Mielke, MD Kim Thein, APRN CNP      Clinic hours: Monday - Thursday 7 am-7 pm; Fridays 7 am-5 pm.   Urgent care: Monday - Friday 11 am-9 pm; Saturday and Sunday 9 am-5 pm.  Pharmacy : Monday -Thursday 8 am-8 pm; Friday 8 am-6 pm; Saturday  This InRiver message is being routed to provider for response to pt. Increase in trintellix request      Aaliyah Mckeon RN       and Sunday 9 am-5 pm.     Clinic: [phone]   Pharmacy: [phone]    [facility] Hypertension Study  Visit 5    Thank you for your continued participation in the hypertension study!  Please continue taking your hypertension medications as directed. Your next visit will be in one and a half months and will be scheduled for you in the coming days. After it is scheduled, be sure to let the research coordinator know as soon as possible if you cannot make it so that the visit can be rescheduled for you.     Please contact the research coordinator if you receive care for your hypertension outside of your study visits.    If you have any questions, please contact the research coordinator at [phone]. If you experience any symptoms please call the [facility] 24/7 triage number at [phone]. If your phone number, email or address changes please alert the research coordinator.     In the meantime, we ask that you complete the online surveys emailed out to  you, if completing online, or mailed out to you, if completing paper surveys,  before your next visit.    Lifestyle changes that can help control high blood pressure:  Even though [facility] is a study to test effectiveness of genetically guided medications for managing high blood pressure, there are several things you can do to ensure your blood pressure stays in good control:    Maintain a healthy weight (BMI<26). A modest amount of weight loss can be helpful    Limit salt intake to under 2400mg daily    Follow the DASH diet (lean meats, low salt, whole grains, lots of fruits/vegies)    Stay active, try to get in 30 minutes of exercise daily.    Manage your daily stress.    Do not smoke cigarettes (or cut back)    Limit alcohol (2 drinks/day for men, 1 drink/day for women)            Follow-ups after your visit        Who to contact     If you have questions or need follow up information about today's clinic visit or your schedule please contact [facility]  "[facility] directly at [phone].  Normal or non-critical lab and imaging results will be communicated to you by MyChart, letter or phone within 4 business days after the clinic has received the results. If you do not hear from us within 7 days, please contact the clinic through MyChart or phone. If you have a critical or abnormal lab result, we will notify you by phone as soon as possible.  Submit refill requests through TerraSkyhart or call your pharmacy and they will forward the refill request to us. Please allow 3 business days for your refill to be completed.          Additional Information About Your Visit        Care EveryWhere ID     This is your Care EveryWhere ID. This could be used by other organizations to access your [facility] medical records  [id number]        Your Vitals Were     Pulse Temperature Height Last Period Pulse Oximetry BMI (Body Mass Index)    84 97.8  F (36.6  C) (Oral) 5' 1\" (1.549 m) 09/16/2011 97% 38.36 kg/m2       Blood Pressure from Last 3 Encounters:   08/10/18 126/70   06/27/18 126/78   05/30/18 138/83    Weight from Last 3 Encounters:   08/10/18 203 lb (92.1 kg)   06/27/18 196 lb (88.9 kg)   05/30/18 197 lb 12.8 oz (89.7 kg)              Today, you had the following     No orders found for display       Primary Care Provider Office Phone # Fax #    Belem JOSE R Santyoo [phone]       [address] [place] [address]        Equal Access to Services     FLAKITA CLAYTON : Hadii aicha ellswortho Sopetros, waaxda luqadaha, qaybta kaalmada james escalante . So [facility] [phone].    ATENCIÓN: Si habla español, tiene a alberto disposición servicios gratuitos de asistencia lingüística. Llame al [phone].    We comply with applicable federal civil rights laws and [place] laws. We do not discriminate on the basis of race, color, national origin, age, disability, sex, sexual orientation, or gender identity.          "   Thank you!     Thank you for choosing [facility]  for your care. Our goal is always to provide you with excellent care. Hearing back from our patients is one way we can continue to improve our services. Please take a few minutes to complete the written survey that you may receive in the mail after your visit with us. Thank you!             Your Updated Medication List - Protect others around you: Learn how to safely use, store and throw away your medicines at www.disposemymeds.org.          This list is accurate as of 8/10/18 12:16 PM.  Always use your most recent med list.                   Brand Name Dispense Instructions for use Diagnosis    acetaminophen 500 MG tablet    TYLENOL     Take 500-1,000 mg by mouth every 6 hours as needed.        atorvastatin 80 MG tablet    LIPITOR    90 tablet    Take 1 tablet (80 mg) by mouth daily    Hyperlipidemia LDL goal <130       butalbital-acetaminophen-caffeine -40 MG per tablet    FIORICET/ESGIC    28 tablet    TAKE 1 TABLET BY MOUTH EVERY 4 HOURS AS NEEDED    Tension headache       cyclobenzaprine 10 MG tablet    FLEXERIL    30 tablet    TAKE 1 TABLET BY MOUTH AT BEDTIME AS NEEDED FOR MUSCLE SPASMS    S/P cervical spinal fusion       diclofenac 75 MG EC tablet    VOLTAREN    60 tablet    Take 1 tablet (75 mg) by mouth 2 times daily        lisinopril 10 MG tablet    PRINIVIL/ZESTRIL    30 tablet    Take 1 tablet (10 mg) by mouth daily    Essential hypertension with goal blood pressure less than 140/90       order for DME     1 Device    Equipment being ordered: BP cuff/machine    Essential hypertension       order for DME     1 Device    Equipment being ordered: Blood pressure cuff/machine    Essential hypertension with goal blood pressure less than 140/90       * varenicline 0.5 MG X 11 & 1 MG X 42 tablet    CHANTIX STARTING MONTH EVANGELINA    53 tablet    Take 0.5 mg tab daily for 3 days, then 0.5 mg tab twice daily for 4 days, then 1 mg twice  daily.    Tobacco dependence syndrome       * varenicline 1 MG tablet    CHANTIX    56 tablet    Take 1 tablet (1 mg) by mouth 2 times daily    Tobacco dependence syndrome       * Notice:  This list has 2 medication(s) that are the same as other medications prescribed for you. Read the directions carefully, and ask your doctor or other care provider to review them with you.

## 2020-08-05 NOTE — LETTER
[facility] [address] [place] [address]  [phone]        August 5, 2020      Leandra Chris  [address]  [place] [address]      Dear Leandra,    As your health care provider, I am concerned that your age and/or underlying medical condition puts you at particularly high risk for serious complications should you contract a COVID-19 infection.     Specifically, you have the following conditions/diagnoses, included as high risk conditions per the Centers for Disease Control and Prevention at CDC.gov.     Chronic lung disease  Obesity    COVID-19 is a new disease and there is limited information regarding risk factors for severe disease. Based on currently available information and clinical expertise, older adults and people of any age who have serious underlying medical conditions might be at higher risk for severe illness from COVID-19.     It is my medical opinion that you should avoid close contact with others and work from home if possible during this COVID-19 pandemic.     Nelli Nye DNP, CNP    [facility]

## 2020-08-05 NOTE — LETTER
[facility] [address] [place] [address]  [phone]        August 7, 2020      Leandra Chris  [address] [place] [address]      Dear Leandra,    As your health care provider, I am concerned that your underlying medical condition puts you at particularly high risk for serious complications should you contract a COVID-19 infection.     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      Please contact me with any questions.      Nelli Nye NP [facility]

## 2020-08-06 NOTE — TELEPHONE ENCOUNTER
Pt updated to completed letter. Writer requesting response as to how to would like to receive.    Thanks! Izzy Desai RN

## 2020-08-06 NOTE — TELEPHONE ENCOUNTER
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    Please advise,    Thanks! Izzy Desai RN

## 2020-08-07 NOTE — TELEPHONE ENCOUNTER
I will add that to her letter- print and place on Rebecca Nye CNP's desk.  Unable to add- new letter written  Mariana Wright RN

## 2020-08-08 ENCOUNTER — MYC MEDICAL ADVICE (OUTPATIENT)
Dept: FAMILY MEDICINE | Facility: CLINIC | Age: 45
End: 2020-08-08

## 2020-08-16 ENCOUNTER — MYC REFILL (OUTPATIENT)
Dept: FAMILY MEDICINE | Facility: CLINIC | Age: 45
End: 2020-08-16

## 2020-08-16 DIAGNOSIS — G43.909 MIGRAINE WITHOUT STATUS MIGRAINOSUS, NOT INTRACTABLE, UNSPECIFIED MIGRAINE TYPE: ICD-10-CM

## 2020-08-17 RX ORDER — BUTALBITAL/ASPIRIN/CAFFEINE 50-325-40
1 CAPSULE ORAL EVERY 4 HOURS PRN
Qty: 10 CAPSULE | Refills: 1 | Status: SHIPPED | OUTPATIENT
Start: 2020-08-17 | End: 2021-01-28

## 2020-09-03 ENCOUNTER — VIRTUAL VISIT (OUTPATIENT)
Dept: FAMILY MEDICINE | Facility: CLINIC | Age: 45
End: 2020-09-03
Payer: COMMERCIAL

## 2020-09-03 DIAGNOSIS — G56.03 BILATERAL CARPAL TUNNEL SYNDROME: Primary | ICD-10-CM

## 2020-09-03 PROCEDURE — 99213 OFFICE O/P EST LOW 20 MIN: CPT | Mod: GT | Performed by: NURSE PRACTITIONER

## 2020-09-03 ASSESSMENT — PATIENT HEALTH QUESTIONNAIRE - PHQ9: SUM OF ALL RESPONSES TO PHQ QUESTIONS 1-9: 10

## 2020-09-03 NOTE — PROGRESS NOTES
"Leandra Chris is a 45 year old female who is being evaluated via a billable video visit.      The patient has been notified of following:     \"This video visit will be conducted via a call between you and your physician/provider. We have found that certain health care needs can be provided without the need for an in-person physical exam.  This service lets us provide the care you need with a video conversation.  If a prescription is necessary we can send it directly to your pharmacy.  If lab work is needed we can place an order for that and you can then stop by our lab to have the test done at a later time.    Video visits are billed at different rates depending on your insurance coverage.  Please reach out to your insurance provider with any questions.    If during the course of the call the physician/provider feels a video visit is not appropriate, you will not be charged for this service.\"    Patient has given verbal consent for Video visit? Yes  How would you like to obtain your AVS? MyChart  If you are dropped from the video visit, the video invite should be resent to: Text to cell phone: [phone]  Will anyone else be joining your video visit? No       Subjective     Leandra Chris is a 45 year old female who presents today via video visit for the following health issues:    HPI    Musculoskeletal problem/pain    Onset/Duration: a couple months ago. seems to be getting worse  Description  Frequent numbness in hands and sometimes feet (only twice in her feet)  Can happen when pt is holding her son   Joint Swelling: no  Redness: no  Pain: feels like tingling   Warmth: no  Intensity:  moderate  Progression of Symptoms:  worsening  Accompanying signs and symptoms:   Fevers: no  Numbness/tingling/weakness: YES  Previous similar problem: no  Previous evaluation:  no  Therapies tried and outcome: nothing. Got a new bed mattress; hasn't helped     At first, she noticed numbness in her hands during   Bilateral but " "R>L.  Now occurring during the day, especially when holding her son.  She has not noticed any weakness.   She denies any neck pain, dizziness, vision changes, back pain, weakness.      Video Start Time: 11:44        Review of Systems   CONSTITUTIONAL: NEGATIVE for fever, chills, change in weight  ENT/MOUTH: NEGATIVE for ear, mouth and throat problems  RESP: NEGATIVE for significant cough or SOB  CV: NEGATIVE for chest pain, palpitations or peripheral edema  GI: NEGATIVE for nausea, abdominal pain, heartburn, or change in bowel habits  MUSCULOSKELETAL: see HPI  NEURO: see HPI  PSYCHIATRIC: NEGATIVE for changes in mood or affect      Objective           Vitals:  No vitals were obtained today due to virtual visit.    Physical Exam     GENERAL: Healthy, alert and no distress  EYES: Eyes grossly normal to inspection.  No discharge or erythema, or obvious scleral/conjunctival abnormalities.  RESP: No audible wheeze, cough, or visible cyanosis.  No visible retractions or increased work of breathing.    MS: No gross musculoskeletal defects noted.  Normal range of motion.  No visible edema.  SKIN: Visible skin clear. No significant rash, abnormal pigmentation or lesions.  NEURO: positive Tinel's bilaterally, positive Phalen's on the right.   PSYCH: Mentation appears normal, affect normal/bright, judgement and insight intact, normal speech and appearance well-groomed.              Assessment & Plan     Bilateral carpal tunnel syndrome  Discussed wrist braces, wear at night and during the day.  Ice area for 20 minutes TID.  Discussed exercises.  If symptoms are not improving over the next few weeks, will refer to hand orthopedist.        BMI:   Estimated body mass index is 41.24 kg/m  as calculated from the following:    Height as of 1/30/20: 1.689 m (5' 6.5\").    Weight as of 1/30/20: 117.7 kg (259 lb 6.4 oz).               No follow-ups on file.    Nelli Nye NP  [facility]      Video-Visit " Details    Type of service:  Video Visit    Video End Time:12:00 PM    Originating Location (pt. Location): Home    Distant Location (provider location):  [facility]     Platform used for Video Visit: [employer]

## 2020-09-23 DIAGNOSIS — F32.1 MODERATE MAJOR DEPRESSION (H): ICD-10-CM

## 2020-09-24 RX ORDER — BUPROPION HYDROCHLORIDE 150 MG/1
TABLET ORAL
Qty: 60 TABLET | OUTPATIENT
Start: 2020-09-24

## 2020-10-16 DIAGNOSIS — G47.33 OBSTRUCTIVE SLEEP APNEA (ADULT) (PEDIATRIC): Primary | ICD-10-CM

## 2020-10-17 ENCOUNTER — MYC MEDICAL ADVICE (OUTPATIENT)
Dept: FAMILY MEDICINE | Facility: CLINIC | Age: 45
End: 2020-10-17

## 2020-10-17 DIAGNOSIS — G47.00 INSOMNIA, UNSPECIFIED TYPE: ICD-10-CM

## 2020-10-19 RX ORDER — ZOLPIDEM TARTRATE 6.25 MG/1
6.25 TABLET, FILM COATED, EXTENDED RELEASE ORAL
Qty: 30 TABLET | Refills: 0 | Status: SHIPPED | OUTPATIENT
Start: 2020-10-19 | End: 2020-11-23

## 2020-10-19 NOTE — TELEPHONE ENCOUNTER
Is she falling asleep OK with that dose, but just not staying asleep?  If that is the case, we could try Ambien CR, which release the 5 mg right away and a smaller amount later in the night to help keep people asleep.  Otherwise, we could try 10 mg even though it is higher than the FDA recommendation for women.  Let me know what she would like to do.

## 2020-10-25 ENCOUNTER — IMMUNIZATION (OUTPATIENT)
Dept: NURSING | Facility: CLINIC | Age: 45
End: 2020-10-25
Payer: COMMERCIAL

## 2020-10-25 DIAGNOSIS — Z23 NEED FOR PROPHYLACTIC VACCINATION AND INOCULATION AGAINST INFLUENZA: Primary | ICD-10-CM

## 2020-10-25 PROCEDURE — 90686 IIV4 VACC NO PRSV 0.5 ML IM: CPT

## 2020-10-25 PROCEDURE — 90471 IMMUNIZATION ADMIN: CPT

## 2020-10-25 PROCEDURE — 99207 PR NO CHARGE NURSE ONLY: CPT

## 2020-11-23 DIAGNOSIS — G47.00 INSOMNIA, UNSPECIFIED TYPE: ICD-10-CM

## 2020-11-23 RX ORDER — ZOLPIDEM TARTRATE 6.25 MG/1
6.25 TABLET, FILM COATED, EXTENDED RELEASE ORAL
Qty: 30 TABLET | Refills: 0 | Status: CANCELLED | OUTPATIENT
Start: 2020-11-23

## 2021-01-15 ENCOUNTER — HEALTH MAINTENANCE LETTER (OUTPATIENT)
Age: 46
End: 2021-01-15

## 2021-01-27 ENCOUNTER — TELEPHONE (OUTPATIENT)
Dept: FAMILY MEDICINE | Facility: CLINIC | Age: 46
End: 2021-01-27

## 2021-01-27 DIAGNOSIS — F32.1 MODERATE MAJOR DEPRESSION (H): ICD-10-CM

## 2021-01-27 NOTE — TELEPHONE ENCOUNTER
Still having down times during the month, difficult for track when her period is.  Plan - talk about increasing her Trintellix to 20 mg daily.

## 2021-01-28 ENCOUNTER — VIRTUAL VISIT (OUTPATIENT)
Dept: FAMILY MEDICINE | Facility: CLINIC | Age: 46
End: 2021-01-28
Payer: COMMERCIAL

## 2021-01-28 DIAGNOSIS — F32.1 MODERATE MAJOR DEPRESSION (H): ICD-10-CM

## 2021-01-28 DIAGNOSIS — G47.00 INSOMNIA, UNSPECIFIED TYPE: ICD-10-CM

## 2021-01-28 PROCEDURE — 99214 OFFICE O/P EST MOD 30 MIN: CPT | Mod: GT | Performed by: NURSE PRACTITIONER

## 2021-01-28 RX ORDER — BUPROPION HYDROCHLORIDE 150 MG/1
TABLET ORAL
Qty: 270 TABLET | Refills: 3 | Status: SHIPPED | OUTPATIENT
Start: 2021-01-28 | End: 2022-01-12

## 2021-01-28 RX ORDER — ZOLPIDEM TARTRATE 10 MG/1
TABLET ORAL
Qty: 30 TABLET | Refills: 5 | Status: SHIPPED | OUTPATIENT
Start: 2021-01-28 | End: 2021-06-09

## 2021-01-28 ASSESSMENT — ANXIETY QUESTIONNAIRES
6. BECOMING EASILY ANNOYED OR IRRITABLE: NOT AT ALL
7. FEELING AFRAID AS IF SOMETHING AWFUL MIGHT HAPPEN: NOT AT ALL
3. WORRYING TOO MUCH ABOUT DIFFERENT THINGS: NOT AT ALL
2. NOT BEING ABLE TO STOP OR CONTROL WORRYING: NOT AT ALL
1. FEELING NERVOUS, ANXIOUS, OR ON EDGE: NOT AT ALL
IF YOU CHECKED OFF ANY PROBLEMS ON THIS QUESTIONNAIRE, HOW DIFFICULT HAVE THESE PROBLEMS MADE IT FOR YOU TO DO YOUR WORK, TAKE CARE OF THINGS AT HOME, OR GET ALONG WITH OTHER PEOPLE: NOT DIFFICULT AT ALL
GAD7 TOTAL SCORE: 0
5. BEING SO RESTLESS THAT IT IS HARD TO SIT STILL: NOT AT ALL

## 2021-01-28 ASSESSMENT — PATIENT HEALTH QUESTIONNAIRE - PHQ9
5. POOR APPETITE OR OVEREATING: NOT AT ALL
SUM OF ALL RESPONSES TO PHQ QUESTIONS 1-9: 11

## 2021-01-28 NOTE — PROGRESS NOTES
"Leandra is a 46 year old who is being evaluated via a billable video visit.      How would you like to obtain your AVS? MyChart  If the video visit is dropped, the invitation should be resent by: Text to cell phone: [phone]  Will anyone else be joining your video visit? No      Video Start Time: 9:25 AM  Assessment & Plan     Moderate major depression (H)  Not controlled  Will increase Trintellix to 20 mg daily.  If symptoms are not improving, will follow up.   - vortioxetine (TRINTELLIX) 20 MG tablet; Take 1 tablet (20 mg) by mouth daily  - buPROPion (WELLBUTRIN XL) 150 MG 24 hr tablet; TAKE 3 TABLETS BY MOUTH DAILY    Insomnia, unspecified type  Discussed the use and indication of this medication as well as potential side effects.   - zolpidem (AMBIEN) 10 MG tablet; TAKE 1 tablet BY MOUTH EVERY NIGHT AS NEEDED FOR SLEEP       BMI:   Estimated body mass index is 41.24 kg/m  as calculated from the following:    Height as of 1/30/20: 1.689 m (5' 6.5\").    Weight as of 1/30/20: 117.7 kg (259 lb 6.4 oz).             No follow-ups on file.    Nelli Nye NP  [facility]     Leandra is a 46 year old who presents to clinic today for the following health issues     HPI   She is having episodes of feeling really down.  Some of the time they seemed to be associated with her period, so we tried to increase her Trintellix to 20 mg the week before her period.  The higher dose is helpful, but these episodes are more unpredictable and then will take a couple of days for the higher dose to kick in.      AMbien 5 mg was not working well enough, CR caused too much drowsiness in the morning.   10 mg works better.     Depression Followup    How are you doing with your depression since your last visit? No change    Are you having other symptoms that might be associated with depression? No    Have you had a significant life event?  No     Are you feeling anxious or having panic " attacks?   No    Do you have any concerns with your use of alcohol or other drugs? No    Social History     Tobacco Use     Smoking status: Former Smoker     Packs/day: 0.50     Years: 13.00     Pack years: 6.50     Types: Cigarettes     Start date: 1999     Quit date: 2012     Years since quittin.9     Smokeless tobacco: Never Used   Substance Use Topics     Alcohol use: Yes     Comment: rarely     Drug use: No     PHQ 9/3/2020 1/15/2021 2021   PHQ-9 Total Score 10 12 11   Q9: Thoughts of better off dead/self-harm past 2 weeks Not at all Not at all Not at all   F/U: Thoughts of suicide or self-harm - - -   F/U: Safety concerns - - -     DUNCAN-7 SCORE 12/3/2019 1/15/2021 2021   Total Score - - -   Total Score 4 (minimal anxiety) 9 (mild anxiety) -   Total Score 4 9 0           Suicide Assessment Five-step Evaluation and Treatment (SAFE-T)      How many servings of fruits and vegetables do you eat daily?  2-3    On average, how many sweetened beverages do you drink each day (Examples: soda, juice, sweet tea, etc.  Do NOT count diet or artificially sweetened beverages)?   3    How many days per week do you exercise enough to make your heart beat faster? 7    How many minutes a day do you exercise enough to make your heart beat faster? 20 - 29    How many days per week do you miss taking your medication? 0        Review of Systems         Objective           Vitals:  No vitals were obtained today due to virtual visit.    Physical Exam   GENERAL: Healthy, alert and no distress  EYES: Eyes grossly normal to inspection.  No discharge or erythema, or obvious scleral/conjunctival abnormalities.  RESP: No audible wheeze, cough, or visible cyanosis.  No visible retractions or increased work of breathing.    SKIN: Visible skin clear. No significant rash, abnormal pigmentation or lesions.  NEURO: Cranial nerves grossly intact.  Mentation and speech appropriate for age.  PSYCH: Mentation appears normal,  affect normal/bright, judgement and insight intact, normal speech and appearance well-groomed.                Video-Visit Details    Type of service:  Video Visit    Video End Time:9:39 AM    Originating Location (pt. Location): Home    Distant Location (provider location):  [facility]     Platform used for Video Visit: [employer]

## 2021-01-29 ASSESSMENT — ANXIETY QUESTIONNAIRES: GAD7 TOTAL SCORE: 0

## 2021-02-09 ENCOUNTER — MYC MEDICAL ADVICE (OUTPATIENT)
Dept: URGENT CARE | Facility: URGENT CARE | Age: 46
End: 2021-02-09

## 2021-02-09 NOTE — TELEPHONE ENCOUNTER
Patient Quality Outreach      Summary:    Patient has the following on her problem list/HM:     Depression / Dysthymia review    6 Month Remission: 4-8 month window range: Follow Up Start date: 1/2/2021-5/2/2021  Working on Nelli Nye's depression quality list       PHQ-9 SCORE 9/3/2020 1/15/2021 1/28/2021   PHQ-9 Total Score - - -   PHQ-9 Total Score MyChart - 12 (Moderate depression) -   PHQ-9 Total Score 10 12 11       If PHQ-9 recheck is 5 or more, route to provider for next steps.    Patient is due/failing the following:   PHQ-9    Type of outreach:    Sent [employer] message.    Questions for provider review:    None                                                                                                                                     Anastasiya Cabrera MA       Chart routed to N/A.

## 2021-03-23 ASSESSMENT — ANXIETY QUESTIONNAIRES
GAD7 TOTAL SCORE: 4
5. BEING SO RESTLESS THAT IT IS HARD TO SIT STILL: NOT AT ALL
GAD7 TOTAL SCORE: 4
7. FEELING AFRAID AS IF SOMETHING AWFUL MIGHT HAPPEN: SEVERAL DAYS
3. WORRYING TOO MUCH ABOUT DIFFERENT THINGS: NOT AT ALL
GAD7 TOTAL SCORE: 4
7. FEELING AFRAID AS IF SOMETHING AWFUL MIGHT HAPPEN: SEVERAL DAYS
6. BECOMING EASILY ANNOYED OR IRRITABLE: SEVERAL DAYS
4. TROUBLE RELAXING: NOT AT ALL
2. NOT BEING ABLE TO STOP OR CONTROL WORRYING: SEVERAL DAYS
1. FEELING NERVOUS, ANXIOUS, OR ON EDGE: SEVERAL DAYS

## 2021-03-24 ASSESSMENT — PATIENT HEALTH QUESTIONNAIRE - PHQ9: SUM OF ALL RESPONSES TO PHQ QUESTIONS 1-9: 13

## 2021-03-24 ASSESSMENT — ANXIETY QUESTIONNAIRES: GAD7 TOTAL SCORE: 4

## 2021-03-25 NOTE — TELEPHONE ENCOUNTER
Patient completed PHQ-9/DUNCAN-7 on mychart     PHQ 1/15/2021 1/28/2021 3/23/2021   PHQ-9 Total Score 12 11 13   Q9: Thoughts of better off dead/self-harm past 2 weeks Not at all Not at all More than half the days   F/U: Thoughts of suicide or self-harm - - Yes   F/U: Self harm-plan - - Yes   F/U: Self-harm action - - No   F/U: Safety concerns - - No     DUNCAN-7 SCORE 1/15/2021 1/28/2021 3/23/2021   Total Score - - -   Total Score 9 (mild anxiety) - 4 (minimal anxiety)   Total Score 9 0 4         Anastasiya Cabrera MA

## 2021-03-29 NOTE — TELEPHONE ENCOUNTER
Patient sent back PHQ-9    PCP would you like her to schedule an office visit?    Anastasiya Cabrera MA

## 2021-04-02 ENCOUNTER — E-VISIT (OUTPATIENT)
Dept: URGENT CARE | Facility: URGENT CARE | Age: 46
End: 2021-04-02
Payer: COMMERCIAL

## 2021-04-02 DIAGNOSIS — Z20.822 SUSPECTED COVID-19 VIRUS INFECTION: Primary | ICD-10-CM

## 2021-04-02 PROCEDURE — 99421 OL DIG E/M SVC 5-10 MIN: CPT | Performed by: PHYSICIAN ASSISTANT

## 2021-04-02 NOTE — PATIENT INSTRUCTIONS
Dear Leandra Chris,    Your symptoms show that you may have coronavirus (COVID-19). This illness can cause fever, cough and trouble breathing. Many people get a mild case and get better on their own. Some people can get very sick.    Will I be tested for COVID-19?  We would like to test you for Covid-19 virus. I have placed orders for this test.     To schedule: go to your [employer] home page and scroll down to the section that says  You have an appointment that needs to be scheduled  and click the large green button that says  Schedule Now  and follow the steps to find the next available openings.    If you are unable to complete these [employer] scheduling steps, please call [phone] to schedule your testing.     Return to work/school/ guidance:  Please let your workplace manager and staffing office know when your quarantine ends     We can t give you an exact date as it depends on the above. You can calculate this on your own or work with your manager/staffing office to calculate this. (For example if you were exposed on 10/4, you would have to quarantine for 14 full days. That would be through 10/18. You could return on 10/19.)      If you receive a positive COVID-19 test result, follow the guidance of the those who are giving you the results. Usually the return to work is 10 (or in some cases 20 days from symptom onset.) If you work at [facility], you must also be cleared by Employee Occupational Health and Safety to return to work.        If you receive a negative COVID-19 test result and did not have a high risk exposure to someone with a known positive COVID-19 test, you can return to work once you're free of fever for 24 hours without fever-reducing medication and your symptoms are improving or resolved.      If you receive a negative COVID-19 test and If you had a high risk exposure to someone who has tested positive for COVID-19 then you can return to work 14 days after your last contact  with the positive individual    Note: If you have ongoing exposure to the covid positive person, this quarantine period may be more than 14 days. (For example, if you are continued to be exposed to your child who tested positive and cannot isolate from them, then the quarantine of 7-14 days can't start until your child is no longer contagious. This is typically 10 days from onset of the child's symptoms. So the total duration may be 17-24 days in this case.)    Sign up for [employer].   We know it's scary to hear that you might have COVID-19. We want to track your symptoms to make sure you're okay over the next 2 weeks. Please look for an email from [employer]--this is a free, online program that we'll use to keep in touch. To sign up, follow the link in the email you will receive. Learn more at [email or web address]    How can I take care of myself?    Get lots of rest. Drink extra fluids (unless a doctor has told you not to)    Take Tylenol (acetaminophen) or ibuprofen for fever or pain. If you have liver or kidney problems, ask your family doctor if it's okay to take Tylenol o ibuprofen    If you have other health problems (like cancer, heart failure, an organ transplant or severe kidney disease): Call your specialty clinic if you don't feel better in the next 2 days.    Know when to call 911. Emergency warning signs include:  o Trouble breathing or shortness of breath  o Pain or pressure in the chest that doesn't go away  o Feeling confused like you haven't felt before, or not being able to wake up  o Bluish-colored lips or face    Where can I get more information?  [facility] - About COVID-19:   [email or web address]    CDC - What to Do If You're Sick:   www.cdc.gov/coronavirus/2019-ncov/about/steps-when-sick.html

## 2021-05-04 ENCOUNTER — OFFICE VISIT (OUTPATIENT)
Dept: FAMILY MEDICINE | Facility: CLINIC | Age: 46
End: 2021-05-04
Payer: COMMERCIAL

## 2021-05-04 VITALS
DIASTOLIC BLOOD PRESSURE: 84 MMHG | TEMPERATURE: 97.8 F | RESPIRATION RATE: 16 BRPM | OXYGEN SATURATION: 98 % | BODY MASS INDEX: 43.24 KG/M2 | SYSTOLIC BLOOD PRESSURE: 122 MMHG | WEIGHT: 272 LBS | HEART RATE: 98 BPM

## 2021-05-04 DIAGNOSIS — M25.561 ACUTE PAIN OF RIGHT KNEE: Primary | ICD-10-CM

## 2021-05-04 LAB
D DIMER PPP FEU-MCNC: 0.5 UG/ML FEU (ref 0–0.5)
ERYTHROCYTE [SEDIMENTATION RATE] IN BLOOD BY WESTERGREN METHOD: 17 MM/H (ref 0–20)

## 2021-05-04 PROCEDURE — 36415 COLL VENOUS BLD VENIPUNCTURE: CPT | Performed by: NURSE PRACTITIONER

## 2021-05-04 PROCEDURE — 85379 FIBRIN DEGRADATION QUANT: CPT | Performed by: NURSE PRACTITIONER

## 2021-05-04 PROCEDURE — 86038 ANTINUCLEAR ANTIBODIES: CPT | Performed by: NURSE PRACTITIONER

## 2021-05-04 PROCEDURE — 99214 OFFICE O/P EST MOD 30 MIN: CPT | Performed by: NURSE PRACTITIONER

## 2021-05-04 PROCEDURE — 86431 RHEUMATOID FACTOR QUANT: CPT | Performed by: NURSE PRACTITIONER

## 2021-05-04 PROCEDURE — 85652 RBC SED RATE AUTOMATED: CPT | Performed by: NURSE PRACTITIONER

## 2021-05-04 RX ORDER — MELOXICAM 7.5 MG/1
7.5 TABLET ORAL DAILY
Qty: 30 TABLET | Refills: 0 | Status: SHIPPED | OUTPATIENT
Start: 2021-05-04 | End: 2021-09-15

## 2021-05-04 NOTE — PROGRESS NOTES
Assessment & Plan     Acute pain of right knee  Low suspicion forDVT or autoimmune rheumatological condition, but will check labs to rule out.  Possible Baker's cyst.  Will try treating with meloxicam.  Discussed the use and indication of this medication as well as potential side effects.   Knee brace given.  If symptoms are not improving over the next 2 weeks, will refer to sports medicine.   - Knee Supplies Order  - meloxicam (MOBIC) 7.5 MG tablet; Take 1 tablet (7.5 mg) by mouth daily  - D dimer, quantitative  - Anti Nuclear Saloni IgG by IFA with Reflex  - ESR: Erythrocyte sedimentation rate  - Rheumatoid factor      30 minutes spent on the date of the encounter doing patient visit and documentation            Return if symptoms worsen or fail to improve.    Nelli Nye NP  [facility]    Ariela Mobley is a 46 year old who presents for the following health issues     HPI     Musculoskeletal problem/pain  Onset/Duration: couple of weeks after last dose of COVID shot 3/14/21  Description  Location: knee - bilateral  Joint Swelling: YES- feels like it  Redness: no  Pain: YES  Warmth: no  Intensity:  moderate  Progression of Symptoms:  same and intermittent  Accompanying signs and symptoms:   Fevers: no  Numbness/tingling/weakness: no  History  Trauma to the area: no  Recent illness:  no  Previous similar problem: no  Previous evaluation:  no  Precipitating or alleviating factors:  Aggravating factors include: sitting, standing, walking and overuse  Therapies tried and outcome: rest/inactivity  Tried Advil, but this upset her stomach, Tylenol does not help as much.     Knee pain seemed to start a week after her second COVID injection, has persisted over a month.  Pain is mostly in right knee, now a bit in left knee.  Pain is posterior and radiates into her calf.   She does have family history of autoimmune disease and DVT and wanted to be evaluated.          Review of  Systems         Objective    /84 (BP Location: Right arm, Patient Position: Sitting, Cuff Size: Adult Large)   Pulse 98   Temp 97.8  F (36.6  C) (Tympanic)   Resp 16   Wt 123.4 kg (272 lb)   LMP 03/30/2021 (Approximate)   SpO2 98%   Breastfeeding No   BMI 43.24 kg/m    Body mass index is 43.24 kg/m .  Physical Exam   GENERAL: healthy, alert and no distress  MS: Knee is normal to inspection and palpation, without erythema, warmth or effusion.  There is no joint line tenderness; range of motion is within normal limits.  No abnormal movement or tenderness of the patella.  Negative Drawer sign, Lachman's and Bradford's.  Ligaments are stable with varus and valgus pressure.  No edema of LE  PSYCH: mentation appears normal, affect normal/bright                DME (Durable Medical Equipment) Orders and Documentation  Orders Placed This Encounter   Procedures     Knee Supplies Order      The patient was assessed and it was determined the patient is in need of the following listed DME Supplies/Equipment. Please complete supporting documentation below to demonstrate medical necessity.      Knee Bracing Supplies Documentation  Patient requires the use of the ordered bracing device due to following medical need/condition: knee pain

## 2021-05-05 LAB
ANA SER QL IF: NEGATIVE
RHEUMATOID FACT SER NEPH-ACNC: <7 IU/ML (ref 0–20)

## 2021-06-09 ENCOUNTER — VIRTUAL VISIT (OUTPATIENT)
Dept: FAMILY MEDICINE | Facility: CLINIC | Age: 46
End: 2021-06-09
Payer: COMMERCIAL

## 2021-06-09 DIAGNOSIS — F32.1 MODERATE MAJOR DEPRESSION (H): ICD-10-CM

## 2021-06-09 DIAGNOSIS — F40.243 FLYING PHOBIA: Primary | ICD-10-CM

## 2021-06-09 DIAGNOSIS — E66.01 MORBID OBESITY WITH BMI OF 40.0-44.9, ADULT (H): ICD-10-CM

## 2021-06-09 DIAGNOSIS — G47.00 INSOMNIA, UNSPECIFIED TYPE: ICD-10-CM

## 2021-06-09 PROCEDURE — 99213 OFFICE O/P EST LOW 20 MIN: CPT | Mod: GT | Performed by: NURSE PRACTITIONER

## 2021-06-09 RX ORDER — ZOLPIDEM TARTRATE 10 MG/1
TABLET ORAL
Qty: 30 TABLET | Refills: 5 | Status: SHIPPED | OUTPATIENT
Start: 2021-06-09 | End: 2021-12-23

## 2021-06-09 RX ORDER — ALPRAZOLAM 0.5 MG
TABLET ORAL
Qty: 20 TABLET | Refills: 0 | Status: SHIPPED | OUTPATIENT
Start: 2021-06-09 | End: 2022-02-22

## 2021-06-09 NOTE — PROGRESS NOTES
Leandra is a 46 year old who is being evaluated via a billable video visit.      How would you like to obtain your AVS? MyChart  If the video visit is dropped, the invitation should be resent by: Text to cell phone: TEXT [phone]   Will anyone else be joining your video visit?     Video Start Time: 10:32 AM    Assessment & Plan     Flying phobia  Refills given.   - ALPRAZolam (XANAX) 0.5 MG tablet; Take 1-2 tablets 45 minutes prior to flight    Insomnia, unspecified type  Stable  The current medical regimen is effective;  continue present plan and medications.   - zolpidem (AMBIEN) 10 MG tablet; TAKE 1 tablet BY MOUTH EVERY NIGHT AS NEEDED FOR SLEEP    Moderate major depression (H)  In remission  The current medical regimen is effective;  continue present plan and medications.     BMI of 40.0-44.9, adult (H)  Continue to work on diet and exercise.           No follow-ups on file.    Nelli Nye NP  [facility]   Leandra is a 46 year old who presents for the following health issues     HPI       Going to [place] and then [place], has flight anxiety.  She has used Xanax in the past, which has worked well.    Her depression is currently well-controlled.  She is doing well on her current medications and denies any side effects.    She is sleeping well with Ambien.        Review of Systems         Objective           Vitals:  No vitals were obtained today due to virtual visit.    Physical Exam   GENERAL: Healthy, alert and no distress  EYES: Eyes grossly normal to inspection.  No discharge or erythema, or obvious scleral/conjunctival abnormalities.  RESP: No audible wheeze, cough, or visible cyanosis.  No visible retractions or increased work of breathing.    SKIN: Visible skin clear. No significant rash, abnormal pigmentation or lesions.  NEURO: Cranial nerves grossly intact.  Mentation and speech appropriate for age.  PSYCH: Mentation appears normal, affect  normal/bright, judgement and insight intact, normal speech and appearance well-groomed.                Video-Visit Details    Type of service:  Video Visit    Video End Time:10:43 AM    Originating Location (pt. Location): Home    Distant Location (provider location):  [facility]     Platform used for Video Visit: [employer]

## 2021-06-25 ENCOUNTER — VIRTUAL VISIT (OUTPATIENT)
Dept: URGENT CARE | Facility: CLINIC | Age: 46
End: 2021-06-25
Payer: COMMERCIAL

## 2021-06-25 DIAGNOSIS — J02.9 SORE THROAT: Primary | ICD-10-CM

## 2021-06-25 PROCEDURE — 99213 OFFICE O/P EST LOW 20 MIN: CPT | Mod: GT

## 2021-06-25 NOTE — PATIENT INSTRUCTIONS
Go to [facility] to be tested (lab order placed for strep) if positive we can prescribe antibiotics.     Otherwise if negative recommend symptomatic treatment for viral illness. (tylenol, ibuprofen, cold remedies)       If symptoms worsen please seek medical attention

## 2021-06-25 NOTE — PROGRESS NOTES
"The patient has been notified of following:     The patient has been notified of following:     \"This video visit will be conducted via a call between you and your physician/provider. We have found that certain health care needs can be provided without the need for an in-person physical exam.  This service lets us provide the care you need with a video conversation.  If a prescription is necessary we can send it directly to your pharmacy.  If lab work is needed we can place an order for that and you can then stop by our lab to have the test done at a later time.    Video visits are billed at different rates depending on your insurance coverage.  Please reach out to your insurance provider with any questions.    If during the course of the call the physician/provider feels a video visit is not appropriate, you will not be charged for this service.\"    Patient has given verbal consent for Video visit? Yes  How would you like to obtain your AVS? MyChart  If you are dropped from the video visit, the video invite should be resent to: Text to cell phone: mobile  Will anyone else be joining your video visit? No        Subjective:   Leandra Chris is a 46 year old female who is contacted via telephone thru scheduled urgent care virtual visit to discuss: No chief complaint on file.    Son exposed to strep  Last night patient feeling a sore throat. No cough present.   Patient had strep     NO recorded fevers  NO body aches or fevers  No reported vomiting/diarrhea      Patient Active Problem List    Diagnosis Date Noted     Moderate major depression (H) 09/26/2019     Priority: Medium     BMI of 40.0-44.9, adult (H) 04/04/2017     Priority: Medium     Migraine headache 10/21/2011     Priority: Medium     Obesity 10/21/2011     Priority: Medium     CARDIOVASCULAR SCREENING; LDL GOAL LESS THAN 160 10/31/2010     Priority: Medium     Insomnia 07/08/2010     Priority: Medium     Anxiety 04/09/2009     Priority: Medium     Vitamin D " deficiency 04/09/2009     Priority: Medium     PCOS (polycystic ovarian syndrome) 02/20/2009     Priority: Medium       Current Outpatient Medications   Medication     Acetaminophen (TYLENOL PO)     ALPRAZolam (XANAX) 0.5 MG tablet     buPROPion (WELLBUTRIN XL) 150 MG 24 hr tablet     butalbital-aspirin-caffeine (FIORINAL) -40 MG capsule     IBUPROFEN PO     meloxicam (MOBIC) 7.5 MG tablet     vortioxetine (TRINTELLIX) 20 MG tablet     zolpidem (AMBIEN) 10 MG tablet     No current facility-administered medications for this visit.        ROS:  As above per HPI    Objective:   Gen:  NAD  Pulm: non-labored work of breathing  Throat: no trismus, uvula midline  No results found for any visits on 06/25/21.    Assessment & Plan:   Leandra Chris, 46 year old female who presents with:  Sore throat  - Group A Streptococcus PCR Throat Swab  Patient will go to  Urgent care to be tested (lab order placed for strep) if positive we can prescribe antibiotics. Otherwise if negative recommend symptomatic treatment for viral illness.     Video-Visit Details    Type of service:  Video Visit    Video Start Time: 926  Video End Time: 931    Originating Location (pt. Location): Home    Distant Location (provider location):  [facility] VIRTUAL URGENT CARE     Platform used for Video Visit: Erwin Montiel MD   [facility]    The use of Dragon/Storytree dictation services may have been used to construct the content in this note; any grammatical or spelling errors are non-intentional. Please contact the author of this note directly if you are in need of any clarification.

## 2021-06-29 ENCOUNTER — OFFICE VISIT (OUTPATIENT)
Dept: URGENT CARE | Facility: URGENT CARE | Age: 46
End: 2021-06-29
Payer: COMMERCIAL

## 2021-06-29 VITALS
HEART RATE: 92 BPM | DIASTOLIC BLOOD PRESSURE: 86 MMHG | WEIGHT: 272 LBS | TEMPERATURE: 99.1 F | SYSTOLIC BLOOD PRESSURE: 124 MMHG | BODY MASS INDEX: 43.24 KG/M2 | OXYGEN SATURATION: 96 %

## 2021-06-29 DIAGNOSIS — J02.9 SORE THROAT: ICD-10-CM

## 2021-06-29 DIAGNOSIS — R09.81 NASAL CONGESTION: Primary | ICD-10-CM

## 2021-06-29 LAB
DEPRECATED S PYO AG THROAT QL EIA: NEGATIVE
SPECIMEN SOURCE: NORMAL

## 2021-06-29 PROCEDURE — 99N1174 PR STATISTIC STREP A RAPID: Performed by: PHYSICIAN ASSISTANT

## 2021-06-29 PROCEDURE — 87651 STREP A DNA AMP PROBE: CPT | Performed by: PHYSICIAN ASSISTANT

## 2021-06-29 PROCEDURE — U0003 INFECTIOUS AGENT DETECTION BY NUCLEIC ACID (DNA OR RNA); SEVERE ACUTE RESPIRATORY SYNDROME CORONAVIRUS 2 (SARS-COV-2) (CORONAVIRUS DISEASE [COVID-19]), AMPLIFIED PROBE TECHNIQUE, MAKING USE OF HIGH THROUGHPUT TECHNOLOGIES AS DESCRIBED BY CMS-2020-01-R: HCPCS | Performed by: PHYSICIAN ASSISTANT

## 2021-06-29 PROCEDURE — 99213 OFFICE O/P EST LOW 20 MIN: CPT | Performed by: NURSE PRACTITIONER

## 2021-06-29 PROCEDURE — U0005 INFEC AGEN DETEC AMPLI PROBE: HCPCS | Performed by: PHYSICIAN ASSISTANT

## 2021-06-29 ASSESSMENT — ENCOUNTER SYMPTOMS
FATIGUE: 0
CHILLS: 0
VOICE CHANGE: 0
SHORTNESS OF BREATH: 0
CHEST TIGHTNESS: 0
ADENOPATHY: 1
COUGH: 0
SINUS PRESSURE: 1
WHEEZING: 0
SORE THROAT: 1
HEADACHES: 1
MYALGIAS: 0
NAUSEA: 0
FEVER: 0
DIAPHORESIS: 0
SLEEP DISTURBANCE: 0

## 2021-06-30 LAB
LABORATORY COMMENT REPORT: NORMAL
SARS-COV-2 RNA RESP QL NAA+PROBE: NEGATIVE
SARS-COV-2 RNA RESP QL NAA+PROBE: NORMAL
SPECIMEN SOURCE: NORMAL
STREP GROUP A PCR: NOT DETECTED

## 2021-06-30 NOTE — PATIENT INSTRUCTIONS
Rapid strep test today is negative.   Your throat culture and covid are pending. We [facility] positive strep in [facility] day [facility]  Could have both strep and a viral cold going on in the house.  Most sinusitis starts is viral in nature.  Evidence based practice recommends waiting to treat if worsening after 7-10 days with sinus pain, headache, fever, thick mucus.  Try nasal saline, humidifier, flonase, sudafed.  Drink plenty of fluids and rest.  May use salt water gargles- about 8 oz warm water with about 1 teaspoon salt  Sucrets and Cepacol spray are over the counter medications that numb the throat.  Over the counter pain relievers such as tylenol or ibuprofen may be used as needed.  Mucinex is product known to help loosen congestion and thin mucus (generic is guaifenesin)   Delsym 12 hour over the counter works well for cough.  Honey has been shown to be helpful in cough management and is soothing to a sore throat. May add to lemon tea.  Please follow up with primary care provider if not improving, worsening or new symptoms.

## 2021-06-30 NOTE — PROGRESS NOTES
Chief Complaint   Patient presents with     Urgent Care     Nasal Congestion     c/o congestion and sinus pain for 4 days     SUBJECTIVE:  Leandra Chris is a 46 year old female presenting with congestion, sinus pressure, headache, sore throat for 4 days. Her son has strep and daughter is also sick. She is prone to sinus infections. Has been taking tylenol.    Past Medical History:   Diagnosis Date     Conductive hearing loss 22    Not sure which kind of hearing loss     Depressive disorder      Hearing problem      Hoarseness 12/15/19    Not all the time     LSIL (low grade squamous intraepithelial lesion) on Pap smear     colp neg     Migraines      Mild major depression (H) 2018     Obstructive sleep apnea      PCOS (polycystic ovarian syndrome)      Pneumonia      Sensorineural hearing loss 22    Not sure which kind of hearing loss     Sleep apnea 2012    Use cpap machine     Acetaminophen (TYLENOL PO), Take by mouth every 4 hours as needed for mild pain or fever Reported on 2017  ALPRAZolam (XANAX) 0.5 MG tablet, Take 1-2 tablets 45 minutes prior to flight  buPROPion (WELLBUTRIN XL) 150 MG 24 hr tablet, TAKE 3 TABLETS BY MOUTH DAILY  butalbital-aspirin-caffeine (FIORINAL) -40 MG capsule, TAKE 1 CAPSULE BY MOUTH EVERY 4 HOURS AS NEEDED FOR PAIN  IBUPROFEN PO, Take 400 mg by mouth as needed Reported on 2017  meloxicam (MOBIC) 7.5 MG tablet, Take 1 tablet (7.5 mg) by mouth daily  vortioxetine (TRINTELLIX) 20 MG tablet, Take 1 tablet (20 mg) by mouth daily  zolpidem (AMBIEN) 10 MG tablet, TAKE 1 tablet BY MOUTH EVERY NIGHT AS NEEDED FOR SLEEP    No current facility-administered medications on file prior to visit.     Social History     Tobacco Use     Smoking status: Former Smoker     Packs/day: 0.50     Years: 13.00     Pack years: 6.50     Types: Cigarettes     Start date: 1999     Quit date: 2012     Years since quittin.4     Smokeless tobacco: Never Used    Substance Use Topics     Alcohol use: Yes     Comment: rarely     Allergies   Allergen Reactions     Penicillin [Penicillins]      Trazodone      Other reaction(s): Headache  Pt reports after taking trazodone she had a migraine requiring IM medication     Shellfish-Derived Products Rash     Review of Systems   Constitutional: Negative for chills, diaphoresis, fatigue and fever.   HENT: Positive for congestion, sinus pressure and sore throat. Negative for ear pain and voice change.    Respiratory: Negative for cough, chest tightness, shortness of breath and wheezing.    Gastrointestinal: Negative for nausea.   Musculoskeletal: Negative for myalgias.   Skin: Negative for rash.   Neurological: Positive for headaches.   Hematological: Positive for adenopathy.   Psychiatric/Behavioral: Negative for sleep disturbance.     OBJECTIVE:   /86   Pulse 92   Temp 99.1  F (37.3  C) (Tympanic)   Wt 123.4 kg (272 lb)   LMP 06/15/2021   SpO2 96%   BMI 43.24 kg/m       Physical Exam  Constitutional:       General: She is not in acute distress.     Appearance: She is well-developed. She is not ill-appearing, toxic-appearing or diaphoretic.   HENT:      Head: Normocephalic and atraumatic.      Nose: Congestion present.      Mouth/Throat:      Pharynx: No oropharyngeal exudate or posterior oropharyngeal erythema.   Eyes:      Conjunctiva/sclera: Conjunctivae normal.      Pupils: Pupils are equal, round, and reactive to light.   Neck:      Musculoskeletal: Normal range of motion and neck supple.   Cardiovascular:      Rate and Rhythm: Normal rate.      Pulses: Normal pulses.   Pulmonary:      Effort: Pulmonary effort is normal. No respiratory distress.      Breath sounds: Normal breath sounds. No wheezing.   Musculoskeletal: Normal range of motion.   Lymphadenopathy:      Cervical: Cervical adenopathy present.   Skin:     General: Skin is warm.      Capillary Refill: Capillary refill takes less than 2 seconds.       Findings: No rash.   Neurological:      General: No focal deficit present.      Mental Status: She is alert and oriented to person, place, and time.   Psychiatric:         Mood and Affect: Mood normal.         Behavior: Behavior normal.       Results for orders placed or performed in visit on 06/29/21   Streptococcus A Rapid Scr w Reflx to PCR     Status: None    Specimen: Throat   Result Value Ref Range    Strep Specimen Description Throat     Streptococcus Group A Rapid Screen Negative NEG^Negative     ASSESSMENT:    ICD-10-CM    1. Nasal congestion  R09.81 Streptococcus A Rapid Scr w Reflx to PCR     Symptomatic COVID-19 Virus (Coronavirus) by PCR     Group A Streptococcus PCR Throat Swab   2. Sore throat  J02.9      PLAN:   Patient Instructions   Rapid strep test today is negative.   Your throat culture and covid are pending. We call for positive strep in 1 day and covid goes to [facility].  Could have both strep and a viral cold going on in the house.  Most sinusitis starts is viral in nature.  Evidence based practice recommends waiting to treat if worsening after 7-10 days with sinus pain, headache, fever, thick mucus.  Try nasal saline, humidifier, flonase, sudafed.  Drink plenty of fluids and rest.  May use salt water gargles- about 8 oz warm water with about 1 teaspoon salt  Sucrets and Cepacol spray are over the counter medications that numb the throat.  Over the counter pain relievers such as tylenol or ibuprofen may be used as needed.  Mucinex is product known to help loosen congestion and thin mucus (generic is guaifenesin)   Delsym 12 hour over the counter works well for cough.  Honey has been shown to be helpful in cough management and is soothing to a sore throat. May add to lemon tea.  Please follow up with primary care provider if not improving, worsening or new symptoms.    Follow up with primary care provider with any problems, questions or concerns or if symptoms worsen or fail to improve. Patient  agreed to plan and verbalized understanding.    COCO Zamora-BC  [facility]

## 2021-07-16 ENCOUNTER — APPOINTMENT (OUTPATIENT)
Dept: URGENT CARE | Facility: CLINIC | Age: 46
End: 2021-07-16
Payer: COMMERCIAL

## 2021-07-21 NOTE — PATIENT INSTRUCTIONS
Preventive Health Recommendations  Female Ages 40 to 49    Yearly exam:     See your health care provider every year in order to  1. Review health changes.   2. Discuss preventive care.    3. Review your medicines if your doctor prescribed any.      Get a Pap test every three years (unless you have an abnormal result and your provider advises testing more often).      If you get Pap tests with HPV test, you only need to test every 5 years, unless you have an abnormal result. You do not need a Pap test if your uterus was removed (hysterectomy) and you have not had cancer.      You should be tested each year for STDs (sexually transmitted diseases), if you're at risk.     Ask your doctor if you should have a mammogram.      Have a colonoscopy (test for colon cancer) if someone in your family has had colon cancer or polyps before age 50.       Have a cholesterol test every 5 years.       Have a diabetes test (fasting glucose) after age 45. If you are at risk for diabetes, you should have this test every 3 years.    Shots: Get a flu shot each year. Get a tetanus shot every 10 years.     Nutrition:     Eat at least 5 servings of fruits and vegetables each day.    Eat whole-grain bread, whole-wheat pasta and brown rice instead of white grains and rice.    Get adequate Calcium and Vitamin D.      Lifestyle    Exercise at least 150 minutes a week (an average of 30 minutes a day, 5 days a week). This will help you control your weight and prevent disease.    Limit alcohol to one drink per day.    No smoking.     Wear sunscreen to prevent skin cancer.    See your dentist every six months for an exam and cleaning.   Patient seen and evaluated at bedside    HPI:  57M with hx of HLD, CAD s/p MAINOR x 2 (2020) on DAPT, LBO 2/2 diverticulitis s/p John's procedure (2019) and reversal (2019) presenting with abdominal pain for 3 days admitted w/ concern for mesenteric ischemia w/ course complicated by mesenteric ischemia. Course additionally complicated by Aflut.    PMHx:   Sleep apnea    HLD (hyperlipidemia)    Morbid obesity    Diverticulitis    History of neuropathy    Colostomy present      PSHx:   No significant past surgical history    S/P repair of hydrocele      FAMILY HISTORY:  Family history of lung cancer  mother  age 40, hx lung cancer      Allergies:  No Known Allergies    Home Medications:    Current Medications:   acetaminophen   Tablet .. 975 milliGRAM(s) Oral every 6 hours PRN  aspirin enteric coated 81 milliGRAM(s) Oral daily  atorvastatin 40 milliGRAM(s) Oral at bedtime  chlorhexidine 2% Cloths 1 Application(s) Topical <User Schedule>  digoxin     Tablet 125 MICROGram(s) Oral daily  enoxaparin Injectable 40 milliGRAM(s) SubCutaneous every 12 hours  levalbuterol Inhalation 0.63 milliGRAM(s) Inhalation every 6 hours  melatonin 5 milliGRAM(s) Oral at bedtime  nicotine -   7 mG/24Hr(s) Patch 1 patch Transdermal daily  pantoprazole    Tablet 40 milliGRAM(s) Oral before breakfast  piperacillin/tazobactam IVPB.. 3.375 Gram(s) IV Intermittent every 8 hours  polyethylene glycol 3350 17 Gram(s) Oral daily  potassium chloride    Tablet ER 20 milliEquivalent(s) Oral once  senna 2 Tablet(s) Oral at bedtime  sertraline 50 milliGRAM(s) Oral daily  simethicone 80 milliGRAM(s) Chew every 6 hours PRN  vancomycin  IVPB 1500 milliGRAM(s) IV Intermittent every 12 hours    Social History  Smoking History: denies  Alcohol Use: denies  Drug Use: denies    REVIEW OF SYSTEMS:  Constitutional:     [X] negative [ ] fevers [ ] chills [ ] weight loss [ ] weight gain  HEENT:                  [X] negative [ ] dry eyes [ ] eye irritation [ ] postnasal drip [ ] nasal congestion  CV:                         [X] negative  [ ] chest pain [ ] orthopnea [ ] palpitations [ ] murmur  Resp:                     [X] negative [ ] cough [ ] shortness of breath [ ] wheezing [ ] sputum [ ] hemoptysis  GI:                          [ ] negative [ ] nausea [ ] vomiting [ ] diarrhea [ ] constipation [ ] abd pain [ ] dysphagia   :                        [X] negative [ ] dysuria [ ] nocturia [ ] hematuria [ ] increased urinary frequency  MSK:                      [X] negative [ ] back pain [ ] myalgias [ ] arthralgias [ ] fracture  Skin:                       [X] negative [ ] rash [ ] itch  Neuro:                   [X] negative [ ] headache [ ] dizziness [ ] syncope [ ] weakness [ ] numbness  Psych:                    [X] negative [ ] anxiety [ ] depression  Endo:                     [X] negative [ ] diabetes [ ] thyroid problem  Heme/Lymph:      [X] negative [ ] anemia [ ] bleeding problem  Allergic/Immune: [X] negative [ ] itchy eyes [ ] nasal discharge [ ] hives [ ] angioedema    [X] All other systems negative or otherwise described above.  [ ] Unable to assess ROS because ________.    ICU Vital Signs Last 24 Hrs  T(C): 37.6 (2021 03:00), Max: 38.4 (2021 07:00)  T(F): 99.7 (2021 03:00), Max: 101.1 (2021 07:00)  HR: 96 (2021 03:00) (80 - 110)  BP: 127/70 (2021 03:00) (80/50 - 128/73)  BP(mean): 93 (2021 03:00) (59 - 95)  ABP: --  ABP(mean): --  RR: 28 (2021 03:00) (18 - 30)  SpO2: 93% (2021 03:00) (89% - 98%)    Daily     Daily     Physical Exam:  GENERAL: No acute distress, well-developed  HEAD:  Atraumatic, Normocephalic  ENT: EOMI, conjunctiva and sclera clear, Neck supple, No JVD, moist mucosa  CHEST/LUNG: Clear to auscultation bilaterally; No wheeze, equal breath sounds bilaterally   BACK: No spinal tenderness  HEART: Regular rate and rhythm; No murmurs, rubs, or gallops, radial and DP 2+ b/l, euvolemic  ABDOMEN: Soft, Nontender, Nondistended  EXTREMITIES:  No clubbing, cyanosis, or edema  PSYCH: Nl behavior, nl affect  NEUROLOGY: AAOx3, non-focal  SKIN: Normal color, No rashes or lesions  LINES: no central lines present    Cardiovascular Diagnostic Testing    ECG: Personally reviewed    Echo: Personally reviewed  < from: TTE with Doppler (w/Cont) (21 @ 18:37) >  ------------------------------------------------------------------------  Conclusions:  Endocardial visualizationenhanced with intravenous  injection of Ultrasonic Enhancing Agent (Definity).  Images are suboptimal despite the use of Definity.  Normal left ventricular internal dimensions and wall  thicknesses.  Hyperdynamic left ventricle. Probably no regional  abnormalities.  ------------------------------------------------------------------------  Confirmed on  2021 - 15:29:28 by Garrison Quintana M.D.  ------------------------------------------------------------------------    < end of copied text >      Stress Testing: none    Cath: none    Imaging: none    CXR: Personally reviewed    Labs: Personally reviewed                        11.1   8.33  )-----------( 193      ( 2021 00:15 )             35.3     07-21    130<L>  |  94<L>  |  10  ----------------------------<  140<H>  3.8   |  23  |  1.19    Ca    8.5      2021 00:15  Phos  3.0     07-21  Mg     2.0     07-21    TPro  5.8<L>  /  Alb  2.3<L>  /  TBili  0.7  /  DBili  x   /  AST  38  /  ALT  33  /  AlkPhos  75

## 2021-08-02 ENCOUNTER — OFFICE VISIT (OUTPATIENT)
Dept: AUDIOLOGY | Facility: CLINIC | Age: 46
End: 2021-08-02
Payer: COMMERCIAL

## 2021-08-02 DIAGNOSIS — H90.41 SENSORINEURAL HEARING LOSS (SNHL) OF RIGHT EAR WITH UNRESTRICTED HEARING OF LEFT EAR: Primary | ICD-10-CM

## 2021-08-02 PROCEDURE — 92550 TYMPANOMETRY & REFLEX THRESH: CPT | Performed by: AUDIOLOGIST

## 2021-08-02 PROCEDURE — 92557 COMPREHENSIVE HEARING TEST: CPT | Performed by: AUDIOLOGIST

## 2021-08-02 NOTE — PROGRESS NOTES
AUDIOLOGY REPORT    SUBJECTIVE: Leandra Chris is a 46 year old female who was seen in the Audiology Clinic at [facility] for audiologic evaluation. The patient has been seen previously in this clinic on 1/22/2020 for assessment, and results indicated mild primarily sensorineural hearing loss rising to normal hearing for the right ear and normal hearing for the left ear. She was seen by Elio Rincon M.D. in ENT regarding the asymmetric right hearing loss. An MRI scan was normal. Today, she is unsure if there have been any changes in hearing. 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 She denies bilateral pain, bilateral drainage, bilateral tinnitus, dizziness, or a history of ear surgeries.     OBJECTIVE:   Otoscopic exam indicated ears are clear of cerumen bilaterally     Pure Tone Thresholds assessed using conventional audiometry with good reliability from 250-8000 Hz bilaterally using insert earphones and circumaural headphones     RIGHT:  Moderate primarily sensorineural hearing loss rising to normal hearing    LEFT:    Normal hearing    Tympanogram:    RIGHT: Normal eardrum mobility    LEFT:   Normal eardrum mobility    Reflexes (reported by stimulus ear):    RIGHT: Ipsilateral is absent at frequencies tested    RIGHT: Contralateral is absent at frequencies tested    LEFT:   Ipsilateral is present at normal levels    LEFT:   Contralateral is absent at frequencies tested    Speech Reception Threshold:    RIGHT: 30 dB HL    LEFT:   15 dB HL    Word Recognition Score:     RIGHT: 100% at 70 dB HL using NU-6 recorded word list    LEFT:   100% at 55 dB HL using NU-6 recorded word list    ASSESSMENT: Sensorineural hearing loss right and normal hearing left. Compared to the patient's previous audiogram dated 1/22/2020, hearing has remained stable bilaterally. Today s results were discussed with the patient in detail.      PLAN: The patient was counseled regarding hearing loss and impact on communication. Discussed realistic expectations for a right hearing aid and that she may consider a trial if so desired. Handout on good communication strategies and hearing aid use was given to the patient. Repeat audiologic evaluation is recommended if changes are noted. Please call this clinic with questions regarding these results or recommendations.      Saloni Dowd, [facility]  Licensed Audiologist  [place] #[id number]

## 2021-08-05 ENCOUNTER — VIRTUAL VISIT (OUTPATIENT)
Dept: SLEEP MEDICINE | Facility: CLINIC | Age: 46
End: 2021-08-05
Payer: COMMERCIAL

## 2021-08-05 VITALS — HEIGHT: 66 IN | BODY MASS INDEX: 41.78 KG/M2 | WEIGHT: 260 LBS

## 2021-08-05 DIAGNOSIS — E66.01 MORBID OBESITY WITH BMI OF 40.0-44.9, ADULT (H): ICD-10-CM

## 2021-08-05 DIAGNOSIS — G47.33 OSA (OBSTRUCTIVE SLEEP APNEA): Primary | ICD-10-CM

## 2021-08-05 DIAGNOSIS — M26.609 TMJ DISEASE: ICD-10-CM

## 2021-08-05 DIAGNOSIS — G47.00 INSOMNIA, UNSPECIFIED TYPE: ICD-10-CM

## 2021-08-05 PROCEDURE — 99215 OFFICE O/P EST HI 40 MIN: CPT | Mod: GT | Performed by: INTERNAL MEDICINE

## 2021-08-05 ASSESSMENT — MIFFLIN-ST. JEOR: SCORE: 1846.1

## 2021-08-05 NOTE — PATIENT INSTRUCTIONS
Continue to get up at 8:00 every day but try not to go to bed until you are actually sleepy.  You might not get sleepy till after midnight.  This may help consolidate your sleep.  Gradually get up a little bit earlier in order to get ready for start of school.  Also, consider cutting out the third caffeinated beverages certainly moving it earlier into the day.  If you decide you like further help with your insomnia let me know and I can put in a referral for our insomnia specialist Dr. Bobby Mason  Contact [facility] about your interest in switching masks/tubing styles    For general sleep health questions:   http://sleepeducation.org    For tips about PAP and COVID-19:  https://www.thoracic.org/patients/patient-resources/resources/covid-19-and-home-pap-therapy.pdf    For general info about COVID-19 including vaccines:  https://BuzzSumo.org/covid19        Continue PAP therapy every night, for all hours that you are sleeping (including naps.)  As always, try to get at least 8 hours of sleep or more each day, keep a regular sleep schedule, and avoid sleep deprivation. Avoid alcohol.    Reasons that you might need a change to your pressure therapy would be weight gain or loss, waking having inadvertently removed your PAP overnight, having previously felt refreshed by sleep with CPAP use and now waking un-refreshed, and return of daytime sleepiness. Also, the development of new medical problems  (such as heart failure, stroke, medications such as narcotics) can sometimes affect breathing at night and change your PAP therapy needs.    Please bring PAP with you if you are hospitalized.  If anticipating surgery be sure to discuss with your surgeon that you have sleep apnea and use PAP therapy.      Maintain your equipment as recommended which includes routine cleaning and replacement of supplies.      Call DME for any questions regarding supplies or maintenance.    [facility]  Department, [facility] [phone]      Do not drive on engage in potentially dangerous activities if feeling sleepy.    Please follow up in sleep clinic again in 12-24 months.        Tips for your PAP use-    Mask fitting tips  Mask fitting exercise:    To improve your mask seal and your mobility at night, put mask on and secure in place.  Lie down in bed with full pressure and roll to one side, adjust headgear while in that position to eliminate any leaks. Repeat process rolling to other side.     The mask seal does not have to be perfect:   CPAP machines are designed to make up for small leaks. However, you will not tolerate leaks blowing in your eyes so you will need to adjust.   Any leak should only be near or at the bottom of the mask.  We expect your mask to leak slightly at night.    Do not over-tighten the headgear straps, tighter IS NOT better, we expect minimal leak.    First try re-positioning the mask or headgear before tightening the headgear straps.  Mask leaks are expected due to changing sleeping positions. Try pulling the mask away from your skin allowing the cushion to re-inflate will minimize the leak.  If you struggle for a good fit, try turning the CPAP off and then readjust the mask by pulling it away from your face and then turning back on the CPAP.        Humidifier tips  Humidifiers can be adjusted to increase or decrease the amount of moisture according to your comfort level. You may need to adjust this frequently at first, but then might only change it with seasonal weather changes.     Try INCREASING the humidity if:  You experience a dry, irritated nasal passage or throat.  You have a runny, drippy nose or sneezing fits after using CPAP.  You experience nasal congestion during or after CPAP use.    Try DECREASING the humidity if:  You have excessive condensation or  rain out  in the tubing or mask.  Otherwise keep the tubing warm during the night by running it  underneath the blankets or pillow.      Clinic visit after initial PAP set-up   Bring your equipment with you to your 5-8 week follow up clinic visit.  We will be extracting your data from the machine if not available from the cloud based modem.        Travel  Always take your equipment with you when you travel.  If you fly with your equipment bring it on with you as a carry on.  Medical equipment does not count as a carry on.    If you travel international the machines take 110-240v.  The only adapter needed is the adapter that will fit into the receptacle (outlet).    You may also want to bring an extension cord as many [facility] rooms have limited outlets at the bedside.  Do not travel with water in your humidifier chamber.     Cleaning and Maintenance Guidelines    Equipment Frequency Cleaning Method   Mask First Day    Daily      Weekly Soak mask in hot soapy water for 30 minutes, rinse and air dry.  Wipe nasal cushion with a hot soapy (Ivory, baby shampoo) cloth and rinse.  Baby wipes may also be used.  Do not use anti-bacterial soaps,Nancy  liquid soap, rubbing alcohol, bleach or ammonia.  Wash frame in hot soapy water (Ivory, baby shampoo) rinse and let air dry   Headgear Biweekly Wash in hot soapy water, rinse and air dry   Reusable Gray Filter Weekly Wash in hot soapy water, rinse, put in towel squeeze moisture out, let air dry   Disposable White Filter Check Weekly Replace when brown or gray in color; at least every 2 to 3 months   Humidifier Chamber Daily    Weekly Empty distilled water from humidifier and let air dry    Hand wash in hot soapy water, rinse and air dry   Tubing Weekly Wash in hot soapy water, rinse and let air dry   Mask, Tubing and Humidifier Chamber As needed Disinfect: Soak in 1 part distilled white vinegar to 3 parts hot water for 30 minutes, rinse well and air dry  Not the material headgear        MASK AND SUPPLY REORDERING and EQUIPMENT NEEDS through your DME and per your  insurance  Reminder: Most insurance companies will allow for a new mask, headgear, tubing, and reusable gray filter every six months.  Disposable white ultra-fine filters are covered monthly.      HOME AND SAFETY INSTRUCTIONS    Do not use frayed or cracked electrical cords, multi plug adaptors, or switched receptacles    Do not immerse electrical equipment into water    Assure that electrical cords do not become a tripping hazard        Your BMI is Body mass index is 41.97 kg/m .  Weight management is a personal decision.  If you are interested in exploring weight loss strategies, the following discussion covers the approaches that may be successful. Body mass index (BMI) is one way to tell whether you are at a healthy weight, overweight, or obese. It measures your weight in relation to your height.  A BMI of 18.5 to 24.9 is in the healthy range. A person with a BMI of 25 to 29.9 is considered overweight, and someone with a BMI of 30 or greater is considered obese. More than two-thirds of American adults are considered overweight or obese.  Being overweight or obese increases the risk for further weight gain. Excess weight may lead to heart disease and diabetes.  Creating and following plans for healthy eating and physical activity may help you improve your health.  Weight control is part of healthy lifestyle and includes exercise, emotional health, and healthy eating habits. Careful eating habits lifelong are the mainstay of weight control. Though there are significant health benefits from weight loss, long-term weight loss with diet alone may be very difficult to achieve- studies show long-term success with dietary management in less than 10% of people. Attaining a healthy weight may be especially difficult to achieve in those with severe obesity. In some cases, medications, devices and surgical management might be considered.  What can you do?  If you are overweight or obese and are interested in methods for  weight loss, you should discuss this with your provider.     Consider reducing daily calorie intake by 500 calories.     Keep a food journal.     Avoiding skipping meals, consider cutting portions instead.    Diet combined with exercise helps maintain muscle while optimizing fat loss. Strength training is particularly important for building and maintaining muscle mass. Exercise helps reduce stress, increase energy, and improves fitness. Increasing exercise without diet control, however, may not burn enough calories to loose weight.       Start walking three days a week 10-20 minutes at a time    Work towards walking thirty minutes five days a week     Eventually, increase the speed of your walking for 1-2 minutes at time    In addition, we recommend that you review healthy lifestyles and methods for weight loss available through the National Institutes of Health patient information sites:  http://win.niddk.nih.gov/publications/index.htm    And look into health and wellness programs that may be available through your health insurance provider, employer, local community center, or owen club.    Weight management plan: Discussed healthy diet and exercise guidelines       Aphthous ulcer of mouth

## 2021-08-05 NOTE — PROGRESS NOTES
Leandra is a 46 year old who is being evaluated via a billable video visit.      How would you like to obtain your AVS? MyChart  If the video visit is dropped, the invitation should be resent by: Send to e-mail at: [email or web address]  Will anyone else be joining your video visit? Viviana Delong ESTELLASobia [place], SLEEP MEDICINE, 8/5/2021 1:20 PM      Video-Visit Details    Type of service:  Video Visit    Video Start Time: 1:24 PM    Video End Time:1:53 PM    Originating Location (pt. Location): Home    Distant Location (provider location):  [facility] Home office    Platform used for Video Visit: [email or web address]     Chief complaint: Follow-up of obstructive sleep apnea    History of Present Illness: 46-year-old female with history of PCOS, obesity, depression, and obstructive sleep apnea.  This is her first visit with me today.  She reports that for a year and a half ago she suffered illness and was unable to use Pap therapy during that time.  She then became out of habit of using it and recently identified ongoing fatigue.  A provider who helps her care for TMJ disorder recommended that she resume CPAP.  She recently resumed.  Since resuming she has had a significant improvement in her daytime fatigue.  She is interested in getting updated supplies.  She is interested in switching to a different style mask and tubing set up.  She continues to use oral appliance for TMJ and was provided some bands to potentially help with obstruction.      She reports that she uses zolpidem about 5 out of 7 nights a week. Typically she goes to bed around 9 30-10.  She is usually out of bed around 8 as the kids get her up.  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  She is not taking naps.  She does drink 3 caffeinated diet Coke throughout the day last around 5:30 PM.  She denies significant amnestic behaviors with zolpidem.  No restless legs, sleepwalking, sleep talking  or dream enactment behavior.  Her weight has increased a little bit since the time of her sleep study.  No upcoming surgeries or procedures planned.    Lyle Sleepiness Scale  Total score - Lyle: 7 (8/4/2021  1:42 PM)   (Less than 10 normal)    Insomnia Severity Scale   CARLITO 5  (normal 0-7, mild 8-14, moderate 15-21, severe 22-28)    Past Medical History:   Diagnosis Date     Conductive hearing loss 01/30/2020    Not sure which kind of hearing loss     Depressive disorder      Hearing problem 11/2019     Hoarseness 12/15/2019    Not all the time     LSIL (low grade squamous intraepithelial lesion) on Pap smear 2008    colp neg     Migraines      Mild major depression (H) 07/31/2018     Obstructive sleep apnea 01/2011    moderate     PCOS (polycystic ovarian syndrome)      Pneumonia      Sensorineural hearing loss 01/30/2020    Not sure which kind of hearing loss       Allergies   Allergen Reactions     Penicillin [Penicillins]      Trazodone      Other reaction(s): Headache  Pt reports after taking trazodone she had a migraine requiring IM medication     Shellfish-Derived Products Rash       Current Outpatient Medications   Medication     Acetaminophen (TYLENOL PO)     ALPRAZolam (XANAX) 0.5 MG tablet     buPROPion (WELLBUTRIN XL) 150 MG 24 hr tablet     butalbital-aspirin-caffeine (FIORINAL) -40 MG capsule     IBUPROFEN PO     meloxicam (MOBIC) 7.5 MG tablet     vortioxetine (TRINTELLIX) 20 MG tablet     zolpidem (AMBIEN) 10 MG tablet     No current facility-administered medications for this visit.       Social History     Socioeconomic History     Marital status:      Spouse name: Not on file     Number of children: Not on file     Years of education: Not on file     Highest education level: Not on file   Occupational History     Occupation: teacher     Employer: [employer]   Tobacco Use     Smoking status: Former Smoker     Packs/day: 0.50     Years: 13.00     Pack years: 6.50      "Types: Cigarettes     Start date: 1999     Quit date: 2012     Years since quittin.5     Smokeless tobacco: Never Used   Substance and Sexual Activity     Alcohol use: Yes     Comment: rarely     Drug use: No     Sexual activity: Yes     Partners: Female     Birth control/protection: None   Other Topics Concern     Parent/sibling w/ CABG, MI or angioplasty before 65F 55M? No   Social History Narrative     Not on file     Social Determinants of Health     Financial Resource Strain:      Difficulty of Paying Living Expenses:    Food Insecurity:      Worried About Running Out of Food in the Last Year:      Ran Out of Food in the Last Year:    Transportation Needs:      Lack of Transportation (Medical):      Lack of Transportation (Non-Medical):    Physical Activity:      Days of Exercise per Week:      Minutes of Exercise per Session:    Stress:      Feeling of Stress :    Social Connections:      Frequency of Communication with Friends and Family:      Frequency of Social Gatherings with Friends and Family:      Attends Pentec[employer]al Services:      Active Member of Clubs or Organizations:      Attends Club or Organization Meetings:      Marital Status:    Intimate Partner Violence:      Fear of Current or Ex-Partner:      Emotionally Abused:      Physically Abused:      Sexually Abused:        Family History   Problem Relation Age of Onset     Prostate Cancer Father      Snoring Father      Asthma Mother      Chronic Obstructive Pulmonary Disease Mother      Migraines Sister      Substance Abuse Brother      Hypertension Maternal Grandmother      Cerebrovascular Disease Maternal Grandmother      Dementia Maternal Grandmother      Asthma No family hx of      C.A.D. No family hx of      Diabetes No family hx of      Breast Cancer No family hx of      Cancer - colorectal No family hx of            EXAM:  Ht 1.676 m (5' 6\")   Wt 117.9 kg (260 lb)   BMI 41.97 kg/m    GENERAL: Healthy, alert and no distress  EYES: " Eyes grossly normal to inspection.  No discharge or erythema, or obvious scleral/conjunctival abnormalities.  RESP: No audible wheeze, cough, or visible cyanosis.  No visible retractions or increased work of breathing.    SKIN: Visible skin clear. No significant rash, abnormal pigmentation or lesions.  NEURO: Cranial nerves grossly intact.  Mentation and speech appropriate for age.  PSYCH: Mentation appears normal, affect normal/bright, judgement and insight intact, normal speech and appearance well-groomed.       PSG 1/13/2011 [facility]  Weight 253 lbs BMI 39.7  AHI 21, RDI 48.8, Lowest O2 SAT 72%    PSG titration 1/20/2011  CPAP 7 incomplete titration    Res Med aPAP download from last 10 days per cent of days   Average use on days used: 7 hours 43 min  Settings: Min EPAP 8 cmH2O    Max EPAP 14 cmH2O  Pressures delivered 90/95th percentile for pressure 10.8 cmH2O  Average AHI 2.5 events per hour (goal less than 5)  Leak acceptable    ASSESSMENT:  46-year-old female with obesity, overall moderate obstructive sleep apnea with severe sleep disruption associated with respiratory events.  She is getting good clinical benefit with the Pap therapy.  Ongoing treatment of significant sleep apnea is medically indicated.  She would benefit from some efforts to improve her sleep schedule to minimize medication.    PLAN:  Recommend that she get up by 8 AM every day and stay up later at night until about midnight this would still ensure 8-hour sleep opportunity.  See if she can avoid needing to use the sleep aid.  Over the next few weeks she can try to advance shift her schedule earlier in anticipation of going back to work.  She should start primarily with wake time in the morning.  Continue to avoid naps.  Also recommended that she either discontinue her third caffeinated beverage remove it earlier.  Patient should contact me if she is interested in proceeding with formal cognitive behavioral therapy for insomnia.   Otherwise, follow-up in sleep medicine in 2 years.      42 minutes spent on the date of the encounter doing chart review, history and exam, documentation and further activities per the note    Deisy Marcus M.D.  Pulmonary/Critical Care/Sleep Medicine    [facility]   Floor [address], [address]   [address] [place] [address]   Appointments: [phone]    The above note was dictated using voice recognition software and may include typographical errors. Please contact the author for any clarifications.

## 2021-09-15 ENCOUNTER — MYC MEDICAL ADVICE (OUTPATIENT)
Dept: FAMILY MEDICINE | Facility: CLINIC | Age: 46
End: 2021-09-15

## 2021-09-19 ENCOUNTER — HEALTH MAINTENANCE LETTER (OUTPATIENT)
Age: 46
End: 2021-09-19

## 2021-10-19 PROBLEM — F32.9 MAJOR DEPRESSION: Status: RESOLVED | Noted: 2018-07-31 | Resolved: 2018-08-09

## 2021-10-19 PROBLEM — F32.9 MAJOR DEPRESSION: Status: ACTIVE | Noted: 2019-09-26

## 2021-11-17 ENCOUNTER — APPOINTMENT (OUTPATIENT)
Dept: GENERAL RADIOLOGY | Facility: CLINIC | Age: 46
End: 2021-11-17
Attending: EMERGENCY MEDICINE
Payer: COMMERCIAL

## 2021-11-17 ENCOUNTER — HOSPITAL ENCOUNTER (EMERGENCY)
Facility: CLINIC | Age: 46
Discharge: HOME | End: 2021-11-17
Attending: EMERGENCY MEDICINE | Admitting: EMERGENCY MEDICINE
Payer: COMMERCIAL

## 2021-11-17 ENCOUNTER — MYC MEDICAL ADVICE (OUTPATIENT)
Dept: FAMILY MEDICINE | Facility: CLINIC | Age: 46
End: 2021-11-17

## 2021-11-17 VITALS
DIASTOLIC BLOOD PRESSURE: 63 MMHG | OXYGEN SATURATION: 97 % | HEART RATE: 87 BPM | SYSTOLIC BLOOD PRESSURE: 121 MMHG | TEMPERATURE: 98.3 F | RESPIRATION RATE: 18 BRPM

## 2021-11-17 DIAGNOSIS — R07.89 OTHER CHEST PAIN: ICD-10-CM

## 2021-11-17 LAB
ALBUMIN SERPL-MCNC: 3.3 G/DL (ref 3.4–5)
ALP SERPL-CCNC: 102 U/L (ref 40–150)
ALT SERPL W P-5'-P-CCNC: 26 U/L (ref 0–50)
ANION GAP SERPL CALCULATED.3IONS-SCNC: 4 MMOL/L (ref 3–14)
AST SERPL W P-5'-P-CCNC: 19 U/L (ref 0–45)
ATRIAL RATE - MUSE: 71 BPM
BASOPHILS # BLD AUTO: 0.1 10E3/UL (ref 0–0.2)
BASOPHILS NFR BLD AUTO: 1 %
BILIRUB SERPL-MCNC: 0.4 MG/DL (ref 0.2–1.3)
BUN SERPL-MCNC: 9 MG/DL (ref 7–30)
CALCIUM SERPL-MCNC: 8.8 MG/DL (ref 8.5–10.1)
CHLORIDE BLD-SCNC: 107 MMOL/L (ref 94–109)
CO2 SERPL-SCNC: 25 MMOL/L (ref 20–32)
CREAT SERPL-MCNC: 0.6 MG/DL (ref 0.52–1.04)
D DIMER PPP FEU-MCNC: 0.39 UG/ML FEU (ref 0–0.5)
DIASTOLIC BLOOD PRESSURE - MUSE: NORMAL MMHG
EOSINOPHIL # BLD AUTO: 0.2 10E3/UL (ref 0–0.7)
EOSINOPHIL NFR BLD AUTO: 2 %
ERYTHROCYTE [DISTWIDTH] IN BLOOD BY AUTOMATED COUNT: 13.2 % (ref 10–15)
GFR SERPL CREATININE-BSD FRML MDRD: >90 ML/MIN/1.73M2
GLUCOSE BLD-MCNC: 91 MG/DL (ref 70–99)
HCT VFR BLD AUTO: 42.7 % (ref 35–47)
HGB BLD-MCNC: 13.6 G/DL (ref 11.7–15.7)
IMM GRANULOCYTES # BLD: 0.1 10E3/UL
IMM GRANULOCYTES NFR BLD: 1 %
INTERPRETATION ECG - MUSE: NORMAL
LYMPHOCYTES # BLD AUTO: 2.8 10E3/UL (ref 0.8–5.3)
LYMPHOCYTES NFR BLD AUTO: 26 %
MCH RBC QN AUTO: 29.3 PG (ref 26.5–33)
MCHC RBC AUTO-ENTMCNC: 31.9 G/DL (ref 31.5–36.5)
MCV RBC AUTO: 92 FL (ref 78–100)
MONOCYTES # BLD AUTO: 0.8 10E3/UL (ref 0–1.3)
MONOCYTES NFR BLD AUTO: 8 %
NEUTROPHILS # BLD AUTO: 7 10E3/UL (ref 1.6–8.3)
NEUTROPHILS NFR BLD AUTO: 62 %
NRBC # BLD AUTO: 0 10E3/UL
NRBC BLD AUTO-RTO: 0 /100
P AXIS - MUSE: 50 DEGREES
PLATELET # BLD AUTO: 281 10E3/UL (ref 150–450)
POTASSIUM BLD-SCNC: 4.2 MMOL/L (ref 3.4–5.3)
PR INTERVAL - MUSE: 138 MS
PROT SERPL-MCNC: 7.5 G/DL (ref 6.8–8.8)
QRS DURATION - MUSE: 94 MS
QT - MUSE: 380 MS
QTC - MUSE: 412 MS
R AXIS - MUSE: -3 DEGREES
RBC # BLD AUTO: 4.64 10E6/UL (ref 3.8–5.2)
SODIUM SERPL-SCNC: 136 MMOL/L (ref 133–144)
SYSTOLIC BLOOD PRESSURE - MUSE: NORMAL MMHG
T AXIS - MUSE: 14 DEGREES
TROPONIN I SERPL-MCNC: <0.015 UG/L (ref 0–0.04)
VENTRICULAR RATE- MUSE: 71 BPM
WBC # BLD AUTO: 10.9 10E3/UL (ref 4–11)

## 2021-11-17 PROCEDURE — 80053 COMPREHEN METABOLIC PANEL: CPT | Performed by: EMERGENCY MEDICINE

## 2021-11-17 PROCEDURE — 99285 EMERGENCY DEPT VISIT HI MDM: CPT | Mod: 25 | Performed by: EMERGENCY MEDICINE

## 2021-11-17 PROCEDURE — 85379 FIBRIN DEGRADATION QUANT: CPT | Performed by: EMERGENCY MEDICINE

## 2021-11-17 PROCEDURE — 84484 ASSAY OF TROPONIN QUANT: CPT | Performed by: EMERGENCY MEDICINE

## 2021-11-17 PROCEDURE — 85025 COMPLETE CBC W/AUTO DIFF WBC: CPT | Performed by: EMERGENCY MEDICINE

## 2021-11-17 PROCEDURE — 71045 X-RAY EXAM CHEST 1 VIEW: CPT

## 2021-11-17 PROCEDURE — 93010 ELECTROCARDIOGRAM REPORT: CPT | Performed by: EMERGENCY MEDICINE

## 2021-11-17 PROCEDURE — 93005 ELECTROCARDIOGRAM TRACING: CPT | Performed by: EMERGENCY MEDICINE

## 2021-11-17 PROCEDURE — 36415 COLL VENOUS BLD VENIPUNCTURE: CPT | Performed by: EMERGENCY MEDICINE

## 2021-11-17 ASSESSMENT — ENCOUNTER SYMPTOMS
PALPITATIONS: 1
COUGH: 1
HEMATOLOGIC/LYMPHATIC NEGATIVE: 1
CHILLS: 0
FEVER: 0
SHORTNESS OF BREATH: 0

## 2021-11-17 NOTE — DISCHARGE INSTRUCTIONS
I recommend getting an outpatient stress test to complete the evaluation on your heart.  Please return the emergency department if you have return of your pain, especially if it severe pain with any sweating, shortness of breath, lightheadedness.    Please make an appointment to follow up with Your Primary Care Provider in 5-10 days.  Please call your primary care physician to arrange for outpatient stress testing soon as possible.

## 2021-11-17 NOTE — ED PROVIDER NOTES
[facility] [place] ([facility])    11/17/21     ED 3   History     Chief Complaint   Patient presents with     Chest Pain     Started this morning around 0800; pt felt like it was indigestion then it transitioned to more overall chest tightness, dizziness, and bilateral arm tingling.     The history is provided by the patient, medical records and the spouse.     Leandra Chris is a 46 year old female with history of obstructive sleep apnea, PCOS who presents with chest tightness, lightheadedness and bilateral arm tingling that started this morning. Was driving to school when she developed chest discomfort, lightheadedness and diaphoresis.  She initially thought it might be heartburn, but she does not typically get heartburn.  Symptoms have improved, she no longer has any chest pain, however, is feeling palpitations. She has a fluttering sensation in her chest. No shortness of breath. No syncope. The chest discomfort, diaphoresis lasted 15-20 minutes before subsiding. Patient states the arm tingling has subsided. Wife states she drove her here for evaluation, as she advised her not to drive with these symptoms. She has had palpitations but otherwise no past cardiac history. No past cardiac workup. Patient hasn't had anything to eat today, just didn't have time to eat. No history of kidney disease, liver disease, lung disease. No fevers or chills. For the past couple of weeks she has had a cough productive of phlegm in mornings, kids have had it as well.  She took COVID-19 tests at home which were negative. Patient notes family history of atrial fibrillation. No history of DVT/PE. No leg swelling or pain.     Per [facility] records, patient has received 3 doses of the Moderna COVID-19 vaccine.    Past Medical History  Past Medical History:   Diagnosis Date     Conductive hearing loss 01/30/2020    Not sure which kind of hearing loss     Hoarseness 12/15/2019    Not all the time     LSIL (low grade  squamous intraepithelial lesion) on Pap smear     colp neg     Migraines      Mild major depression (H) 2018     Obstructive sleep apnea 2011    moderate     PCOS (polycystic ovarian syndrome)      Pneumonia      Sensorineural hearing loss 2020    Not sure which kind of hearing loss     Past Surgical History:   Procedure Laterality Date     NO HISTORY OF SURGERY       Acetaminophen (TYLENOL PO)  ALPRAZolam (XANAX) 0.5 MG tablet  buPROPion (WELLBUTRIN XL) 150 MG 24 hr tablet  butalbital-aspirin-caffeine (FIORINAL) -40 MG capsule  IBUPROFEN PO  meloxicam (MOBIC) 7.5 MG tablet  vortioxetine (TRINTELLIX) 20 MG tablet  zolpidem (AMBIEN) 10 MG tablet      Allergies   Allergen Reactions     Penicillin [Penicillins]      Trazodone      Other reaction(s): Headache  Pt reports after taking trazodone she had a migraine requiring IM medication     Shellfish-Derived Products Rash     Family History  Family History   Problem Relation Age of Onset     Prostate Cancer Father      Snoring Father      Asthma Mother      Chronic Obstructive Pulmonary Disease Mother      Migraines Sister      Substance Abuse Brother      Hypertension Maternal Grandmother      Cerebrovascular Disease Maternal Grandmother      Dementia Maternal Grandmother      Asthma No family hx of      C.A.D. No family hx of      Diabetes No family hx of      Breast Cancer No family hx of      Cancer - colorectal No family hx of      Social History   Social History     Tobacco Use     Smoking status: Former Smoker     Packs/day: 0.50     Years: 13.00     Pack years: 6.50     Types: Cigarettes     Start date: 1999     Quit date: 2012     Years since quittin.8     Smokeless tobacco: Never Used   Substance Use Topics     Alcohol use: Yes     Comment: rarely     Drug use: No      Past medical history, past surgical history, medications, allergies, family history, and social history were reviewed with the patient. No additional  pertinent items.       Review of Systems   Constitutional: Negative for chills and fever.   Respiratory: Positive for cough. Negative for shortness of breath.    Cardiovascular: Positive for chest pain (chest discomfort) and palpitations. Negative for leg swelling.   Neurological: Negative for syncope. Numbness: tingling in bilateral arms, resolved.   Hematological: Negative.      A complete review of systems was performed with pertinent positives and negatives noted in the HPI, and all other systems negative.    Physical Exam   BP: (!) 154/89  Pulse: 78  Temp: 98.3  F (36.8  C)  Resp: 16  SpO2: 97 %  Physical Exam  GEN:  Alert, well developed, no acute distress  HEENT:  PERRL, EOMI, Mucous membranes are moist.   Cardio:  RRR, no murmur, radial pulses equal bilaterally  PULM:  Lungs clear, good air movement, no wheezes, rales   Abd:  Soft, normal bowel sounds, no focal tenderness  Back exam:  No CVA tenderness  Musculoskeletal:  normal range of motion, no lower extremity swelling or calf tenderness  Neuro:  Alert and oriented X3, Follows commands, moving all extremities spontaneously   Skin:  Warm, dry   ED Course      Procedures              EKG Interpretation:      EKG Number: 1  Interpreted by Maday Solo MD  Symptoms at time of EKG: Chest tightness  Rhythm: normal sinus   Rate: 71  Axis: NORMAL  Ectopy: none  Conduction: Minimal voltage criteria for LVH, may be normal variant  ST Segments/ T Waves: No ST-T wave changes  Q Waves: none  Comparison to prior: Compared to prior EKG on 2/26/2005, sinus bradycardia with BPM of 58 has resolved.  Otherwise unchanged    Clinical Impression: Sinus rhythm, no acute ischemic changes  Labs were reviewed by me and results are shown below.  X-ray was reviewed by me and results are shown below.  Patient did not have any further chest pain or other symptoms in the ED.     Results for orders placed or performed during the hospital encounter of 11/17/21   XR Chest Port 1 View      Status: None    Narrative    CHEST PORTABLE ONE VIEW November 17, 2021 12:36 PM     HISTORY: Chest pain.    COMPARISON: 46-year-old woman with chest pain.      Impression    IMPRESSION: Since November 18, 2014, heart size is normal. No pleural  effusion, pneumothorax, or abnormal area of consolidation.    ISIAH MACK MD         SYSTEM ID:  [id number]   Comprehensive metabolic panel     Status: Abnormal   Result Value Ref Range    Sodium 136 133 - 144 mmol/L    Potassium 4.2 3.4 - 5.3 mmol/L    Chloride 107 94 - 109 mmol/L    Carbon Dioxide (CO2) 25 20 - 32 mmol/L    Anion Gap 4 3 - 14 mmol/L    Urea Nitrogen 9 7 - 30 mg/dL    Creatinine 0.60 0.52 - 1.04 mg/dL    Calcium 8.8 8.5 - 10.1 mg/dL    Glucose 91 70 - 99 mg/dL    Alkaline Phosphatase 102 40 - 150 U/L    AST 19 0 - 45 U/L    ALT 26 0 - 50 U/L    Protein Total 7.5 6.8 - 8.8 g/dL    Albumin 3.3 (L) 3.4 - 5.0 g/dL    Bilirubin Total 0.4 0.2 - 1.3 mg/dL    GFR Estimate >90 >60 mL/min/1.73m2   Troponin I     Status: Normal   Result Value Ref Range    Troponin I <0.015 0.000 - 0.045 ug/L   D dimer quantitative     Status: Normal   Result Value Ref Range    D-Dimer Quantitative 0.39 0.00 - 0.50 ug/mL FEU    Narrative    This D-dimer assay is intended for use in conjunction with a clinical pretest probability assessment model to exclude pulmonary embolism (PE) and deep venous thrombosis (DVT) in outpatients suspected of PE or DVT. The cut-off value is 0.50 ug/mL FEU.   CBC with platelets and differential     Status: Abnormal   Result Value Ref Range    WBC Count 10.9 4.0 - 11.0 10e3/uL    RBC Count 4.64 3.80 - 5.20 10e6/uL    Hemoglobin 13.6 11.7 - 15.7 g/dL    Hematocrit 42.7 35.0 - 47.0 %    MCV 92 78 - 100 fL    MCH 29.3 26.5 - 33.0 pg    MCHC 31.9 31.5 - 36.5 g/dL    RDW 13.2 10.0 - 15.0 %    Platelet Count 281 150 - 450 10e3/uL    % Neutrophils 62 %    % Lymphocytes 26 %    % Monocytes 8 %    % Eosinophils 2 %    % Basophils 1 %    % Immature Granulocytes 1  %    NRBCs per 100 WBC 0 <1 /100    Absolute Neutrophils 7.0 1.6 - 8.3 10e3/uL    Absolute Lymphocytes 2.8 0.8 - 5.3 10e3/uL    Absolute Monocytes 0.8 0.0 - 1.3 10e3/uL    Absolute Eosinophils 0.2 0.0 - 0.7 10e3/uL    Absolute Basophils 0.1 0.0 - 0.2 10e3/uL    Absolute Immature Granulocytes 0.1 (H) <=0.0 10e3/uL    Absolute NRBCs 0.0 10e3/uL   EKG 12 lead     Status: None   Result Value Ref Range    Systolic Blood Pressure  mmHg    Diastolic Blood Pressure  mmHg    Ventricular Rate 71 BPM    Atrial Rate 71 BPM    FL Interval 138 ms    QRS Duration 94 ms     ms    QTc 412 ms    P Axis 50 degrees    R AXIS -3 degrees    T Axis 14 degrees    Interpretation ECG       Sinus rhythm  Minimal voltage criteria for LVH, may be normal variant  Borderline ECG     CBC with platelets differential     Status: Abnormal    Narrative    The following orders were created for panel order CBC with platelets differential.  Procedure                               Abnormality         Status                     ---------                               -----------         ------                     CBC with platelets and d...[[id number]]  Abnormal            Final result                 Please view results for these tests on the individual orders.     Medications - No data to display     Assessments & Plan (with Medical Decision Making)   Patient presents after 1 episode of having some chest discomfort with diaphoresis, lightheadedness, tingling in the hands and arms.  Given the paresthesias in both arms, I suspect she may have had some hyperventilation.  Symptoms have not returned.  EKG is negative, troponins negative, no sign of acute coronary syndrome.  Dimer is negative, unlikely to be PE.  X-rays negative for pneumothorax, pulmonary edema, pleural effusion, pneumonia.  Patient is low risk for coronary artery disease, since her symptoms have resolved, I recommended outpatient work-up.  We discussed what a stress test is and what  kind of information will provide.  Patient will contact her primary care physician about scheduling an outpatient stress test.  She was advised to return the emergency department if the chest pain returns, if she has return of her diaphoresis, palpitations, lightheadedness or any other concerns.    I have reviewed the nursing notes. I have reviewed the findings, diagnosis, plan and need for follow up with the patient.    Discharge Medication List as of 11/17/2021  1:53 PM          Final diagnoses:   Other chest pain     I, Zoila Bone, am serving as a trained medical scribe to document services personally performed by Maday Solo MD based on the provider's statements to me on November 17, 2021.  This document has been checked and approved by the attending provider.    I, Maday Solo MD, was physically present and have reviewed and verified the accuracy of this note documented by Zoila Bone, medical scribe.      Maday Solo MD   [facility] EMERGENCY DEPARTMENT  11/17/2021     Maday Solo MD  11/17/21 [id number]

## 2021-11-18 ENCOUNTER — PATIENT OUTREACH (OUTPATIENT)
Dept: FAMILY MEDICINE | Facility: CLINIC | Age: 46
End: 2021-11-18
Payer: COMMERCIAL

## 2021-11-18 DIAGNOSIS — R07.89 OTHER CHEST PAIN: Primary | ICD-10-CM

## 2021-11-18 NOTE — TELEPHONE ENCOUNTER
Writer called patient and reviewed stress test ordered by Dr. Fletcher and scheduling numbers for [place] and [place] locations.    Patient verbalized understanding and in agreement with plan.    NELA SantoroN, RN  [facility]

## 2021-11-18 NOTE — TELEPHONE ENCOUNTER
See 11/18/21 patient outreach encounter.    NELA SantoroN, RN  [facility]

## 2021-11-18 NOTE — TELEPHONE ENCOUNTER
" Providers-  1. Patient was in ER on 11/17/21 for chest pain and discharge recommendation was outpatient stress test-pended exercise stress test   A. No beta-blockers on active med list  2. Patient would like stress test ordered today, if possible; prefers not to wait for PCP to be back in clinic on Monday, 11/22/21      ED for acute condition Discharge Protocol    \"Hi, my name is Juliet Merritt RN, a registered nurse, and I am calling from [facility].  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    Would like stress test ordered today, if possible.      Discharge Instructions    \"Let's review your discharge instructions.  What is/are the follow-up recommendations?  Pt. Response: Outpatient stress test    \"Has an appointment with your primary care provider been scheduled?\"  No (not needed)    Medications    \"Tell me what changed about your medicines when you discharged?\"    None    \"What questions do you have about your medications?\"   None        Call Summary    \"What questions or concerns do you have about your recent visit and your follow-up care?\"     none    \"If you have questions or things don't continue to improve, we encourage you contact us through the main clinic number (give number).  Even if the clinic is not open, triage nurses are available 24/7 to help you.     We would like you to know that our clinic has extended hours (provide information).  We also have urgent care (provide details on closest location and hours/contact info)\"    \"Thank you for your time and take care!\"            ODALYS Santoro, RN  [facility]      "

## 2021-12-01 ENCOUNTER — HOSPITAL ENCOUNTER (OUTPATIENT)
Dept: CARDIOLOGY | Facility: HOSPITAL | Age: 46
Discharge: HOME | End: 2021-12-01
Attending: FAMILY MEDICINE | Admitting: FAMILY MEDICINE
Payer: COMMERCIAL

## 2021-12-01 DIAGNOSIS — R07.89 OTHER CHEST PAIN: ICD-10-CM

## 2021-12-01 LAB
CV STRESS CURRENT BP HE: NORMAL
CV STRESS CURRENT HR HE: 101
CV STRESS CURRENT HR HE: 102
CV STRESS CURRENT HR HE: 102
CV STRESS CURRENT HR HE: 104
CV STRESS CURRENT HR HE: 105
CV STRESS CURRENT HR HE: 106
CV STRESS CURRENT HR HE: 109
CV STRESS CURRENT HR HE: 109
CV STRESS CURRENT HR HE: 113
CV STRESS CURRENT HR HE: 114
CV STRESS CURRENT HR HE: 115
CV STRESS CURRENT HR HE: 118
CV STRESS CURRENT HR HE: 121
CV STRESS CURRENT HR HE: 123
CV STRESS CURRENT HR HE: 126
CV STRESS CURRENT HR HE: 134
CV STRESS CURRENT HR HE: 135
CV STRESS CURRENT HR HE: 136
CV STRESS CURRENT HR HE: 138
CV STRESS CURRENT HR HE: 144
CV STRESS CURRENT HR HE: 149
CV STRESS CURRENT HR HE: 150
CV STRESS CURRENT HR HE: 152
CV STRESS CURRENT HR HE: 76
CV STRESS CURRENT HR HE: 80
CV STRESS CURRENT HR HE: 88
CV STRESS DEVIATION TIME HE: NORMAL
CV STRESS ECHO PERCENT HR HE: NORMAL
CV STRESS EXERCISE STAGE HE: NORMAL
CV STRESS EXERCISE STAGE REACHED HE: NORMAL
CV STRESS FINAL RESTING BP HE: NORMAL
CV STRESS FINAL RESTING HR HE: 102
CV STRESS MAX HR HE: 153
CV STRESS MAX TREADMILL GRADE HE: 14
CV STRESS MAX TREADMILL SPEED HE: 3.4
CV STRESS PEAK DIA BP HE: NORMAL
CV STRESS PEAK SYS BP HE: NORMAL
CV STRESS PHASE HE: NORMAL
CV STRESS PROTOCOL HE: NORMAL
CV STRESS REASON STOPPED HE: NORMAL
CV STRESS RESTING PT POSITION HE: NORMAL
CV STRESS RESTING PT POSITION HE: NORMAL
CV STRESS ST DEVIATION AMOUNT HE: NORMAL
CV STRESS ST DEVIATION ELEVATION HE: NORMAL
CV STRESS ST EVELATION AMOUNT HE: NORMAL
CV STRESS SYMPTOMS HE: NORMAL
CV STRESS TEST TYPE HE: NORMAL
CV STRESS TOTAL STAGE TIME MIN 1 HE: NORMAL
STRESS ECHO BASELINE DIASTOLIC HE: 79
STRESS ECHO BASELINE HR: 80
STRESS ECHO BASELINE SYSTOLIC BP: 131
STRESS ECHO LAST STRESS DIASTOLIC BP: 80
STRESS ECHO LAST STRESS HR: 150
STRESS ECHO LAST STRESS SYSTOLIC BP: 152
STRESS ECHO POST ESTIMATED WORKLOAD: 7.7
STRESS ECHO POST EXERCISE DUR MIN: 6
STRESS ECHO POST EXERCISE DUR SEC: 21
STRESS ECHO TARGET HR: 150

## 2021-12-01 PROCEDURE — 93018 CV STRESS TEST I&R ONLY: CPT | Performed by: INTERNAL MEDICINE

## 2021-12-01 PROCEDURE — 93016 CV STRESS TEST SUPVJ ONLY: CPT | Performed by: INTERNAL MEDICINE

## 2021-12-01 PROCEDURE — 93017 CV STRESS TEST TRACING ONLY: CPT

## 2021-12-01 NOTE — RESULT ENCOUNTER NOTE
Camilla Ms. Chris,  Your stress test results came back showing no cardiac issue.  There were no changes to suggest lack of blood supply to the heart  It was stopped due to fatigue so suggests reduced exercise tolerance  Working on exercise and deconditioning will help in the future  There were a few extra beats noted when done with stress test the cardiologist suggested you can consider do a rhythm monitor and an echo cardiogram to see if these were  of any significance  There is no urgency to get this done and can wait till your PCP is back in office next week.  I would reccomend you visit with your PCP Rebecca ewing about this and she can order it if indicated as I have never evaluated you and just ordered this test in her absence.   .  I will forward this note to your PCP    If you have any further concerns please do not hesitate to contact us by message, phone or making an appointment.  Have a good day   Dr Chance WARD

## 2021-12-06 DIAGNOSIS — I49.3 PVC'S (PREMATURE VENTRICULAR CONTRACTIONS): Primary | ICD-10-CM

## 2021-12-07 NOTE — PROGRESS NOTES
Pt contacted and given [phone] to call to schedule    Aaliyah Mckeon, RN, BSN  [facility]

## 2021-12-08 NOTE — TELEPHONE ENCOUNTER
Writer responded via [employer].    NELA SantoroN, RN  [facility]

## 2021-12-08 NOTE — TELEPHONE ENCOUNTER
See stress test done 12/1/2021 and zio patch dated 12/6/2021. Is wondering what's next? cardiology consult?    Thank you

## 2021-12-08 NOTE — TELEPHONE ENCOUNTER
I placed the order for the zio patch and echo on 12/6 and sent a message to triage asking to let patient know this is the next step and to give her the number to schedule please.

## 2021-12-21 ASSESSMENT — ANXIETY QUESTIONNAIRES
1. FEELING NERVOUS, ANXIOUS, OR ON EDGE: SEVERAL DAYS
5. BEING SO RESTLESS THAT IT IS HARD TO SIT STILL: NOT AT ALL
4. TROUBLE RELAXING: NOT AT ALL
7. FEELING AFRAID AS IF SOMETHING AWFUL MIGHT HAPPEN: SEVERAL DAYS
2. NOT BEING ABLE TO STOP OR CONTROL WORRYING: SEVERAL DAYS
7. FEELING AFRAID AS IF SOMETHING AWFUL MIGHT HAPPEN: SEVERAL DAYS
6. BECOMING EASILY ANNOYED OR IRRITABLE: SEVERAL DAYS
GAD7 TOTAL SCORE: 5
GAD7 TOTAL SCORE: 5
3. WORRYING TOO MUCH ABOUT DIFFERENT THINGS: SEVERAL DAYS
GAD7 TOTAL SCORE: 5

## 2021-12-21 ASSESSMENT — PATIENT HEALTH QUESTIONNAIRE - PHQ9
SUM OF ALL RESPONSES TO PHQ QUESTIONS 1-9: 14
10. IF YOU CHECKED OFF ANY PROBLEMS, HOW DIFFICULT HAVE THESE PROBLEMS MADE IT FOR YOU TO DO YOUR WORK, TAKE CARE OF THINGS AT HOME, OR GET ALONG WITH OTHER PEOPLE: SOMEWHAT DIFFICULT
SUM OF ALL RESPONSES TO PHQ QUESTIONS 1-9: 14

## 2021-12-22 ENCOUNTER — VIRTUAL VISIT (OUTPATIENT)
Dept: FAMILY MEDICINE | Facility: CLINIC | Age: 46
End: 2021-12-22
Payer: COMMERCIAL

## 2021-12-22 DIAGNOSIS — F32.1 MODERATE MAJOR DEPRESSION (H): ICD-10-CM

## 2021-12-22 DIAGNOSIS — G47.00 INSOMNIA, UNSPECIFIED TYPE: ICD-10-CM

## 2021-12-22 PROCEDURE — 99215 OFFICE O/P EST HI 40 MIN: CPT | Mod: GT | Performed by: NURSE PRACTITIONER

## 2021-12-22 PROCEDURE — 96127 BRIEF EMOTIONAL/BEHAV ASSMT: CPT | Mod: 59 | Performed by: NURSE PRACTITIONER

## 2021-12-22 RX ORDER — DESVENLAFAXINE 50 MG/1
50 TABLET, FILM COATED, EXTENDED RELEASE ORAL DAILY
Qty: 30 TABLET | Refills: 1 | Status: SHIPPED | OUTPATIENT
Start: 2021-12-22 | End: 2022-01-12

## 2021-12-22 ASSESSMENT — ANXIETY QUESTIONNAIRES: GAD7 TOTAL SCORE: 5

## 2021-12-22 ASSESSMENT — PATIENT HEALTH QUESTIONNAIRE - PHQ9: SUM OF ALL RESPONSES TO PHQ QUESTIONS 1-9: 14

## 2021-12-22 NOTE — PATIENT INSTRUCTIONS
Decrease Trintellix to 1/2 tablet (10 mg) daily and take Pristiq 50 mg daily for one week, then decrease Trintellix to 5 mg daily (while continuing to take Pristiq) for one week, then stop Trintellix.    Continue on current dose of Wellbutrin.    Follow up in one month.

## 2021-12-22 NOTE — PROGRESS NOTES
Leandra is a 46 year old who is being evaluated via a billable video visit.      How would you like to obtain your AVS? MyChart  If the video visit is dropped, the invitation should be resent by: Text to cell phone: [phone] (Mobile)  Will anyone else be joining your video visit? No    Video Start Time: 5:31 PM    Assessment & Plan     (F32.1) Moderate major depression (H)  Comment: worsening  Plan: desvenlafaxine (PRISTIQ) 50 MG 24 hr tablet,         vortioxetine (TRINTELLIX) 5 MG tablet          Patient Instructions   Decrease Trintellix to 1/2 tablet (10 mg) daily and take Pristiq 50 mg daily for one week, then decrease Trintellix to 5 mg daily (while continuing to take Pristiq) for one week, then stop Trintellix.    Continue on current dose of Wellbutrin.    Follow up in one month.      Contracted for safety.  See Depression Action Plan.       42 minutes spent on the date of the encounter doing chart review, patient visit and documentation        Depression Screening Follow Up    PHQ 12/21/2021   PHQ-9 Total Score 14   Q9: Thoughts of better off dead/self-harm past 2 weeks Several days   F/U: Thoughts of suicide or self-harm Yes   F/U: Self harm-plan Yes   F/U: Self-harm action No   F/U: Safety concerns No       No follow-ups on file.    Nelli Nye NP  [facility]    Ariela Mobley is a 46 year old who presents for the following health issues     History of Present Illness       Mental Health Follow-up:  Patient presents to follow-up on Depression.Patient's depression since last visit has been:  Worse  The patient is not having other symptoms associated with depression.      Any significant life events: job concerns, grief or loss and health concerns  Patient is not feeling anxious or having panic attacks.  Patient has no concerns about alcohol or drug use.     Social History  Tobacco Use    Smoking status: Former Smoker      Packs/day: 0.50      Years: 13.00       Pack years: 6.5      Types: Cigarettes      Start date: 1999      Quit date: 2012      Years since quittin.8    Smokeless tobacco: Never Used  Alcohol use: Yes    Comment: rarely  Drug use: No      Today's PHQ-9         PHQ-9 Total Score:     (P) 14   PHQ-9 Q9 Thoughts of better off dead/self-harm past 2 weeks :   (P) Several days   Thoughts of suicide or self harm:  (P) Yes   Self-harm Plan:    (P) Yes   Self-harm Action:      (P) No   Safety concerns for self or others: (P) No         Answers for HPI/ROS submitted by the patient on 2021  If you checked off any problems, how difficult have these problems made it for you to do your work, take care of things at home, or get along with other people?: Somewhat difficult  PHQ9 TOTAL SCORE: 14  DUNCAN 7 TOTAL SCORE: 5          Social History     Tobacco Use     Smoking status: Former Smoker     Packs/day: 0.50     Years: 13.00     Pack years: 6.50     Types: Cigarettes, Clove cigarettes or kreteks     Start date: 1999     Quit date: 2012     Years since quittin.8     Smokeless tobacco: Never Used   Substance Use Topics     Alcohol use: Yes     Comment: rarely     Drug use: No     PHQ 2021 3/23/2021 2021   PHQ-9 Total Score 11 13 14   Q9: Thoughts of better off dead/self-harm past 2 weeks Not at all More than half the days Several days   F/U: Thoughts of suicide or self-harm - Yes Yes   F/U: Self harm-plan - Yes Yes   F/U: Self-harm action - No No   F/U: Safety concerns - No No     DUNCAN-7 SCORE 2021 3/23/2021 2021   Total Score - - -   Total Score - 4 (minimal anxiety) 5 (mild anxiety)   Total Score 0 4 5            Follow Up Actions Taken       Discussed the following ways the patient can remain in a safe environment:    Suicide Assessment Five-step Evaluation  and Treatment (SAFE-T)              Tried Effexor and Lexapro in the past.   She did do an outpatient day treatment program in the past.  She is seeing her therapist  1-2 times a week.  She has thoughts of death, but denies any intent to act on them or plan.  She does feel safe.      Review of Systems         Objective           Vitals:  No vitals were obtained today due to virtual visit.    Physical Exam   GENERAL: Healthy, alert and no distress  EYES: Eyes grossly normal to inspection.  No discharge or erythema, or obvious scleral/conjunctival abnormalities.  RESP: No audible wheeze, cough, or visible cyanosis.  No visible retractions or increased work of breathing.    SKIN: Visible skin clear. No significant rash, abnormal pigmentation or lesions.  NEURO: Cranial nerves grossly intact.  Mentation and speech appropriate for age.  PSYCH: Mentation appears normal, affect flattened, judgement and insight intact, normal speech and appearance well-groomed.                Video-Visit Details    Type of service:  Video Visit    Video End Time:5:59 PM    Originating Location (pt. Location): Home    Distant Location (provider location):  [facility]     Platform used for Video Visit: [employer]

## 2021-12-22 NOTE — LETTER
My Depression Action Plan  Name: Leandra Chris   Date of Birth 1975  Date: 12/22/2021    My doctor: Nelli Nye   My clinic: [facility] [address] [place] [id number] [phone]          GREEN    ZONE   Good Control    What it looks like:     Things are going generally well. You have normal ups and downs. You may even feel depressed from time to time, but bad moods usually last less than a day.   What you need to do:  1. Continue to care for yourself (see self care plan)  2. Check your depression survival kit and update it as needed  3. Follow your physician s recommendations including any medication.  4. Do not stop taking medication unless you consult with your physician first.           YELLOW         ZONE Getting Worse    What it looks like:     Depression is starting to interfere with your life.     It may be hard to get out of bed; you may be starting to isolate yourself from others.    Symptoms of depression are starting to last most all day and this has happened for several days.     You may have suicidal thoughts but they are not constant.   What you need to do:     1. Call your care team. Your response to treatment will improve if you keep your care team informed of your progress. Yellow periods are signs an adjustment may need to be made.     2. Continue your self-care.  Just get dressed and ready for the day.  Don't give yourself time to talk yourself out of it.    3. Talk to someone in your support network.    4. Open up your Depression Self-Care Plan/Wellness Kit.           RED    ZONE Medical Alert - Get Help    What it looks like:     Depression is seriously interfering with your life.     You may experience these or other symptoms: You can t get out of bed most days, can t work or engage in other necessary activities, you have trouble taking care of basic hygiene, or basic responsibilities, thoughts of suicide or death that will  not go away, self-injurious behavior.     What you need to do:  1. Call your care team and request a same-day appointment. If they are not available (weekends or after hours) call your local crisis line, emergency room or 911.          Depression Self-Care Plan / Wellness Kit    Many people find that medication and therapy are helpful treatments for managing depression. In addition, making small changes to your everyday life can help to boost your mood and improve your wellbeing. Below are some tips for you to consider. Be sure to talk with your medical provider and/or behavioral health consultant if your symptoms are worsening or not improving.     Sleep   Sleep hygiene  means all of the habits that support good, restful sleep. It includes maintaining a consistent bedtime and wake time, using your bedroom only for sleeping or sex, and keeping the bedroom dark and free of distractions like a computer, smartphone, or television.     Develop a Healthy Routine  Maintain good hygiene. Get out of bed in the morning, make your bed, brush your teeth, take a shower, and get dressed. Don t spend too much time viewing media that makes you feel stressed. Find time to relax each day.    Exercise  Get some form of exercise every day. This will help reduce pain and release endorphins, the  feel good  chemicals in your brain. It can be as simple as just going for a walk or doing some gardening, anything that will get you moving.      Diet  Strive to eat healthy foods, including fruits and vegetables. Drink plenty of water. Avoid excessive sugar, caffeine, alcohol, and other mood-altering substances.     Stay Connected with Others  Stay in touch with friends and family members.    Manage Your Mood  Try deep breathing, massage therapy, biofeedback, or meditation. Take part in fun activities when you can. Try to find something to smile about each day.     Psychotherapy  Be open to working with a therapist if your provider recommends  it.     Medication  Be sure to take your medication as prescribed. Most anti-depressants need to be taken every day. It usually takes several weeks for medications to work. Not all medicines work for all people. It is important to follow-up with your provider to make sure you have a treatment plan that is working for you. Do not stop your medication abruptly without first discussing it with your provider.    Crisis Resources   These hotlines are for both adults and children. They and are open 24 hours a day, 7 days a week unless noted otherwise.      National Suicide Prevention Lifeline   0-272-358-TALK (0390)      Crisis Text Line    www.crisistextline.org  Text HOME to 824226 from anywhere in the [place], anytime, about any type of crisis. A live, trained crisis counselor will receive the text and respond quickly.      [facility] Lifeline for LGBTQ Youth  A national crisis intervention and suicide lifeline for LGBTQ youth under 25. Provides a safe place to talk without judgement. Call 1-663.623.7567; text START to 032831 or visit www.thetrevorproject.org to talk to a trained counselor.      For [facility] crisis numbers, visit the [facility] website at:  https://[place].gov/dhs/people-we-serve/adults/health-care/mental-health/resources/crisis-contacts.jsp

## 2021-12-23 RX ORDER — ZOLPIDEM TARTRATE 10 MG/1
TABLET ORAL
Qty: 30 TABLET | Refills: 0 | Status: SHIPPED | OUTPATIENT
Start: 2021-12-23 | End: 2022-01-12

## 2021-12-23 NOTE — TELEPHONE ENCOUNTER
Routing refill request to provider for review/approval because:  Drug not on the [facility] refill protocol     Last Written Prescription Date:  6/9/21  Last Fill Quantity: 30,  # refills: 5   Last office visit: 12/22/21 with prescribing provider:  VALERIANO Nye   Future Office Visit:  None    NELA ByrneN RN  [facility]

## 2021-12-28 ENCOUNTER — MYC REFILL (OUTPATIENT)
Dept: FAMILY MEDICINE | Facility: CLINIC | Age: 46
End: 2021-12-28
Payer: COMMERCIAL

## 2021-12-28 DIAGNOSIS — G47.00 INSOMNIA, UNSPECIFIED TYPE: ICD-10-CM

## 2021-12-28 RX ORDER — ZOLPIDEM TARTRATE 10 MG/1
TABLET ORAL
Qty: 30 TABLET | Refills: 0 | OUTPATIENT
Start: 2021-12-28

## 2021-12-28 NOTE — TELEPHONE ENCOUNTER
Med refused. Patient has active prescription at a different pharmacy.    Ingrid Pacheco RN  [facility]

## 2021-12-29 DIAGNOSIS — G47.00 INSOMNIA, UNSPECIFIED TYPE: ICD-10-CM

## 2021-12-29 RX ORDER — ZOLPIDEM TARTRATE 10 MG/1
TABLET ORAL
Qty: 30 TABLET | Refills: 0 | OUTPATIENT
Start: 2021-12-29

## 2021-12-30 ENCOUNTER — ANCILLARY PROCEDURE (OUTPATIENT)
Dept: CARDIOLOGY | Facility: CLINIC | Age: 46
End: 2021-12-30
Attending: NURSE PRACTITIONER
Payer: COMMERCIAL

## 2021-12-30 DIAGNOSIS — I49.3 PVC'S (PREMATURE VENTRICULAR CONTRACTIONS): ICD-10-CM

## 2021-12-30 LAB — LVEF ECHO: NORMAL

## 2021-12-30 PROCEDURE — 93306 TTE W/DOPPLER COMPLETE: CPT | Performed by: INTERNAL MEDICINE

## 2021-12-30 PROCEDURE — 93225 XTRNL ECG REC<48 HRS REC: CPT

## 2021-12-30 PROCEDURE — 93227 XTRNL ECG REC<48 HR R&I: CPT | Performed by: INTERNAL MEDICINE

## 2021-12-30 NOTE — PROGRESS NOTES
"Per Dr. Nelli Nye NP, patient to have 48 hour Zio monitor placed.  Diagnosis: PVC's  Monitor placed: {YES / NO:577662::\"Yes\"}  Patient Instructed: {YES / NO:377085::\"Yes\"}  Patient verbalized understanding: {YES / NO:562905::\"Yes\"}  Holter#[id number]     Monitor placed by Ga Nur  "

## 2022-01-12 ENCOUNTER — VIRTUAL VISIT (OUTPATIENT)
Dept: FAMILY MEDICINE | Facility: CLINIC | Age: 47
End: 2022-01-12
Payer: COMMERCIAL

## 2022-01-12 DIAGNOSIS — G47.00 INSOMNIA, UNSPECIFIED TYPE: ICD-10-CM

## 2022-01-12 DIAGNOSIS — F32.1 MODERATE MAJOR DEPRESSION (H): Primary | ICD-10-CM

## 2022-01-12 DIAGNOSIS — E66.01 MORBID OBESITY WITH BMI OF 40.0-44.9, ADULT (H): ICD-10-CM

## 2022-01-12 PROCEDURE — 99213 OFFICE O/P EST LOW 20 MIN: CPT | Mod: GT | Performed by: NURSE PRACTITIONER

## 2022-01-12 RX ORDER — ZOLPIDEM TARTRATE 10 MG/1
TABLET ORAL
Qty: 30 TABLET | Refills: 5 | Status: SHIPPED | OUTPATIENT
Start: 2022-01-12 | End: 2022-06-20

## 2022-01-12 RX ORDER — BUPROPION HYDROCHLORIDE 150 MG/1
TABLET ORAL
Qty: 270 TABLET | Refills: 3 | Status: SHIPPED | OUTPATIENT
Start: 2022-01-12 | End: 2022-06-20

## 2022-01-12 ASSESSMENT — ANXIETY QUESTIONNAIRES
2. NOT BEING ABLE TO STOP OR CONTROL WORRYING: NOT AT ALL
GAD7 TOTAL SCORE: 4
1. FEELING NERVOUS, ANXIOUS, OR ON EDGE: MORE THAN HALF THE DAYS
3. WORRYING TOO MUCH ABOUT DIFFERENT THINGS: NOT AT ALL
6. BECOMING EASILY ANNOYED OR IRRITABLE: SEVERAL DAYS
7. FEELING AFRAID AS IF SOMETHING AWFUL MIGHT HAPPEN: SEVERAL DAYS
4. TROUBLE RELAXING: NOT AT ALL
GAD7 TOTAL SCORE: 4
7. FEELING AFRAID AS IF SOMETHING AWFUL MIGHT HAPPEN: SEVERAL DAYS
5. BEING SO RESTLESS THAT IT IS HARD TO SIT STILL: NOT AT ALL

## 2022-01-12 ASSESSMENT — PATIENT HEALTH QUESTIONNAIRE - PHQ9
SUM OF ALL RESPONSES TO PHQ QUESTIONS 1-9: 5
SUM OF ALL RESPONSES TO PHQ QUESTIONS 1-9: 5
10. IF YOU CHECKED OFF ANY PROBLEMS, HOW DIFFICULT HAVE THESE PROBLEMS MADE IT FOR YOU TO DO YOUR WORK, TAKE CARE OF THINGS AT HOME, OR GET ALONG WITH OTHER PEOPLE: SOMEWHAT DIFFICULT

## 2022-01-13 ASSESSMENT — ANXIETY QUESTIONNAIRES: GAD7 TOTAL SCORE: 4

## 2022-01-13 ASSESSMENT — PATIENT HEALTH QUESTIONNAIRE - PHQ9: SUM OF ALL RESPONSES TO PHQ QUESTIONS 1-9: 5

## 2022-01-19 ENCOUNTER — MYC MEDICAL ADVICE (OUTPATIENT)
Dept: FAMILY MEDICINE | Facility: CLINIC | Age: 47
End: 2022-01-19
Payer: COMMERCIAL

## 2022-01-25 ENCOUNTER — MYC MEDICAL ADVICE (OUTPATIENT)
Dept: FAMILY MEDICINE | Facility: CLINIC | Age: 47
End: 2022-01-25
Payer: COMMERCIAL

## 2022-01-26 ENCOUNTER — VIRTUAL VISIT (OUTPATIENT)
Dept: FAMILY MEDICINE | Facility: CLINIC | Age: 47
End: 2022-01-26
Payer: COMMERCIAL

## 2022-01-26 DIAGNOSIS — I49.3 PVC'S (PREMATURE VENTRICULAR CONTRACTIONS): Primary | ICD-10-CM

## 2022-01-26 PROCEDURE — 99213 OFFICE O/P EST LOW 20 MIN: CPT | Mod: GT | Performed by: NURSE PRACTITIONER

## 2022-01-26 NOTE — PROGRESS NOTES
"Leandra is a 47 year old who is being evaluated via a billable video visit.      How would you like to obtain your AVS? MyChart  If the video visit is dropped, the invitation should be resent by: Send to e-mail at: [email or web address]  Will anyone else be joining your video visit? No    Video Start Time: 5:39 PM    Assessment & Plan     (I49.3) PVC's (premature ventricular contractions)  (primary encounter diagnosis)  Comment:   Plan: I did contact [employer] as well as [facility] cardiology.  I finally received results which showed predominantly NSR with occasional PVCs (<1%).  Message sent to patient to inform her of results.  No further evaluation or treatment needed.  Follow up as needed.       24 minutes spent on the date of the encounter doing chart review and review of outside records        BMI:   Estimated body mass index is 41.97 kg/m  as calculated from the following:    Height as of 8/5/21: 1.676 m (5' 6\").    Weight as of 8/5/21: 117.9 kg (260 lb).   Weight management plan: Discussed healthy diet and exercise guidelines        No follow-ups on file.    Nelli Nye NP  [facility]    Subjective   Leandra is a 47 year old who presents for the following health issues     HPI     Pt would like to discuss Zio Patch results.     Zio Patch company said report was posted on 1/24.            Review of Systems         Objective           Vitals:  No vitals were obtained today due to virtual visit.    Physical Exam   GENERAL: Healthy, alert and no distress  EYES: Eyes grossly normal to inspection.  No discharge or erythema, or obvious scleral/conjunctival abnormalities.  RESP: No audible wheeze, cough, or visible cyanosis.  No visible retractions or increased work of breathing.    SKIN: Visible skin clear. No significant rash, abnormal pigmentation or lesions.  NEURO: Cranial nerves grossly intact.  Mentation and speech appropriate for age.  PSYCH: Mentation appears normal, affect " normal/bright, judgement and insight intact, normal speech and appearance well-groomed.                Video-Visit Details    Type of service:  Video Visit    Video End Time:5:53 PM    Originating Location (pt. Location): Home    Distant Location (provider location):  [facility]     Platform used for Video Visit: [employer]

## 2022-01-26 NOTE — Clinical Note
Hi there,  You placed this zio patch, so I'm sending this message to you, as I'm not sure who else to contact.  I still don't have the results.  Can you check on the status for me please?

## 2022-01-26 NOTE — Clinical Note
Can you call the holter dept at the  and see where her zio patch results are please?  I called the company last week and they said they were posted to them on 1/24.  I assume they then interpret and release, but it's niels awhile.

## 2022-02-01 ENCOUNTER — MYC MEDICAL ADVICE (OUTPATIENT)
Dept: FAMILY MEDICINE | Facility: CLINIC | Age: 47
End: 2022-02-01
Payer: COMMERCIAL

## 2022-02-22 ENCOUNTER — MYC REFILL (OUTPATIENT)
Dept: FAMILY MEDICINE | Facility: CLINIC | Age: 47
End: 2022-02-22
Payer: COMMERCIAL

## 2022-02-22 DIAGNOSIS — F40.243 FLYING PHOBIA: ICD-10-CM

## 2022-02-23 RX ORDER — ALPRAZOLAM 0.5 MG
TABLET ORAL
Qty: 20 TABLET | Refills: 0 | Status: SHIPPED | OUTPATIENT
Start: 2022-02-23 | End: 2023-05-22

## 2022-02-23 NOTE — TELEPHONE ENCOUNTER
Routing refill request to provider for review/approval because:  Drug not on the [facility] refill protocol   Last filled:  ALPRAZolam (XANAX) 0.5 MG tablet 20 tablet 0 6/9/2021     Last visit:1/26/2022    Upcoming visit: 6/20/2022    Thank you

## 2022-03-06 ENCOUNTER — HEALTH MAINTENANCE LETTER (OUTPATIENT)
Age: 47
End: 2022-03-06

## 2022-03-17 ENCOUNTER — OFFICE VISIT (OUTPATIENT)
Dept: FAMILY MEDICINE | Facility: CLINIC | Age: 47
End: 2022-03-17
Payer: COMMERCIAL

## 2022-03-17 VITALS
WEIGHT: 265 LBS | HEART RATE: 76 BPM | RESPIRATION RATE: 18 BRPM | BODY MASS INDEX: 42.77 KG/M2 | TEMPERATURE: 97.8 F | SYSTOLIC BLOOD PRESSURE: 120 MMHG | DIASTOLIC BLOOD PRESSURE: 81 MMHG

## 2022-03-17 DIAGNOSIS — M25.571 PAIN IN JOINT INVOLVING ANKLE AND FOOT, RIGHT: Primary | ICD-10-CM

## 2022-03-17 PROBLEM — F43.10 PTSD (POST-TRAUMATIC STRESS DISORDER): Status: ACTIVE | Noted: 2019-10-21

## 2022-03-17 PROBLEM — F33.8 SEASONAL AFFECTIVE DISORDER (H): Status: ACTIVE | Noted: 2019-11-22

## 2022-03-17 PROBLEM — F17.201 TOBACCO DEPENDENCE IN REMISSION: Status: ACTIVE | Noted: 2019-10-21

## 2022-03-17 PROBLEM — F40.298 SPECIFIC PHOBIA: Status: ACTIVE | Noted: 2019-10-21

## 2022-03-17 PROCEDURE — 99214 OFFICE O/P EST MOD 30 MIN: CPT | Performed by: PHYSICIAN ASSISTANT

## 2022-03-17 NOTE — PROGRESS NOTES
Subjective:      Leandra Chris is a 47 year old female with chief complaint of foot pain after injury.  Patient was in [place] 2 days ago.  She missed stepped when she was stepping off of a curb and injured her foot and ankle.  She fell down, hit her right wrist and right knee.  She also had a small abrasion on the right side of her forehead.  Other areas are feeling better, but right foot and ankle are continuing to hurt.  Ankle/foot pain getting worse.  She says is hard for her to flex and extend her foot, also makes it harder to drive.  She is a history of some heel pain in the past, but no other injuries.  If she is sitting sometimes the area of her foot feels a little numb.  She is not been walking as much to the pain.  Stairs are especially painful.  She is taking Tylenol for pain and that does seem to help.    Patient Active Problem List   Diagnosis     PCOS (polycystic ovarian syndrome)     Vitamin D deficiency     Insomnia     CARDIOVASCULAR SCREENING; LDL GOAL LESS THAN 160     Migraine headache     Obesity     BMI of 40.0-44.9, adult (H)     Moderate major depression (H)     SERGE (obstructive sleep apnea)     TMJ disease     Tobacco dependence in remission     Specific phobia     Seasonal affective disorder (H)     PTSD (post-traumatic stress disorder)     Overdose, drug       Current Outpatient Medications:      Acetaminophen (TYLENOL PO), Take by mouth every 4 hours as needed for mild pain or fever Reported on 4/4/2017, Disp: , Rfl:      ALPRAZolam (XANAX) 0.5 MG tablet, Take 1-2 tablets 45 minutes prior to flight, Disp: 20 tablet, Rfl: 0     buPROPion (WELLBUTRIN XL) 150 MG 24 hr tablet, TAKE 3 TABLETS BY MOUTH DAILY, Disp: 270 tablet, Rfl: 3     butalbital-aspirin-caffeine (FIORINAL) -40 MG capsule, TAKE 1 CAPSULE BY MOUTH EVERY 4 HOURS AS NEEDED FOR PAIN, Disp: 20 capsule, Rfl: 5     IBUPROFEN PO, Take 400 mg by mouth as needed Reported on 4/4/2017, Disp: , Rfl:      meloxicam (MOBIC) 7.5  MG tablet, Take 1 tablet (7.5 mg) by mouth daily, Disp: 30 tablet, Rfl: 5     vortioxetine (TRINTELLIX) 20 MG tablet, Take 1 tablet (20 mg) by mouth daily, Disp: 90 tablet, Rfl: 3     zolpidem (AMBIEN) 10 MG tablet, TAKE 1 tablet BY MOUTH EVERY NIGHT AS NEEDED FOR SLEEP., Disp: 30 tablet, Rfl: 5       Objective:     Allergies   Allergen Reactions     Penicillin [Penicillins]      Trazodone      Other reaction(s): Headache  Pt reports after taking trazodone she had a migraine requiring IM medication     Shellfish-Derived Products Rash     /81 (BP Location: Right arm, Patient Position: Sitting, Cuff Size: Adult Large)   Pulse 76   Temp 97.8  F (36.6  C) (Temporal)   Resp 18   Wt 120.2 kg (265 lb)   BMI 42.77 kg/m    Body mass index is 42.77 kg/m .    Vitals reviewed as above.  General: Patient is alert and oriented x 3, in no apparent distress  Eyes: Sclera clear bilaterally, EOMs intact bilaterally without pain, PERRLA bilaterally  Throat: No erythema, edema, or exudate noted  Skin: Superficial small healing abrasion present above the right eyebrow, approximately 2 cm in size, no significant swelling or bruising  Cardiac: Regular rate and rhythm, no murmurs  Pulmonary: lungs clear to ausculation, no crackles, rales, rhonchi, or wheezing  Musculoskeletal: She walks with a slight limp today, right medial malleoli is minimally edematous, no pain with palpation of either malleoli, no pain with palpation of the base of the fifth metatarsal, she does have pain triggered on the medial ankle and foot with forced flexion and extension at the ankle, no ligament laxity noted in the ankle, toes do have slightly limited range of motion due to pain, normal pedal pulse, no bruising, no abrasions, no pain with palpation of heel  Right wrist exam is grossly normal.  No edema or erythema, no discoloration, no pain with palpation of carpals and metacarpals, normal  strength in the right hand, normal right radial pulse,  no pain with flexion and extension at the wrist, no pain with inversion and eversion of the wrist  Neuro: Cranial Nerves 2-12 grossly intact      EXAM: XR ANKLE RIGHT G/E 3 VIEWS  LOCATION: [facility]  DATE/TIME: 3/17/2022 2:54 PM  INDICATION: Right ankle pain.  COMPARISON: None.                                                              IMPRESSION: Normal joint spaces and alignment. No fracture. Calcaneus enthesophytes. Soft tissue swelling or prominence about the ankle. Small punctate foci of soft tissue calcification (benign-appearing) in the posterior aspect of the ankle, nonspecific.    I personally reviewed grossly normal x-rays today.  No fractures seen.    Assessment and Plan:     1. Pain in joint involving ankle and foot, right  Suspect sprain.  Discussed continued conservative management.  Anticipate improvement over the next 2 to 3 weeks.  Ankle brace was provided to patient today.  We discussed staying mobile, but not overdoing it.  Gentle ROM exercises.  Discussed elevation and pain medicine over-the-counter as needed.  Follow-up in 2 weeks if no better, sooner if worsening.  Regarding other complaints of mild wrist pain and forehead abrasion, these appear to be resolving and symptoms have improved.  Continue to monitor.  Exam is reassuring.  - XR Ankle Right G/E 3 Views; Future      This dictation uses voice recognition software, which may contain typographical errors.

## 2022-06-08 NOTE — PROGRESS NOTES
Addended by: Li Mosquera on: 6/8/2022 04:30 PM     Modules accepted: Orders Leandra is a 46 year old who is being evaluated via a billable video visit.      How would you like to obtain your AVS? MyChart  If the video visit is dropped, the invitation should be resent by: Text to cell phone: [phone]  Will anyone else be joining your video visit? No    Video Start Time: 5:07 PM    Assessment & Plan     (F32.1) Moderate major depression (H)  (primary encounter diagnosis)  Comment: IMPROVING  Plan: buPROPion (WELLBUTRIN XL) 150 MG 24 hr tablet,         vortioxetine (TRINTELLIX) 20 MG tablet        The current medical regimen is effective;  continue present plan and medications.  She will continue to work with her therapist.     (G47.00) Insomnia, unspecified type  Comment: stable  Plan: zolpidem (AMBIEN) 10 MG tablet        The current medical regimen is effective;  continue present plan and medications.     (E66.01,  Z68.41) BMI of 40.0-44.9, adult (H)  Comment:   Plan: Work on diet and exercise.       21 minutes spent on the date of the encounter doing patient visit and documentation            No follow-ups on file.    Nelli Nye NP  [facility]   Leandra is a 46 year old who presents for the following health issues     HPI     Recheck depression meds.    After our last visit, she realized that at some time, she went back to only taking 300 mg of Wellbutrin instead of 450 mg.  She also thinks through working with her therapist, that increased stress with losing her aunt, the holidays and COVID contributed to an exacerbation of her depression.  She is feeling much better        Review of Systems         Objective           Vitals:  No vitals were obtained today due to virtual visit.    Physical Exam   GENERAL: Healthy, alert and no distress  EYES: Eyes grossly normal to inspection.  No discharge or erythema, or obvious scleral/conjunctival abnormalities.  RESP: No audible wheeze, cough, or visible cyanosis.  No visible retractions or  increased work of breathing.    SKIN: Visible skin clear. No significant rash, abnormal pigmentation or lesions.  NEURO: Cranial nerves grossly intact.  Mentation and speech appropriate for age.  PSYCH: Mentation appears normal, affect normal/bright, judgement and insight intact, normal speech and appearance well-groomed.                Video-Visit Details    Type of service:  Video Visit    Video End Time:5:21 PM    Originating Location (pt. Location): Home    Distant Location (provider location):  [facility]     Platform used for Video Visit: [employer]

## 2022-06-17 ASSESSMENT — ANXIETY QUESTIONNAIRES
2. NOT BEING ABLE TO STOP OR CONTROL WORRYING: SEVERAL DAYS
6. BECOMING EASILY ANNOYED OR IRRITABLE: SEVERAL DAYS
5. BEING SO RESTLESS THAT IT IS HARD TO SIT STILL: NOT AT ALL
3. WORRYING TOO MUCH ABOUT DIFFERENT THINGS: SEVERAL DAYS
7. FEELING AFRAID AS IF SOMETHING AWFUL MIGHT HAPPEN: SEVERAL DAYS
IF YOU CHECKED OFF ANY PROBLEMS ON THIS QUESTIONNAIRE, HOW DIFFICULT HAVE THESE PROBLEMS MADE IT FOR YOU TO DO YOUR WORK, TAKE CARE OF THINGS AT HOME, OR GET ALONG WITH OTHER PEOPLE: NOT DIFFICULT AT ALL
1. FEELING NERVOUS, ANXIOUS, OR ON EDGE: SEVERAL DAYS
GAD7 TOTAL SCORE: 5

## 2022-06-17 ASSESSMENT — ENCOUNTER SYMPTOMS
HEADACHES: 0
ABDOMINAL PAIN: 0
MYALGIAS: 1
NERVOUS/ANXIOUS: 0
HEARTBURN: 0
WEAKNESS: 0
PALPITATIONS: 0
CHILLS: 0
FREQUENCY: 0
HEMATOCHEZIA: 0
SHORTNESS OF BREATH: 0
PARESTHESIAS: 0
CONSTIPATION: 0
EYE PAIN: 0
DIARRHEA: 0
HEMATURIA: 0
BREAST MASS: 0
FEVER: 0
ARTHRALGIAS: 1
COUGH: 0
DIZZINESS: 0
NAUSEA: 0
SORE THROAT: 0
DYSURIA: 0
JOINT SWELLING: 1

## 2022-06-17 ASSESSMENT — PATIENT HEALTH QUESTIONNAIRE - PHQ9: 5. POOR APPETITE OR OVEREATING: NOT AT ALL

## 2022-06-20 ENCOUNTER — OFFICE VISIT (OUTPATIENT)
Dept: FAMILY MEDICINE | Facility: CLINIC | Age: 47
End: 2022-06-20
Payer: COMMERCIAL

## 2022-06-20 VITALS
SYSTOLIC BLOOD PRESSURE: 124 MMHG | BODY MASS INDEX: 41.75 KG/M2 | OXYGEN SATURATION: 97 % | TEMPERATURE: 97.6 F | HEIGHT: 67 IN | WEIGHT: 266 LBS | RESPIRATION RATE: 16 BRPM | DIASTOLIC BLOOD PRESSURE: 86 MMHG | HEART RATE: 80 BPM

## 2022-06-20 DIAGNOSIS — M25.561 ACUTE PAIN OF RIGHT KNEE: ICD-10-CM

## 2022-06-20 DIAGNOSIS — G47.00 INSOMNIA, UNSPECIFIED TYPE: ICD-10-CM

## 2022-06-20 DIAGNOSIS — Z00.00 ROUTINE GENERAL MEDICAL EXAMINATION AT A HEALTH CARE FACILITY: Primary | ICD-10-CM

## 2022-06-20 DIAGNOSIS — Z12.31 VISIT FOR SCREENING MAMMOGRAM: ICD-10-CM

## 2022-06-20 DIAGNOSIS — G43.909 MIGRAINE WITHOUT STATUS MIGRAINOSUS, NOT INTRACTABLE, UNSPECIFIED MIGRAINE TYPE: ICD-10-CM

## 2022-06-20 DIAGNOSIS — F32.1 MODERATE MAJOR DEPRESSION (H): ICD-10-CM

## 2022-06-20 DIAGNOSIS — Z12.11 SCREEN FOR COLON CANCER: ICD-10-CM

## 2022-06-20 DIAGNOSIS — S93.401D SPRAIN OF RIGHT ANKLE, UNSPECIFIED LIGAMENT, SUBSEQUENT ENCOUNTER: ICD-10-CM

## 2022-06-20 PROCEDURE — 99214 OFFICE O/P EST MOD 30 MIN: CPT | Mod: 25 | Performed by: NURSE PRACTITIONER

## 2022-06-20 PROCEDURE — 99396 PREV VISIT EST AGE 40-64: CPT | Performed by: NURSE PRACTITIONER

## 2022-06-20 RX ORDER — ZOLPIDEM TARTRATE 10 MG/1
TABLET ORAL
Qty: 30 TABLET | Refills: 5 | Status: SHIPPED | OUTPATIENT
Start: 2022-06-20 | End: 2022-12-27

## 2022-06-20 RX ORDER — BUTALBITAL, ASPIRIN, AND CAFFEINE 325; 50; 40 MG/1; MG/1; MG/1
CAPSULE ORAL
Qty: 20 CAPSULE | Refills: 5 | Status: SHIPPED | OUTPATIENT
Start: 2022-06-20 | End: 2022-09-27

## 2022-06-20 RX ORDER — MELOXICAM 7.5 MG/1
7.5 TABLET ORAL DAILY
Qty: 30 TABLET | Refills: 5 | Status: SHIPPED | OUTPATIENT
Start: 2022-06-20 | End: 2023-04-17

## 2022-06-20 RX ORDER — BUPROPION HYDROCHLORIDE 150 MG/1
TABLET ORAL
Qty: 270 TABLET | Refills: 3 | Status: SHIPPED | OUTPATIENT
Start: 2022-06-20 | End: 2022-09-27

## 2022-06-20 ASSESSMENT — ENCOUNTER SYMPTOMS
FEVER: 0
PARESTHESIAS: 0
EYE PAIN: 0
HEMATOCHEZIA: 0
NERVOUS/ANXIOUS: 0
BREAST MASS: 0
DIZZINESS: 0
ABDOMINAL PAIN: 0
SHORTNESS OF BREATH: 0
WEAKNESS: 0
HEADACHES: 0
COUGH: 0
CONSTIPATION: 0
DIARRHEA: 0
MYALGIAS: 1
CHILLS: 0
PALPITATIONS: 0
NAUSEA: 0
FREQUENCY: 0
JOINT SWELLING: 1
ARTHRALGIAS: 1
DYSURIA: 0
HEARTBURN: 0
HEMATURIA: 0
SORE THROAT: 0

## 2022-06-20 ASSESSMENT — ANXIETY QUESTIONNAIRES: GAD7 TOTAL SCORE: 5

## 2022-06-20 ASSESSMENT — PATIENT HEALTH QUESTIONNAIRE - PHQ9: SUM OF ALL RESPONSES TO PHQ QUESTIONS 1-9: 8

## 2022-06-20 NOTE — PROGRESS NOTES
SUBJECTIVE:   CC: Leandra Chris is an 47 year old woman who presents for preventive health visit.   Patient has been advised of split billing requirements and indicates understanding: Yes  Healthy Habits:     Getting at least 3 servings of Calcium per day:  Yes    Bi-annual eye exam:  NO    Dental care twice a year:  Yes    Sleep apnea or symptoms of sleep apnea:  Sleep apnea    Diet:  Regular (no restrictions)    Frequency of exercise:  2-3 days/week    Duration of exercise:  15-30 minutes    Taking medications regularly:  Yes    Medication side effects:  None    PHQ-2 Total Score: 2    Additional concerns today:  Yes  Her mood is stable.  She feels her depression is better with the summer and less stress with not working in the summer.  She is doing well on her current doses of medication.    She is unable to fall asleep unless she takes Ambien.    Her migraines are infrequent.      She sprained her right ankle on vacation in March.  She did go to [facility] and had one PT visit.  Her symptoms are better than they were, but still not back to normal.    She takes Meloxicam about once a week for various joint pains.         Today's PHQ-2 Score:   PHQ-2 ( 1999 Pfizer) 6/17/2022   Q1: Little interest or pleasure in doing things 1   Q2: Feeling down, depressed or hopeless 1   PHQ-2 Score 2   PHQ-2 Total Score (12-17 Years)- Positive if 3 or more points; Administer PHQ-A if positive -   Q1: Little interest or pleasure in doing things Several days   Q2: Feeling down, depressed or hopeless Several days   PHQ-2 Score 2       Abuse: Current or Past (Physical, Sexual or Emotional) - No  Do you feel safe in your environment? Yes    Have you ever done Advance Care Planning? (For example, a Health Directive, POLST, or a discussion with a medical provider or your loved ones about your wishes): No, advance care planning information given to patient to review.  Patient declined advance care planning discussion at this time.    Social  History     Tobacco Use     Smoking status: Former Smoker     Packs/day: 0.50     Years: 13.00     Pack years: 6.50     Types: Cigarettes, Clove cigarettes or kreteks, Cigarettes, Clove cigarettes or kreteks     Start date: 1/1/1999     Quit date: 2/1/2012     Years since quitting: 10.3     Smokeless tobacco: Never Used   Substance Use Topics     Alcohol use: Yes     Comment: rarely     If you drink alcohol do you typically have >3 drinks per day or >7 drinks per week? No    Alcohol Use 6/20/2022   Prescreen: >3 drinks/day or >7 drinks/week? -   Prescreen: >3 drinks/day or >7 drinks/week? No       Reviewed orders with patient.  Reviewed health maintenance and updated orders accordingly - Yes      Breast Cancer Screening:    Breast CA Risk Assessment (FHS-7) 6/17/2022   Do you have a family history of breast, colon, or ovarian cancer? No / Unknown           Pertinent mammograms are reviewed under the imaging tab.    History of abnormal Pap smear: NO - age 30-65 PAP every 5 years with negative HPV co-testing recommended  PAP / HPV Latest Ref Rng & Units 8/9/2018 6/30/2015 10/21/2011   PAP (Historical) - NIL NIL NIL   HPV16 NEG:Negative Negative - -   HPV18 NEG:Negative Negative - -   HRHPV NEG:Negative Negative - -     Reviewed and updated as needed this visit by clinical staff   Tobacco  Allergies  Meds                Reviewed and updated as needed this visit by Provider                       Review of Systems   Constitutional: Negative for chills and fever.   HENT: Positive for hearing loss. Negative for congestion, ear pain and sore throat.    Eyes: Negative for pain and visual disturbance.   Respiratory: Negative for cough and shortness of breath.    Cardiovascular: Negative for chest pain, palpitations and peripheral edema.   Gastrointestinal: Negative for abdominal pain, constipation, diarrhea, heartburn, hematochezia and nausea.   Breasts:  Negative for tenderness, breast mass and discharge.   Genitourinary:  "Positive for vaginal bleeding. Negative for dysuria, frequency, genital sores, hematuria, pelvic pain, urgency and vaginal discharge.   Musculoskeletal: Positive for arthralgias, joint swelling and myalgias.   Skin: Negative for rash.   Neurological: Negative for dizziness, weakness, headaches and paresthesias.   Psychiatric/Behavioral: Negative for mood changes. The patient is not nervous/anxious.           OBJECTIVE:   /86   Pulse 80   Temp 97.6  F (36.4  C) (Temporal)   Resp 16   Ht 1.689 m (5' 6.5\")   Wt 120.7 kg (266 lb)   LMP 06/09/2022 (Approximate)   SpO2 97%   Breastfeeding No   BMI 42.29 kg/m    Physical Exam  GENERAL: healthy, alert and no distress  EYES: Eyes grossly normal to inspection, PERRL and conjunctivae and sclerae normal  HENT: ear canals and TM's normal, nose and mouth without ulcers or lesions  NECK: no adenopathy, no asymmetry, masses, or scars and thyroid normal to palpation  RESP: lungs clear to auscultation - no rales, rhonchi or wheezes  CV: regular rate and rhythm, normal S1 S2, no S3 or S4, no murmur, click or rub, no peripheral edema and peripheral pulses strong  ABDOMEN: soft, nontender, no hepatosplenomegaly, no masses and bowel sounds normal  MS: no gross musculoskeletal defects noted, no edema  SKIN: no suspicious lesions or rashes  NEURO: Normal strength and tone, mentation intact and speech normal  PSYCH: mentation appears normal, affect normal        ASSESSMENT/PLAN:   (Z00.00) Routine general medical examination at a health care facility  (primary encounter diagnosis)  Comment:   Plan:     (S93.401D) Sprain of right ankle, unspecified ligament, subsequent encounter  Comment:   Plan: Physical Therapy Referral        Will refer to PT.     (G47.00) Insomnia, unspecified type  Comment: stable  Plan: zolpidem (AMBIEN) 10 MG tablet        The current medical regimen is effective;  continue present plan and medications.     (F32.1) Moderate major depression " "(H)  Comment: stable  Plan: buPROPion (WELLBUTRIN XL) 150 MG 24 hr tablet,         vortioxetine (TRINTELLIX) 20 MG tablet        The current medical regimen is effective;  continue present plan and medications.     (G43.909) Migraine without status migrainosus, not intractable, unspecified migraine type  Comment: stable  Plan: butalbital-aspirin-caffeine (FIORINAL)         -40 MG capsule        The current medical regimen is effective;  continue present plan and medications.     (M25.561) Acute pain of right knee  Comment: uses infrequently  Plan: meloxicam (MOBIC) 7.5 MG tablet        The current medical regimen is effective;  continue present plan and medications.     (Z12.11) Screen for colon cancer  Comment:   Plan: Adult Gastro Ref - Procedure Only            (Z12.31) Visit for screening mammogram  Comment:   Plan: MA SCREENING DIGITAL BILAT - Future  (s+30)                  COUNSELING:  Reviewed preventive health counseling, as reflected in patient instructions    Estimated body mass index is 42.29 kg/m  as calculated from the following:    Height as of this encounter: 1.689 m (5' 6.5\").    Weight as of this encounter: 120.7 kg (266 lb).    Weight management plan: Discussed healthy diet and exercise guidelines    She reports that she quit smoking about 10 years ago. Her smoking use included cigarettes, clove cigarettes or kreteks, cigarettes, and clove cigarettes or kreteks. She started smoking about 23 years ago. She has a 6.50 pack-year smoking history. She has never used smokeless tobacco.      Counseling Resources:  ATP IV Guidelines  Pooled Cohorts Equation Calculator  Breast Cancer Risk Calculator  BRCA-Related Cancer Risk Assessment: FHS-7 Tool  FRAX Risk Assessment  ICSI Preventive Guidelines  Dietary Guidelines for Americans, 2010  [employer]'s MyPlate  ASA Prophylaxis  Lung CA Screening    Nelli Nye NP  [facility]  "

## 2022-06-24 NOTE — TELEPHONE ENCOUNTER
Patient requesting refill on medication as pended below       Next office visit is scheduled for: 7/22/2022    RX for medications refilled per providers protocol and eprescribed to preferred pharmacy.   See 1/26/22 virtual visit.    NELA SantoroN, RN  [facility]

## 2022-06-29 ENCOUNTER — MYC MEDICAL ADVICE (OUTPATIENT)
Dept: FAMILY MEDICINE | Facility: CLINIC | Age: 47
End: 2022-06-29

## 2022-06-30 NOTE — TELEPHONE ENCOUNTER
"Rebecca-Please review and advise.    \"Hi! We chatted about my sweating issue at my appt last week. I missed a day of Wellbutrin and didn t sweat, and then looked up side effects and that is a common one. Is there something different Than Wellbutrin I could try? Or see if just the trintellix is effective alone? Or take something in addition to combat the sweating?\"    Writer responded via zulily.      Thank you!  ODALYS Santoro, RN  [facility]    "

## 2022-07-06 NOTE — TELEPHONE ENCOUNTER
Writer responded via Motor[employer].    NELA SantoroN, RN  [facility]

## 2022-07-06 NOTE — TELEPHONE ENCOUNTER
I would maybe start with seeing if a lower dose of Wellbutrin helps.  Try decreasing to 300 mg for a couple of weeks and see how that goes.

## 2022-07-22 ENCOUNTER — NURSE TRIAGE (OUTPATIENT)
Dept: NURSING | Facility: CLINIC | Age: 47
End: 2022-07-22

## 2022-07-22 PROCEDURE — 99284 EMERGENCY DEPT VISIT MOD MDM: CPT

## 2022-07-22 PROCEDURE — 93005 ELECTROCARDIOGRAM TRACING: CPT | Performed by: EMERGENCY MEDICINE

## 2022-07-22 PROCEDURE — 93005 ELECTROCARDIOGRAM TRACING: CPT | Performed by: STUDENT IN AN ORGANIZED HEALTH CARE EDUCATION/TRAINING PROGRAM

## 2022-07-23 ENCOUNTER — HOSPITAL ENCOUNTER (EMERGENCY)
Facility: HOSPITAL | Age: 47
Discharge: HOME | End: 2022-07-23
Attending: EMERGENCY MEDICINE | Admitting: EMERGENCY MEDICINE
Payer: COMMERCIAL

## 2022-07-23 VITALS
RESPIRATION RATE: 20 BRPM | OXYGEN SATURATION: 93 % | HEIGHT: 66 IN | HEART RATE: 98 BPM | WEIGHT: 265 LBS | BODY MASS INDEX: 42.59 KG/M2 | TEMPERATURE: 97.8 F | DIASTOLIC BLOOD PRESSURE: 61 MMHG | SYSTOLIC BLOOD PRESSURE: 130 MMHG

## 2022-07-23 DIAGNOSIS — U07.1 INFECTION DUE TO 2019 NOVEL CORONAVIRUS: ICD-10-CM

## 2022-07-23 LAB
ATRIAL RATE - MUSE: 99 BPM
DIASTOLIC BLOOD PRESSURE - MUSE: NORMAL MMHG
INTERPRETATION ECG - MUSE: NORMAL
P AXIS - MUSE: 47 DEGREES
PR INTERVAL - MUSE: 136 MS
QRS DURATION - MUSE: 94 MS
QT - MUSE: 348 MS
QTC - MUSE: 446 MS
R AXIS - MUSE: 18 DEGREES
SYSTOLIC BLOOD PRESSURE - MUSE: NORMAL MMHG
T AXIS - MUSE: 16 DEGREES
VENTRICULAR RATE- MUSE: 99 BPM

## 2022-07-23 RX ORDER — BENZONATATE 200 MG/1
200 CAPSULE ORAL 3 TIMES DAILY PRN
Qty: 21 CAPSULE | Refills: 0 | Status: SHIPPED | OUTPATIENT
Start: 2022-07-23 | End: 2022-09-27

## 2022-07-23 RX ORDER — ONDANSETRON 4 MG/1
4 TABLET, ORALLY DISINTEGRATING ORAL EVERY 8 HOURS PRN
Qty: 21 TABLET | Refills: 0 | Status: SHIPPED | OUTPATIENT
Start: 2022-07-23 | End: 2022-09-27

## 2022-07-23 ASSESSMENT — ENCOUNTER SYMPTOMS
SORE THROAT: 0
CONFUSION: 0
VOMITING: 0
FEVER: 1
NAUSEA: 1
COUGH: 1
DIARRHEA: 0
PALPITATIONS: 1
DYSURIA: 0
SHORTNESS OF BREATH: 1
ABDOMINAL PAIN: 0
DIZZINESS: 0
CHILLS: 0
JOINT SWELLING: 0
HEMATURIA: 0

## 2022-07-23 NOTE — ED PROVIDER NOTES
Emergency Department Encounter     Evaluation Date & Time:   No admission date for patient encounter.    CHIEF COMPLAINT:  Fever (Temp of 103 at home, took APAP now afebrile in triage), Covid Concern (Pt tested positive for Covid today.), and Palpitations (Pt's HR was in the 120's while walking, WNL now)      Triage Note:  Pt reports testing positive for covid today, has had a fever today, highest at 103.  Pt has been alternating APAP and Ibuprofen.  Pt states she was having palpitations and was tachycardic in the 120's.  Pt is not tachycardic right now.  Pt is now afebrile but is sweating.                ED COURSE & MEDICAL DECISION MAKING:        Pt here after she called nurse triage line and was instructed to come to ED for her covid 19 infection with fever/tachycardia at home.  Symptoms improved with Tylenol/ibuprofen at home.  Pt reports symptoms starting late last night, took test today that was +.  She was having palpitations/chest tightness today.  EKG unremarkable, lungs clear, no hypoxia and well appearing here.  Discussed with pt symptomatic cares. She asked about paxlovid, but she does not qualify per severity score.  She requested molnupiravir, so I sent a prescription after discussing risks/benefits of antiviral medication.  I'm also sending zofran for some nausea, tessalon perles.  Pt understands follow up and return precautions.      12:28 AM I met with the patient to gather history and to perform my initial exam. I discussed the plan for care while in the Emergency Department. PPE (gloves, N95 mask) was worn during patient encounters. I discussed the plan for discharge with the patient, and patient is agreeable. We discussed supportive cares at home and reasons for return to the ER including new or worsening symptoms - all questions and concerns addressed. Discharged patient in stable condition.       At the conclusion of the encounter I discussed the results of all the tests and the disposition.  The questions were answered. The patient or family acknowledged understanding and was agreeable with the care plan.      MEDICATIONS GIVEN IN THE EMERGENCY DEPARTMENT:  Medications - No data to display    NEW PRESCRIPTIONS STARTED AT TODAY'S ED VISIT:  New Prescriptions    BENZONATATE (TESSALON) 200 MG CAPSULE    Take 1 capsule (200 mg) by mouth 3 times daily as needed for cough    MOLNUPIRAVIR (LAGEVRIO) 200 MG CAPSULE    Take 4 capsules (800 mg) by mouth every 12 hours    ONDANSETRON (ZOFRAN ODT) 4 MG ODT TAB    Take 1 tablet (4 mg) by mouth every 8 hours as needed for nausea       HPI   The history is provided by the patient. No  was used.        Leandra Chris is a 47 year old female with a pertinent history of obesity, and asthma, who presents to this ED by private vehicle for evaluation of COVID-19 symptoms. Patient states that she tested positive for COVID today, and has had symptoms that started last night. Her symptoms include body aches, cough, shortness of breath, congestion, chest tightness with palitations, and nausea. Patient called a nurse line tonight, because her temperature was 103 F and her heart rate was in the 120's. The nurse instructed the patient to present to the ED within the next 4 hours. Symptoms currently improved with resolution of fever.    Patient has been taking Tylenol and Advil for the pain and fever. Patient is vaccinated and boosted 1x against COVID-19.     Patient denies syncope, abdominal pain, or additional medical concerns or complaints at this time.       REVIEW OF SYSTEMS:  Review of Systems   Constitutional: Positive for fever. Negative for chills.        Positive for body aches   HENT: Positive for congestion. Negative for sore throat.    Eyes: Negative for visual disturbance.   Respiratory: Positive for cough and shortness of breath.    Cardiovascular: Positive for chest pain (tightness) and palpitations.   Gastrointestinal: Positive for nausea.  Negative for abdominal pain, diarrhea and vomiting.   Endocrine: Negative for polyuria.   Genitourinary: Negative for dysuria and hematuria.        - urinary changes     Musculoskeletal: Negative for joint swelling.   Skin: Negative for rash.   Neurological: Negative for dizziness and syncope.   Psychiatric/Behavioral: Negative for confusion.   All other systems reviewed and are negative.          Medical History     Past Medical History:   Diagnosis Date     Conductive hearing loss 01/30/2020     Depressive disorder      Hoarseness 12/15/2019     LSIL (low grade squamous intraepithelial lesion) on Pap smear 2008     Migraines      Mild major depression (H) 07/31/2018     Obstructive sleep apnea 01/2011     PCOS (polycystic ovarian syndrome)      Pneumonia      Sensorineural hearing loss 01/30/2020       Past Surgical History:   Procedure Laterality Date     NO HISTORY OF SURGERY         Family History   Problem Relation Age of Onset     Prostate Cancer Father      Snoring Father      Asthma Mother      Chronic Obstructive Pulmonary Disease Mother      Depression Mother      Migraines Sister      Depression Sister      Anxiety Disorder Sister      Substance Abuse Brother      Depression Brother      Hypertension Maternal Grandmother      Cerebrovascular Disease Maternal Grandmother         Several mini strokes     Dementia Maternal Grandmother      Cerebrovascular Disease Other         Massive stroke     Other Cancer Cousin         Brain cancer     Other Cancer Paternal Grandmother         Brain cancer     Depression Niece      Anxiety Disorder Niece      C.A.D. No family hx of      Diabetes No family hx of      Breast Cancer No family hx of      Cancer - colorectal No family hx of        Social History     Tobacco Use     Smoking status: Former Smoker     Packs/day: 0.50     Years: 13.00     Pack years: 6.50     Types: Cigarettes, Clove cigarettes or kreteks, Cigarettes, Clove cigarettes or kreteks     Start date:  "1/1/1999     Quit date: 2/1/2012     Years since quitting: 10.4     Smokeless tobacco: Never Used   Vaping Use     Vaping Use: Never used   Substance Use Topics     Alcohol use: Yes     Comment: rarely     Drug use: No       benzonatate (TESSALON) 200 MG capsule  molnupiravir (LAGEVRIO) 200 MG capsule  ondansetron (ZOFRAN ODT) 4 MG ODT tab  Acetaminophen (TYLENOL PO)  ALPRAZolam (XANAX) 0.5 MG tablet  buPROPion (WELLBUTRIN XL) 150 MG 24 hr tablet  butalbital-aspirin-caffeine (FIORINAL) -40 MG capsule  meloxicam (MOBIC) 7.5 MG tablet  vortioxetine (TRINTELLIX) 20 MG tablet  zolpidem (AMBIEN) 10 MG tablet        Physical Exam     Vitals:  /61   Pulse 98   Temp 97.8  F (36.6  C) (Temporal)   Resp 20   Ht 1.676 m (5' 6\")   Wt 120.2 kg (265 lb)   LMP 06/09/2022 (Approximate)   SpO2 93%   BMI 42.77 kg/m      PHYSICAL EXAM:   Physical Exam  Vitals and nursing note reviewed.   Constitutional:       General: She is not in acute distress.     Appearance: Normal appearance.   HENT:      Head: Normocephalic and atraumatic.      Nose: Nose normal.      Mouth/Throat:      Mouth: Mucous membranes are moist.   Eyes:      Pupils: Pupils are equal, round, and reactive to light.   Neck:      Vascular: No JVD.   Cardiovascular:      Rate and Rhythm: Normal rate and regular rhythm.      Pulses: Normal pulses.           Radial pulses are 2+ on the right side and 2+ on the left side.        Dorsalis pedis pulses are 2+ on the right side and 2+ on the left side.   Pulmonary:      Effort: Pulmonary effort is normal. No respiratory distress.      Breath sounds: Normal breath sounds.   Abdominal:      Palpations: Abdomen is soft.      Tenderness: There is no abdominal tenderness.   Musculoskeletal:      Cervical back: Full passive range of motion without pain and neck supple.      Comments: No calf tenderness or swelling b/l   Skin:     General: Skin is warm.      Findings: No rash.   Neurological:      General: No focal " deficit present.      Mental Status: She is alert. Mental status is at baseline.      Comments: Fluent speech, no acute lateralizing deficits   Psychiatric:         Mood and Affect: Mood normal.         Behavior: Behavior normal.       Results     LAB:  All pertinent labs reviewed and interpreted  Labs Ordered and Resulted from Time of ED Arrival to Time of ED Departure - No data to display    RADIOLOGY:  No orders to display                ECG:  NSR, rate 99, normal intervals, no acute ischemia, unchanged from previous    I have independently reviewed and interpreted the EKG(s) documented above     PROCEDURES:  Procedures:  none      FINAL IMPRESSION:    ICD-10-CM    1. Infection due to 2019 novel coronavirus  U07.1        0 minutes of critical care time      I, Elise Gryler, am serving as a scribe to document services personally performed by Dr. Sukhdeep Asencio, based on my observations and the provider's statements to me. I, Sukhdeep Asencio, DO attest that Elise Gryler is acting in a scribe capacity, has observed my performance of the services and has documented them in accordance with my direction.      Sukhdeep Asencio DO  Emergency Medicine  [facility] EMERGENCY DEPARTMENT  7/23/2022  12:37 AM          Sukhdeep Asencio MD  07/23/22 0049

## 2022-07-23 NOTE — ED TRIAGE NOTES
Pt reports testing positive for covid today, has had a fever today, highest at 103.  Pt has been alternating APAP and Ibuprofen.  Pt states she was having palpitations and was tachycardic in the 120's.  Pt is not tachycardic right now.  Pt is now afebrile but is sweating.      Triage Assessment     Row Name 07/22/22 9718       Triage Assessment (Adult)    Airway WDL WDL       Respiratory WDL    Respiratory WDL WDL       Skin Circulation/Temperature WDL    Skin Circulation/Temperature WDL WDL       Cardiac WDL    Cardiac WDL WDL       Peripheral/Neurovascular WDL    Peripheral Neurovascular WDL WDL       Cognitive/Neuro/Behavioral WDL    Cognitive/Neuro/Behavioral WDL WDL

## 2022-07-23 NOTE — DISCHARGE INSTRUCTIONS
Take antiviral monupiravir as directed until gone. Take tessalon perles for cough, zofran for nausea. Follow up with primary clinic.

## 2022-07-23 NOTE — TELEPHONE ENCOUNTER
S: Symptoms started on 7/21         .  Covid test done on 7/22 at home was positive.    Has gotten 3 Pfizer covid vaccinations.     Current symptoms are:    Fever 103.2 (forehead) Has been taking Advil and Tylenol all day long     Chills    Cough dry    Shortness of breath at rest and with activity    Generalized body aches, severe    Stuffy nose    Nausea    Chest tightness    O2 saturation 94-97%    Pulse 102    Chest pressure    Fatigue    A: Per protocol to been seen within 4 hours.  Writer paged Dr. Bairon Almonte on call PCP.  R: He would like patient assessed in the ED.  Writer called patient back she will go into the ED.    Madison Schmitz RN, [facility] Triage Nurse Advisor    Reason for Disposition    MILD difficulty breathing (e.g., minimal/no SOB at rest, SOB with walking, pulse <100)    Fever > 103 F (39.4 C)    Additional Information    Negative: SEVERE difficulty breathing (e.g., struggling for each breath, speaks in single words)    Negative: Difficult to awaken or acting confused (e.g., disoriented, slurred speech)    Negative: Bluish (or gray) lips or face now    Negative: Shock suspected (e.g., cold/pale/clammy skin, too weak to stand, low BP, rapid pulse)    Negative: Sounds like a life-threatening emergency to the triager    Negative: SEVERE or constant chest pain or pressure  (Exception: Mild central chest pain, present only when coughing.)    Negative: MODERATE difficulty breathing (e.g., speaks in phrases, SOB even at rest, pulse 100-120)    Negative: [1] Headache AND [2] stiff neck (can't touch chin to chest)    Negative: Oxygen level (e.g., pulse oximetry) 90 percent or lower    Negative: Chest pain or pressure    Negative: Patient sounds very sick or weak to the triager    Protocols used: CORONAVIRUS (COVID-19) DIAGNOSED OR [id number] 1.18.2022     COVID 19 Nurse Triage Plan/Patient Instructions    Please be aware that novel coronavirus (COVID-19) may be circulating in the  community. If you develop symptoms such as fever, cough, or SOB or if you have concerns about the presence of another infection including coronavirus (COVID-19), please contact your health care provider or visit [email or web address].     Disposition/Instructions    ED Visit recommended. Follow protocol based instructions.     Bring Your Own Device:  Please also bring your smart device(s) (smart phones, tablets, laptops) and their charging cables for your personal use and to communicate with your care team during your visit.    Thank you for taking steps to prevent the spread of this virus.  o Limit your contact with others.  o Wear a simple mask to cover your cough.  o Wash your hands well and often.    Resources    [facility]: About COVID-19: [email or web address]    CDC: What to Do If You're Sick: www.cdc.gov/coronavirus/2019-ncov/about/steps-when-sick.html    CDC: Ending Home Isolation: www.cdc.gov/coronavirus/2019-ncov/hcp/disposition-in-home-patients.html     CDC: Caring for Someone: www.cdc.gov/coronavirus/2019-ncov/if-you-are-sick/care-for-someone.html     [facility]: Interim Guidance for Hospital Discharge to Home: [email or web address]/hcp/hospdischarge.pdf    [facility] clinical trials (COVID-19 research studies): [email or web address].[facility]-clinical-trials     Below are the COVID-19 hotlines at the [facility] ([facility]). Interpreters are available.   o For health questions: Call 097-884-8481 or 1-397.137.2746 (7 a.m. to 7 p.m.)  o For questions about schools and childcare: Call 038-397-2775 or 1-958.205.7849 (7 a.m. to 7 p.m.)

## 2022-07-24 ENCOUNTER — NURSE TRIAGE (OUTPATIENT)
Dept: NURSING | Facility: CLINIC | Age: 47
End: 2022-07-24

## 2022-07-24 NOTE — TELEPHONE ENCOUNTER
Triage Call    Pt says she has Covid-19. Test was positive on 7/22/22. Talked to ED doctor who told her she wouldn't qualify for covid treatment.    Pt calling with concern about O2 Sats dropping below 90 while she sleeps. Says that she normally wears a c-pap over her nose for Sleep Apnea but her nose is so congested that she can't wear it.    Advised pt to contact her Sleep-Apnea equiptment provider to ask to get a full mask.    Pt says current Covid symptoms include nasal congestion, body aches, a cough, and difficulty taking a deep  breath at times. Says she has been using her inhaler if her breathing feels tight and it makes it feel easier.  O2 Sats have been low 90's while awake.    Triaged to disposition of Home Care, and Care advice given per Covid-19 Diagnosed or Suspected Guideline.  Pt also advised to go to  today if Sats start dropping or inhaler less effective or cough worsens.    Cheri Chaparro RN      Reason for Disposition    [1] COVID-19 diagnosed by positive lab test (e.g., PCR, rapid self-test kit) AND [2] mild symptoms (e.g., cough, fever, others) AND [3] no complications or SOB    Additional Information    Negative: SEVERE difficulty breathing (e.g., struggling for each breath, speaks in single words)    Negative: Difficult to awaken or acting confused (e.g., disoriented, slurred speech)    Negative: Bluish (or gray) lips or face now    Negative: Shock suspected (e.g., cold/pale/clammy skin, too weak to stand, low BP, rapid pulse)    Negative: Sounds like a life-threatening emergency to the triager    Negative: [1] Diagnosed or suspected COVID-19 AND [2] symptoms lasting 3 or more weeks    Negative: [1] COVID-19 exposure AND [2] no symptoms    Negative: COVID-19 vaccine reaction suspected (e.g., fever, headache, muscle aches) occurring 1 to 3 days after getting vaccine    Negative: COVID-19 vaccine, questions about    Negative: [1] Lives with someone known to have influenza (flu test  positive) AND [2] flu-like symptoms (e.g., cough, runny nose, sore throat, SOB; with or without fever)    Negative: [1] Adult with possible COVID-19 symptoms AND [2] triager concerned about severity of symptoms or other causes    Negative: COVID-19 and breastfeeding, questions about    Negative: SEVERE or constant chest pain or pressure  (Exception: Mild central chest pain, present only when coughing.)    Negative: MODERATE difficulty breathing (e.g., speaks in phrases, SOB even at rest, pulse 100-120)    Negative: [1] Headache AND [2] stiff neck (can't touch chin to chest)    Negative: Oxygen level (e.g., pulse oximetry) 90 percent or lower    Negative: Chest pain or pressure    Negative: Patient sounds very sick or weak to the triager    Negative: MILD difficulty breathing (e.g., minimal/no SOB at rest, SOB with walking, pulse <100)    Negative: Fever > 103 F (39.4 C)    Negative: [1] Fever > 101 F (38.3 C) AND [2] age > 60 years    Negative: [1] Fever > 100.0 F (37.8 C) AND [2] bedridden (e.g., nursing home patient, CVA, chronic illness, recovering from surgery)    Negative: HIGH RISK for severe COVID complications (e.g., weak immune system, age > 64 years, obesity with BMI > 25, pregnant, chronic lung disease or other chronic medical condition)  (Exception: Already seen by PCP and no new or worsening symptoms.)    Negative: [1] HIGH RISK patient AND [2] influenza is widespread in the community AND [3] ONE OR MORE respiratory symptoms: cough, sore throat, runny or stuffy nose    Negative: [1] HIGH RISK patient AND [2] influenza exposure within the last 7 days AND [3] ONE OR MORE respiratory symptoms: cough, sore throat, runny or stuffy nose    Negative: Oxygen level (e.g., pulse oximetry) 91 to 94 percent    Negative: Fever present > 3 days (72 hours)    Negative: [1] Fever returns after gone for over 24 hours AND [2] symptoms worse or not improved    Negative: [1] Continuous (nonstop) coughing interferes with  work or school AND [2] no improvement using cough treatment per Care Advice    Negative: [1] COVID-19 infection suspected by caller or triager AND [2] mild symptoms (cough, fever, or others) AND [3] negative COVID-19 rapid test    Negative: Cough present > 3 weeks    Negative: [1] COVID-19 diagnosed by positive lab test (e.g., PCR, rapid self-test kit) AND [2] NO symptoms (e.g., cough, fever, others)    Protocols used: CORONAVIRUS (COVID-19) DIAGNOSED OR [id number] 1.18.2022

## 2022-07-25 ENCOUNTER — HOSPITAL ENCOUNTER (EMERGENCY)
Facility: CLINIC | Age: 47
Discharge: HOME | End: 2022-07-25
Attending: EMERGENCY MEDICINE | Admitting: EMERGENCY MEDICINE
Payer: COMMERCIAL

## 2022-07-25 ENCOUNTER — NURSE TRIAGE (OUTPATIENT)
Dept: FAMILY MEDICINE | Facility: CLINIC | Age: 47
End: 2022-07-25

## 2022-07-25 ENCOUNTER — MYC MEDICAL ADVICE (OUTPATIENT)
Dept: FAMILY MEDICINE | Facility: CLINIC | Age: 47
End: 2022-07-25

## 2022-07-25 ENCOUNTER — APPOINTMENT (OUTPATIENT)
Dept: GENERAL RADIOLOGY | Facility: CLINIC | Age: 47
End: 2022-07-25
Attending: NURSE PRACTITIONER
Payer: COMMERCIAL

## 2022-07-25 VITALS
HEART RATE: 73 BPM | OXYGEN SATURATION: 97 % | HEIGHT: 66 IN | DIASTOLIC BLOOD PRESSURE: 79 MMHG | SYSTOLIC BLOOD PRESSURE: 121 MMHG | RESPIRATION RATE: 16 BRPM | BODY MASS INDEX: 42.59 KG/M2 | WEIGHT: 265 LBS | TEMPERATURE: 97.5 F

## 2022-07-25 DIAGNOSIS — G47.30 SLEEP APNEA, UNSPECIFIED TYPE: ICD-10-CM

## 2022-07-25 DIAGNOSIS — U07.1 INFECTION DUE TO 2019 NOVEL CORONAVIRUS: ICD-10-CM

## 2022-07-25 LAB
ALBUMIN SERPL BCG-MCNC: 4 G/DL (ref 3.5–5.2)
ALP SERPL-CCNC: 95 U/L (ref 35–104)
ALT SERPL W P-5'-P-CCNC: 20 U/L (ref 10–35)
ANION GAP SERPL CALCULATED.3IONS-SCNC: 9 MMOL/L (ref 7–15)
AST SERPL W P-5'-P-CCNC: 27 U/L (ref 10–35)
ATRIAL RATE - MUSE: 62 BPM
BASOPHILS # BLD AUTO: 0 10E3/UL (ref 0–0.2)
BASOPHILS NFR BLD AUTO: 1 %
BILIRUB SERPL-MCNC: 0.2 MG/DL
BUN SERPL-MCNC: 12 MG/DL (ref 6–20)
CALCIUM SERPL-MCNC: 8.8 MG/DL (ref 8.6–10)
CHLORIDE SERPL-SCNC: 105 MMOL/L (ref 98–107)
CREAT SERPL-MCNC: 0.65 MG/DL (ref 0.51–0.95)
D DIMER PPP FEU-MCNC: 0.49 UG/ML FEU (ref 0–0.5)
DEPRECATED HCO3 PLAS-SCNC: 26 MMOL/L (ref 22–29)
DIASTOLIC BLOOD PRESSURE - MUSE: NORMAL MMHG
EOSINOPHIL # BLD AUTO: 0.2 10E3/UL (ref 0–0.7)
EOSINOPHIL NFR BLD AUTO: 4 %
ERYTHROCYTE [DISTWIDTH] IN BLOOD BY AUTOMATED COUNT: 13 % (ref 10–15)
GFR SERPL CREATININE-BSD FRML MDRD: >90 ML/MIN/1.73M2
GLUCOSE SERPL-MCNC: 91 MG/DL (ref 70–99)
HCG SERPL QL: NEGATIVE
HCT VFR BLD AUTO: 43.7 % (ref 35–47)
HGB BLD-MCNC: 13.9 G/DL (ref 11.7–15.7)
IMM GRANULOCYTES # BLD: 0 10E3/UL
IMM GRANULOCYTES NFR BLD: 0 %
INTERPRETATION ECG - MUSE: NORMAL
LYMPHOCYTES # BLD AUTO: 2 10E3/UL (ref 0.8–5.3)
LYMPHOCYTES NFR BLD AUTO: 40 %
MCH RBC QN AUTO: 29.6 PG (ref 26.5–33)
MCHC RBC AUTO-ENTMCNC: 31.8 G/DL (ref 31.5–36.5)
MCV RBC AUTO: 93 FL (ref 78–100)
MONOCYTES # BLD AUTO: 0.7 10E3/UL (ref 0–1.3)
MONOCYTES NFR BLD AUTO: 13 %
NEUTROPHILS # BLD AUTO: 2.1 10E3/UL (ref 1.6–8.3)
NEUTROPHILS NFR BLD AUTO: 42 %
NRBC # BLD AUTO: 0 10E3/UL
NRBC BLD AUTO-RTO: 0 /100
P AXIS - MUSE: 24 DEGREES
PLATELET # BLD AUTO: 255 10E3/UL (ref 150–450)
POTASSIUM SERPL-SCNC: 4.4 MMOL/L (ref 3.4–5.3)
PR INTERVAL - MUSE: 132 MS
PROT SERPL-MCNC: 7.2 G/DL (ref 6.4–8.3)
QRS DURATION - MUSE: 86 MS
QT - MUSE: 404 MS
QTC - MUSE: 410 MS
R AXIS - MUSE: -1 DEGREES
RBC # BLD AUTO: 4.7 10E6/UL (ref 3.8–5.2)
SODIUM SERPL-SCNC: 140 MMOL/L (ref 136–145)
SYSTOLIC BLOOD PRESSURE - MUSE: NORMAL MMHG
T AXIS - MUSE: 9 DEGREES
VENTRICULAR RATE- MUSE: 62 BPM
WBC # BLD AUTO: 5 10E3/UL (ref 4–11)

## 2022-07-25 PROCEDURE — 99285 EMERGENCY DEPT VISIT HI MDM: CPT | Mod: 25 | Performed by: EMERGENCY MEDICINE

## 2022-07-25 PROCEDURE — 93010 ELECTROCARDIOGRAM REPORT: CPT | Performed by: EMERGENCY MEDICINE

## 2022-07-25 PROCEDURE — 85025 COMPLETE CBC W/AUTO DIFF WBC: CPT | Performed by: NURSE PRACTITIONER

## 2022-07-25 PROCEDURE — 84703 CHORIONIC GONADOTROPIN ASSAY: CPT | Performed by: NURSE PRACTITIONER

## 2022-07-25 PROCEDURE — 36415 COLL VENOUS BLD VENIPUNCTURE: CPT | Performed by: NURSE PRACTITIONER

## 2022-07-25 PROCEDURE — 85379 FIBRIN DEGRADATION QUANT: CPT | Performed by: NURSE PRACTITIONER

## 2022-07-25 PROCEDURE — 93005 ELECTROCARDIOGRAM TRACING: CPT | Performed by: EMERGENCY MEDICINE

## 2022-07-25 PROCEDURE — 80053 COMPREHEN METABOLIC PANEL: CPT | Performed by: NURSE PRACTITIONER

## 2022-07-25 PROCEDURE — 71046 X-RAY EXAM CHEST 2 VIEWS: CPT

## 2022-07-25 PROCEDURE — 71046 X-RAY EXAM CHEST 2 VIEWS: CPT | Mod: 26 | Performed by: RADIOLOGY

## 2022-07-25 RX ORDER — BUPROPION HYDROCHLORIDE 150 MG/1
3 TABLET ORAL
COMMUNITY
Start: 2022-01-12 | End: 2022-09-27

## 2022-07-25 RX ORDER — OXYMETAZOLINE HYDROCHLORIDE 0.05 G/100ML
2 SPRAY NASAL
Qty: 37 ML | Refills: 0 | Status: SHIPPED | OUTPATIENT
Start: 2022-07-25 | End: 2022-09-27

## 2022-07-25 RX ORDER — ALPRAZOLAM 0.5 MG
.5-1 TABLET ORAL
COMMUNITY
Start: 2022-02-23 | End: 2022-09-27

## 2022-07-25 ASSESSMENT — ENCOUNTER SYMPTOMS: SHORTNESS OF BREATH: 1

## 2022-07-25 NOTE — ED TRIAGE NOTES
"ED Triage Provider Note  [facility]  Encounter Date: Jul 25, 2022    History:  No chief complaint on file.    Leandra Chris is a 47 year old female who presents to the ED with low O2 sats when lying down. Patient reports fever started Friday, tested positive for covid 19 with elevated HR. Was seen in ED on 7/23, started on Molnupiravir which she has been taking. Denies chest pain, chest feels tight \"hard to take a deep breath\". Not wheezing. No fevers today. Unable to use her CPAP while sleeping due to nasal congestion. Home O2 monitor readings in mid 80%s when lying down for a while. Called her clinic who told her to come in to the ED>    Review of Systems:  Resp: positive for shortness of breath   CV: positive for chest tightness    Exam:  /82   Pulse 73   Temp 97.5  F (36.4  C) (Oral)   Resp 18   Ht 1.676 m (5' 6\")   Wt 120.2 kg (265 lb)   LMP 06/09/2022 (Approximate)   SpO2 96%   BMI 42.77 kg/m    General: No acute distress. Appears stated age.   HEENT: congested, no sinus tenderness  Cardio: Regular rate, extremities well perfused  Resp: Normal work of breathing, grossly normal respiratory rate  Neuro: Alert. CN II-XII grossly intact. Grossly intact strength.   Abd: soft, nontender, nondistended    Procedure note:      Phlebotomy  Performed by EDISON Connors CNP  Patient Identity confirmed: Yes  Risks, benefits and alternatives were discussed  Consent given by: Patient  Indication for Exam: labs  Vein/Location: left AC  Catheter Size: 21g butterfly  Number of Attempts: 1  Successful: yes  Complications: none        Medical Decision Making:  Patient arriving to the ED with problem as above. A medical screening exam was performed. Cbc, cmp, lactate, EKG, troponin, CXR orders initiated from Triage. The patient is appropriate to wait in triage.      EDISON Connors CNP on 7/25/2022 at 4:24 PM  "

## 2022-07-25 NOTE — TELEPHONE ENCOUNTER
RN will call pt as she has low sats and has covid  See phone encounter of today        Aaliyah Mckeon RN, BSN  [facility]

## 2022-07-25 NOTE — TELEPHONE ENCOUNTER
Detailed message left on pt's vm due to emergent situation. She must go to ED. This was left on her voice mail  I will try again to reach her to make sure she got message.  I will write back to her on mychart as well    Aaliyah Mckeon, RN, BSN  [facility]

## 2022-07-25 NOTE — DISCHARGE INSTRUCTIONS
Thank you for coming to the [facility] Emergency Department.     Labs, chest xray are reassuring.   Oxygen is appropriate with walking, sitting and laying down.    For nasal congestion that is interfering with using your CPAP please do the following:  Call your home CPAP supplier and ask if it is possible to get sent a full facemask to use.   While waiting to connect with your CPAP supplier, we will have you do a regimen of nasal decongestion. Start with Nyquil (available over the counter, see bottle for recommended dose) 30 minutes prior to bedtime. Then before bed, apply several sprays of nasal saline (available over the counter) to each nostil to loosen secretions and then blow your nose to clear it. Last, apply topical oxymetazoline spray to both nostrils. Then put on your CPAP.     Return to the ER if oxygen is less than 90 when awake, or if you are not tolerating basic activities.

## 2022-07-25 NOTE — ED TRIAGE NOTES
47 yr old female ambulatory to ED presenting with dizziness, weakness, low PO2 when laying down - dips into the 80s. Unless she sits up or stands up she reports her PO2 will not increase. First tested positive for COVID this past Friday. Fevers. Fully vaccinated and booster for COVID. Clinic told her to come to ED. Supposed to where a CPAP but has been unable to do this due to congestion.      Triage Assessment     Row Name 07/25/22 1534       Triage Assessment (Adult)    Airway WDL WDL       Respiratory WDL    Respiratory WDL WDL       Skin Circulation/Temperature WDL    Skin Circulation/Temperature WDL WDL       Cardiac WDL    Cardiac WDL WDL       Peripheral/Neurovascular WDL    Peripheral Neurovascular WDL WDL    Capillary Refill, General less than/equal to 3 secs       Cognitive/Neuro/Behavioral WDL    Cognitive/Neuro/Behavioral WDL WDL       Critz Coma Scale    Best Eye Response 4-->(E4) spontaneous    Best Motor Response 6-->(M6) obeys commands    Best Verbal Response 5-->(V5) oriented    Francine Coma Scale Score 15

## 2022-07-25 NOTE — TELEPHONE ENCOUNTER
Patient needs to go to the ER to make sure there is no blood clot or heart failure or pneumonia contributing to the low oxygen saturation and after that oxygen of course can be arranged for at home.  It is  tricky with kids at home can she ask for help and would not be best clinical practice to just prescribe oxygen without being evaluated for reason for low oxygen

## 2022-07-25 NOTE — TELEPHONE ENCOUNTER
.Nurse Triage SBAR    Is this a 2nd Level Triage? YES, LICENSED PRACTITIONER REVIEW IS REQUIRED    Situation:  seen Friday 7/22 for Covid, sx started 7/21. Given molnupirivir. Called nurseline yesterday, told to come to  at . She came here and told to go to ED. Went to ED but left without even coming in due to parking issues.   Today sats hard to get above 90 unless she is sitting up. Feels dizzy even when sitting.     Background:  .Seen in ED on 7/22 and given the antiviral  She came to  at  yesterday and told to go to ED. Went to ED but left without even coming in due to parking issues.   She is worried about leaving home, has 3 and 8 year old, no one to watch kids, her wife very sick as well    Assessment:  needs ED due to dizziness and low sats, below 90 unless sitting up, may need 02    Protocol Recommended Disposition:      GO to ED or discuss with PCP    Recommendation:    Do you  Agree with advice to go to ED, RN not sure pt will go due to kids, would virtual visit allow her to get home 02 considering her home readings?    Routed to provider    Does the patient meet one of the following criteria for ADS visit consideration? 16+ years old, with an [facility] PCP     TIP  Providers, please consider if this condition is appropriate for management at one of our Acute and Diagnostic Services sites.     If patient is a good candidate, please use dotphrase <dot>triageresponse and select Refer to ADS to document.

## 2022-07-25 NOTE — TELEPHONE ENCOUNTER
Pt did read [employer] message below which I sent her after phone conversation advising her to go to ED    This encounter will be closed    Aaliyah Mckeon RN

## 2022-07-25 NOTE — TELEPHONE ENCOUNTER
"    Reason for Disposition    Patient sounds very sick or weak to the triager    Additional Information    Negative: SEVERE difficulty breathing (e.g., struggling for each breath, speaks in single words)    Negative: Difficult to awaken or acting confused (e.g., disoriented, slurred speech)    Negative: Bluish (or gray) lips or face now    Negative: Shock suspected (e.g., cold/pale/clammy skin, too weak to stand, low BP, rapid pulse)    Negative: Sounds like a life-threatening emergency to the triager    Negative: [1] Diagnosed or suspected COVID-19 AND [2] symptoms lasting 3 or more weeks    Negative: [1] COVID-19 exposure AND [2] no symptoms    Negative: COVID-19 vaccine reaction suspected (e.g., fever, headache, muscle aches) occurring 1 to 3 days after getting vaccine    Negative: COVID-19 vaccine, questions about    Negative: [1] Lives with someone known to have influenza (flu test positive) AND [2] flu-like symptoms (e.g., cough, runny nose, sore throat, SOB; with or without fever)    Negative: [1] Adult with possible COVID-19 symptoms AND [2] triager concerned about severity of symptoms or other causes    Negative: COVID-19 and breastfeeding, questions about    Negative: SEVERE or constant chest pain or pressure  (Exception: Mild central chest pain, present only when coughing.)    Negative: MODERATE difficulty breathing (e.g., speaks in phrases, SOB even at rest, pulse 100-120)    Negative: [1] Headache AND [2] stiff neck (can't touch chin to chest)    Negative: Oxygen level (e.g., pulse oximetry) 90 percent or lower    Answer Assessment - Initial Assessment Questions  1. COVID-19 DIAGNOSIS: \"Who made your COVID-19 diagnosis?\" \"Was it confirmed by a positive lab test or self-test?\" If not diagnosed by a doctor (or NP/PA), ask \"Are there lots of cases (community spread) where you live?\" Note: See public health department website, if unsure.      Dx on 7/22 with test but sx started 7/21     3. ONSET: \"When did " "the COVID-19 symptoms start?\"      7/21  4. WORST SYMPTOM: \"What is your worst symptom?\" (e.g., cough, fever, shortness of breath, muscle aches)    \"just dizzy and not being able to lay down\"  5. COUGH: \"Do you have a cough?\" If Yes, ask: \"How bad is the cough?\"      Has cough, has asthma, using cough perles but hasn't used since last night. Does have albuterol inhaler at home and hasn't used today as cough better  6. FEVER: \"Do you have a fever?\" If Yes, ask: \"What is your temperature, how was it measured, and when did it start?\"     Temp now 98.6, did felt feverish but tylenol and ibuprofen, did have temp yesterday  7. RESPIRATORY STATUS: \"Describe your breathing?\" (e.g., shortness of breath, wheezing, unable to speak)     Keeping sats above 90 if she sits up but as soon as she lays down dips into 80's , between 85-92 when laying down, now sitting up and 96. It has been this low since the two days ago which was when she went to , stopped in  at [place] yesterday and she was told to go to [facility] and she tried but the  parking was full, she left   8. BETTER-SAME-WORSE: \"Are you getting better, staying the same or getting worse compared to yesterday?\"  If getting worse, ask, \"In what way?\"     Since yesterday she is worse with her O2 readings but fever if better and her body aches are better,  She does feel dizzy and weak today but her other sx are better, she is at home with her wife but her wife is sick as well  9. HIGH RISK DISEASE: \"Do you have any chronic medical problems?\" (e.g., asthma, heart or lung disease, weak immune system, obesity, etc.)   asthma, obesity  10. VACCINE: \"Have you had the COVID-19 vaccine?\" If Yes, ask: \"Which one, how many shots, when did you get it?\"       yes  11. BOOSTER: \"Have you received your COVID-19 booster?\" If Yes, ask: \"Which one and when did you get it?\"      yes  12. PREGNANCY: \"Is there any chance you are pregnant?\" \"When was your last menstrual " "period?\"       no  13. OTHER SYMPTOMS: \"Do you have any other symptoms?\"  (e.g., chills, fatigue, headache, loss of smell or taste, muscle pain, sore throat)      Dizzy , walking and being up makes her sats better/ She is dizzy when sitting down  14. O2 SATURATION MONITOR:  \"Do you use an oxygen saturation monitor (pulse oximeter) at home?\" If Yes, ask \"What is your reading (oxygen level) today?\" \"What is your usual oxygen saturation reading?\" (e.g., 95%)    96 now. Bouncing between 85-92    Protocols used: CORONAVIRUS (COVID-19) DIAGNOSED OR [id number] 1.18.2022      "

## 2022-07-25 NOTE — TELEPHONE ENCOUNTER
Pt did read the [employer] message after it was sent to her that she is to go to ED per Dr Fletcher    Therefore This encounter will be closed    Aaliyah Mckeon RN

## 2022-07-26 ENCOUNTER — TELEPHONE (OUTPATIENT)
Dept: SLEEP MEDICINE | Facility: CLINIC | Age: 47
End: 2022-07-26

## 2022-07-26 ENCOUNTER — PATIENT OUTREACH (OUTPATIENT)
Dept: FAMILY MEDICINE | Facility: CLINIC | Age: 47
End: 2022-07-26

## 2022-07-26 NOTE — ED PROVIDER NOTES
ED Provider Note  [facility]      History   No chief complaint on file.    The history is provided by the patient and medical records.     Leandra Chris is a 47 year old female who is COVID-positive, diagnosed 4 days ago on Friday, July 22.  She presented to the ED today because she is having difficulty wearing her home CPAP due to nasal congestion.  She checked her oxygen at night when she woke up from sleep and found her sats in the 80s. She wears a nasal pillow type of CPAP device and is finding the congestion making it difficult for her to function so she has been sleeping without it.  During the day while awake and with walking she is not having any low O2 sats. She feels she feels rundown and tired but is not coughing significantly, does not have a fever today minimal sore throat.  No new calf pain or ankle swelling     This part of the medical record was transcribed by Catalina Gardner, Medical Scribe, from a dictation done by Janie Parrish MD.     Past Medical History  Past Medical History:   Diagnosis Date     Conductive hearing loss 01/30/2020    Not sure which kind of hearing loss     Depressive disorder      Hoarseness 12/15/2019    Not all the time     LSIL (low grade squamous intraepithelial lesion) on Pap smear 2008    colp neg     Migraines      Mild major depression (H) 07/31/2018     Obstructive sleep apnea 01/2011    moderate     PCOS (polycystic ovarian syndrome)      Pneumonia      Sensorineural hearing loss 01/30/2020    Not sure which kind of hearing loss     Past Surgical History:   Procedure Laterality Date     NO HISTORY OF SURGERY       ALPRAZolam (XANAX) 0.5 MG tablet  buPROPion (WELLBUTRIN XL) 150 MG 24 hr tablet  oxymetazoline (AFRIN) 0.05 % nasal spray  Acetaminophen (TYLENOL PO)  ALPRAZolam (XANAX) 0.5 MG tablet  benzonatate (TESSALON) 200 MG capsule  buPROPion (WELLBUTRIN XL) 150 MG 24 hr tablet  butalbital-aspirin-caffeine (FIORINAL) -40 MG  capsule  meloxicam (MOBIC) 7.5 MG tablet  molnupiravir (LAGEVRIO) 200 MG capsule  ondansetron (ZOFRAN ODT) 4 MG ODT tab  vortioxetine (TRINTELLIX) 20 MG tablet  zolpidem (AMBIEN) 10 MG tablet      Allergies   Allergen Reactions     Penicillin [Penicillins]      Trazodone      Other reaction(s): Headache  Pt reports after taking trazodone she had a migraine requiring IM medication     Shellfish-Derived Products Rash     Family History  Family History   Problem Relation Age of Onset     Prostate Cancer Father      Snoring Father      Asthma Mother      Chronic Obstructive Pulmonary Disease Mother      Depression Mother      Migraines Sister      Depression Sister      Anxiety Disorder Sister      Substance Abuse Brother      Depression Brother      Hypertension Maternal Grandmother      Cerebrovascular Disease Maternal Grandmother         Several mini strokes     Dementia Maternal Grandmother      Cerebrovascular Disease Other         Massive stroke     Other Cancer Cousin         Brain cancer     Other Cancer Paternal Grandmother         Brain cancer     Depression Niece      Anxiety Disorder Niece      C.A.D. No family hx of      Diabetes No family hx of      Breast Cancer No family hx of      Cancer - colorectal No family hx of      Social History   Social History     Tobacco Use     Smoking status: Former Smoker     Packs/day: 0.50     Years: 13.00     Pack years: 6.50     Types: Cigarettes, Clove cigarettes or kreteks, Cigarettes, Clove cigarettes or kreteks     Start date: 1/1/1999     Quit date: 2/1/2012     Years since quitting: 10.4     Smokeless tobacco: Never Used   Vaping Use     Vaping Use: Never used   Substance Use Topics     Alcohol use: Yes     Comment: rarely     Drug use: No      Past medical history, past surgical history, medications, allergies, family history, and social history were reviewed with the patient. No additional pertinent items.       Review of Systems   HENT: Positive for congestion.  "   Respiratory: Positive for shortness of breath (d/t inability to wear CPAP).      A complete review of systems was performed with pertinent positives and negatives noted in the HPI, and all other systems negative.    Physical Exam   BP: 129/82  Pulse: 73  Temp: 97.5  F (36.4  C)  Resp: 18  Height: 167.6 cm (5' 6\")  Weight: 120.2 kg (265 lb)  SpO2: 96 %     Physical Exam  Gen:A&Ox3, no acute distress, tired  HEENT:PERRL, no facial tenderness or wounds, head atraumatic, oropharynx without exudates or significant swelling, mucous membranes moist, TMs clear bilaterally  Neck:no bony tenderness or step offs, no JVD, trachea midline  Back: no CVA tenderness, no midline bony tenderness  CV:RRR without murmurs  PULM:Clear to auscultation bilaterally, RR normal and work of breathing not labored  Abd:soft, nontender, nondistended. Bowel sounds present and normal  UE:No traumatic injuries, skin normal  LE:no traumatic injuries, skin normal, no LE edema or calf tenderness  Neuro: gait stable, coordination grossly normal   Skin: no rashes or ecchymoses    ED Course      Procedures       Results for orders placed or performed during the hospital encounter of 07/25/22   XR Chest 2 Views     Status: None    Narrative    EXAM: XR CHEST 2 VIEWS 7/25/2022 5:19 PM    HISTORY: short of breath, low O2 sats at home.    COMPARISON: Radiograph of the chest dated 11/17/2021.    TECHNIQUE: Upright frontal and lateral views of the chest.    FINDINGS: Trachea is midline. Cardiac and mediastinal silhouette is  within normal limits. No focal pulmonary opacities. No pleural  effusions. No pneumothoraces. No acute osseous abnormalities.      Impression    IMPRESSION: No focal pulmonary opacities.    I have personally reviewed the examination and initial interpretation  and I agree with the findings.    ADILIA MICHEL DO         SYSTEM ID:  [id number]   D dimer quantitative     Status: Normal   Result Value Ref Range    D-Dimer Quantitative 0.49 " 0.00 - 0.50 ug/mL FEU    Narrative    This D-dimer assay is intended for use in conjunction with a clinical pretest probability assessment model to exclude pulmonary embolism (PE) and deep venous thrombosis (DVT) in outpatients suspected of PE or DVT. The cut-off value is 0.50 ug/mL FEU.   Comprehensive metabolic panel     Status: Normal   Result Value Ref Range    Sodium 140 136 - 145 mmol/L    Potassium 4.4 3.4 - 5.3 mmol/L    Creatinine 0.65 0.51 - 0.95 mg/dL    Urea Nitrogen 12.0 6.0 - 20.0 mg/dL    Chloride 105 98 - 107 mmol/L    Carbon Dioxide (CO2) 26 22 - 29 mmol/L    Anion Gap 9 7 - 15 mmol/L    Glucose 91 70 - 99 mg/dL    Calcium 8.8 8.6 - 10.0 mg/dL    Protein Total 7.2 6.4 - 8.3 g/dL    Albumin 4.0 3.5 - 5.2 g/dL    Bilirubin Total 0.2 <=1.2 mg/dL    Alkaline Phosphatase 95 35 - 104 U/L    AST 27 10 - 35 U/L    ALT 20 10 - 35 U/L    GFR Estimate >90 >60 mL/min/1.73m2   HCG qualitative Blood     Status: Normal   Result Value Ref Range    hCG Serum Qualitative Negative Negative   CBC with platelets and differential     Status: None   Result Value Ref Range    WBC Count 5.0 4.0 - 11.0 10e3/uL    RBC Count 4.70 3.80 - 5.20 10e6/uL    Hemoglobin 13.9 11.7 - 15.7 g/dL    Hematocrit 43.7 35.0 - 47.0 %    MCV 93 78 - 100 fL    MCH 29.6 26.5 - 33.0 pg    MCHC 31.8 31.5 - 36.5 g/dL    RDW 13.0 10.0 - 15.0 %    Platelet Count 255 150 - 450 10e3/uL    % Neutrophils 42 %    % Lymphocytes 40 %    % Monocytes 13 %    % Eosinophils 4 %    % Basophils 1 %    % Immature Granulocytes 0 %    NRBCs per 100 WBC 0 <1 /100    Absolute Neutrophils 2.1 1.6 - 8.3 10e3/uL    Absolute Lymphocytes 2.0 0.8 - 5.3 10e3/uL    Absolute Monocytes 0.7 0.0 - 1.3 10e3/uL    Absolute Eosinophils 0.2 0.0 - 0.7 10e3/uL    Absolute Basophils 0.0 0.0 - 0.2 10e3/uL    Absolute Immature Granulocytes 0.0 <=0.4 10e3/uL    Absolute NRBCs 0.0 10e3/uL   EKG 12-lead, tracing only     Status: None (Preliminary result)   Result Value Ref Range    Systolic  Blood Pressure  mmHg    Diastolic Blood Pressure  mmHg    Ventricular Rate 62 BPM    Atrial Rate 62 BPM    WY Interval 132 ms    QRS Duration 86 ms     ms    QTc 410 ms    P Axis 24 degrees    R AXIS -1 degrees    T Axis 9 degrees    Interpretation ECG Sinus rhythm  Normal ECG      CBC with platelets differential     Status: None    Narrative    The following orders were created for panel order CBC with platelets differential.  Procedure                               Abnormality         Status                     ---------                               -----------         ------                     CBC with platelets and d...[[id number]]                      Final result                 Please view results for these tests on the individual orders.     Medications - No data to display     Assessments & Plan (with Medical Decision Making)   47-year-old female presenting with history of sleep apnea with active COVID infection making difficulty with her CPAP. She did wake in the night and had a spot O2 sat in the 80s likely secondary to not being able to use her CPAP.  On arrival here she is afebrile, hemodynamically stable O2 sat was 96% on room air she tolerated trial and walking with O2 sats 99 to 100%.  We also did a trial of laying down and in the horizontal position she still has sats of 98% while awake.  I discussed with RT to see if there are any options for getting her new fullface mask for her CPAP device and they are not able to provide outpatient respiratory supplies. At this time I will plan for her to do a regimen of nasal decongestion to make it more likely that she will be able to wear her CPAP. We will plan to do oral decongestion with NyQuil 30 minutes prior to bed, application of topical nasal saline to clear the nose and then followed by topical Afrin.  I will have her return if still struggling to wear her CPAP device.    This part of the medical record was transcribed by Catalina Gardner  Medical Scribe, from a dictation done by Janie Parrish MD.     I have reviewed the nursing notes. I have reviewed the findings, diagnosis, plan and need for follow up with the patient.    Discharge Medication List as of 7/25/2022  7:00 PM      START taking these medications    Details   oxymetazoline (AFRIN) 0.05 % nasal spray Spray 2 sprays into both nostrils nightly as needed for congestion, Disp-37 mL, R-0, Local Print             Final diagnoses:   Sleep apnea, unspecified type   Infection due to 2019 novel coronavirus       --  Janie Parrish MD  [facility] EMERGENCY DEPARTMENT  7/25/2022     Janie Parrish MD  07/26/22 [id number]

## 2022-07-26 NOTE — TELEPHONE ENCOUNTER
In ED 7/25 with active covid. Low sats determined to likely be from her not wearing CPAP during night.     From MD notes:    At this time I will plan for her to do a regimen of nasal decongestion to make it more likely that she will be able to wear her CPAP. We will plan to do oral decongestion with NyQuil 30 minutes prior to bed, application of topical nasal saline to clear the nose and then followed by topical Afrin.  I will have her return if still struggling to wear her CPAP device    Triage RN team,    Please call pt for E/D visit Hospital discharge  follow up (pulmonary has already reached out to her to get different CPAP mask.    Aaliyah Mckeon, RN, BSN  [facility]

## 2022-07-26 NOTE — TELEPHONE ENCOUNTER
"  ED for acute condition Discharge Protocol    \"Hi, my name is Hetal Alvarez RN, a registered nurse, and I am calling from [facility].  I am calling to follow up and see how things are going for you after your recent emergency visit.\"    Tell me how you are doing now that you are home?\" Patient says she is still not feeling well, but will be getting new CPAP soon.      Discharge Instructions    \"Let's review your discharge instructions.  What is/are the follow-up recommendations?  Pt. Response: follow-up as needed or back to ER if worse    \"Has an appointment with your primary care provider been scheduled?\"  No (not needed)    Medications    \"Tell me what changed about your medicines when you discharged?\"    Afrin Spray    \"What questions do you have about your medications?\"   None        Call Summary    \"What questions or concerns do you have about your recent visit and your follow-up care?\"     none    \"If you have questions or things don't continue to improve, we encourage you contact us through the main clinic number (give number).  Even if the clinic is not open, triage nurses are available 24/7 to help you.     We would like you to know that our clinic has extended hours (provide information).  We also have urgent care (provide details on closest location and hours/contact info)\"    \"Thank you for your time and take care!\"        ODALYS Byrne RN  [facility]          "

## 2022-07-26 NOTE — TELEPHONE ENCOUNTER
CALLED PT TO LET HER KNOW I WILL NEED TO FIGURE OUT WHAT SIZE MASK TO SHIP HER DUE TO HER NEVER GETTING A FFM OR EVEN NASAL MASK FROM [facility]. TOLD HER I WILL EMAIL THE SIZING GUIDES FOR THE F20 AND F30I AND SHE CAN LET ME KNOW WHAT SIZE AND WHICH MASK SHE WOULD LIKE. TOLD HER I WILL SEND THE EMAIL CONSENT ALSO SINCE WE DON'T HAVE ONE SIGNED FOR HER. TOLD HER WE CAN DO THE 30 DAY EXCHG SINCE SHE JUST GOT IT IN JUNE. PT AGREED TO THIS PLAN.

## 2022-07-29 NOTE — TELEPHONE ENCOUNTER
I called patient due to some low readings from O2 monitor overnight.    She confirmed these readings were with her CPAP.     Has not received the new mask for CPAP yet.    Pt not having any troubles breathing now, O2 sats have remained normal per patient, above 93, since she has been awake this AM.    Still concerning about the nightime readings with CPAP. She will be contacting sleep med clinic about this.    I advised she go back to ER if consistent readings below 92% at rest.     Hetal Alvarez, NELAN RN  [facility]

## 2022-09-27 ENCOUNTER — VIRTUAL VISIT (OUTPATIENT)
Dept: FAMILY MEDICINE | Facility: CLINIC | Age: 47
End: 2022-09-27
Payer: COMMERCIAL

## 2022-09-27 DIAGNOSIS — M79.671 PAIN IN BOTH FEET: Primary | ICD-10-CM

## 2022-09-27 DIAGNOSIS — M79.662 PAIN IN BOTH LOWER LEGS: ICD-10-CM

## 2022-09-27 DIAGNOSIS — M25.562 LEFT KNEE PAIN, UNSPECIFIED CHRONICITY: ICD-10-CM

## 2022-09-27 DIAGNOSIS — M79.672 PAIN IN BOTH FEET: Primary | ICD-10-CM

## 2022-09-27 DIAGNOSIS — M79.661 PAIN IN BOTH LOWER LEGS: ICD-10-CM

## 2022-09-27 PROCEDURE — 99214 OFFICE O/P EST MOD 30 MIN: CPT | Mod: GT | Performed by: NURSE PRACTITIONER

## 2022-09-27 NOTE — PROGRESS NOTES
Leandra is a 47 year old who is being evaluated via a billable video visit.      How would you like to obtain your AVS? MyChart  If the video visit is dropped, the invitation should be resent by: Send to e-mail at: [email or web address]  Will anyone else be joining your video visit? No        Assessment & Plan     (M79.671,  M79.672) Pain in both feet  (primary encounter diagnosis)  Comment:   Plan: Orthotics and Prosthetics DME Orthotic; Foot         Orthotics; Bilateral, Physical Therapy Referral        Will refer for orthotics and PT.  She will message me if symptoms are not improving.     (M79.661,  M79.662) Pain in both lower legs  Comment:   Plan: Orthotics and Prosthetics DME Orthotic; Foot         Orthotics; Bilateral, Physical Therapy Referral        Differential includes mechanical dysfunction, vascular, stenosis  See above.    (M25.562) Left knee pain, unspecified chronicity  Comment:   Plan: Orthotics and Prosthetics DME Orthotic; Foot         Orthotics; Bilateral, Physical Therapy Referral                32 minutes spent on the date of the encounter doing chart review, patient visit and documentation            No follow-ups on file.    Nelli Nye NP  [facility]   Leandra is a 47 year old, presenting for the following health issues:  Leg Pain (Bilateral)      History of Present Illness       Reason for visit:  Bone and muscle pain  Symptom onset:  More than a month  Symptoms include:  Pain in feet, calves, knees that affect daily life functions  Symptom intensity:  Moderate  Symptom progression:  Staying the same  Had these symptoms before:  No  What makes it worse:  Standing  What makes it better:  Sitting    She eats 2-3 servings of fruits and vegetables daily.She consumes 0 sweetened beverage(s) daily.She exercises with enough effort to increase her heart rate 20 to 29 minutes per day.  She exercises with enough effort to increase her heart rate 4 days  per week.   She is taking medications regularly.       She sprained her right ankle last spring, but even prior to that she   Pain is only with weight bearing, worse with standing still.  Pain is in both feet, pain in bilateral calves, left knee.  She denies any swelling, color changes, temperature changes,   She denies any back pain, paresthesias.  She has a fallen arch on one of her feet.  She does take Meloxicam as needed, it can help.  Occasionally takes Tylenol at night if they are still aching when she goes to bed.        Review of Systems         Objective           Vitals:  No vitals were obtained today due to virtual visit.    Physical Exam   GENERAL: Healthy, alert and no distress  EYES: Eyes grossly normal to inspection.  No discharge or erythema, or obvious scleral/conjunctival abnormalities.  RESP: No audible wheeze, cough, or visible cyanosis.  No visible retractions or increased work of breathing.    SKIN: Visible skin clear. No significant rash, abnormal pigmentation or lesions.  NEURO: Cranial nerves grossly intact.  Mentation and speech appropriate for age.  PSYCH: Mentation appears normal, affect normal/bright, judgement and insight intact, normal speech and appearance well-groomed.                Video-Visit Details    Video Start Time: 5:25 PM    Type of service:  Video Visit    Video End Time:5:47 PM    Originating Location (pt. Location): Home    Distant Location (provider location):  [facility]     Platform used for Video Visit: Zopim

## 2022-10-25 ENCOUNTER — E-VISIT (OUTPATIENT)
Dept: FAMILY MEDICINE | Facility: CLINIC | Age: 47
End: 2022-10-25
Payer: COMMERCIAL

## 2022-10-25 DIAGNOSIS — R06.02 SHORTNESS OF BREATH: Primary | ICD-10-CM

## 2022-10-25 PROCEDURE — 99207 PR NON-BILLABLE SERV PER CHARTING: CPT | Performed by: PHYSICIAN ASSISTANT

## 2022-10-26 ENCOUNTER — HOSPITAL ENCOUNTER (OUTPATIENT)
Dept: GENERAL RADIOLOGY | Facility: HOSPITAL | Age: 47
Discharge: HOME | End: 2022-10-26
Attending: PHYSICIAN ASSISTANT | Admitting: PHYSICIAN ASSISTANT
Payer: COMMERCIAL

## 2022-10-26 ENCOUNTER — OFFICE VISIT (OUTPATIENT)
Dept: FAMILY MEDICINE | Facility: CLINIC | Age: 47
End: 2022-10-26
Payer: COMMERCIAL

## 2022-10-26 ENCOUNTER — VIRTUAL VISIT (OUTPATIENT)
Dept: URGENT CARE | Facility: CLINIC | Age: 47
End: 2022-10-26
Payer: COMMERCIAL

## 2022-10-26 VITALS
TEMPERATURE: 97.6 F | DIASTOLIC BLOOD PRESSURE: 80 MMHG | OXYGEN SATURATION: 96 % | HEART RATE: 99 BPM | SYSTOLIC BLOOD PRESSURE: 122 MMHG | RESPIRATION RATE: 16 BRPM

## 2022-10-26 DIAGNOSIS — R06.09 DYSPNEA ON EXERTION: ICD-10-CM

## 2022-10-26 DIAGNOSIS — R79.81 LOW OXYGEN SATURATION: ICD-10-CM

## 2022-10-26 DIAGNOSIS — R79.81 LOW OXYGEN SATURATION: Primary | ICD-10-CM

## 2022-10-26 DIAGNOSIS — R05.9 COUGH, UNSPECIFIED TYPE: Primary | ICD-10-CM

## 2022-10-26 PROCEDURE — 99213 OFFICE O/P EST LOW 20 MIN: CPT | Mod: GT

## 2022-10-26 PROCEDURE — 71046 X-RAY EXAM CHEST 2 VIEWS: CPT | Mod: FY

## 2022-10-26 PROCEDURE — 99214 OFFICE O/P EST MOD 30 MIN: CPT | Performed by: PHYSICIAN ASSISTANT

## 2022-10-26 RX ORDER — PREDNISONE 20 MG/1
40 TABLET ORAL DAILY
Qty: 10 TABLET | Refills: 0 | Status: SHIPPED | OUTPATIENT
Start: 2022-10-26 | End: 2022-10-31

## 2022-10-26 ASSESSMENT — ENCOUNTER SYMPTOMS
FEVER: 0
WHEEZING: 1
SHORTNESS OF BREATH: 1
COUGH: 1

## 2022-10-26 NOTE — PATIENT INSTRUCTIONS
Dear Leandra Chris,    We are sorry you are not feeling well. Based on the responses you provided, you may be experiencing a serious health condition that needs immediate in-person attention. It is recommended that you be seen in the emergency room in order to better evaluate your symptoms. Please click here to find the nearest Emergency Room.     Yariel Mooney PA-C

## 2022-10-26 NOTE — PROGRESS NOTES
Leandra Chris is being evaluated via a billable video visit.      Assessment & Plan:     Given patient's reported low O2 sat on her home pulse oximeter and hx of pneumonia/reactive airways, advised pt be evaluated in person to have lungs listened to, vitals checked, and consideration of CXR or other testing. She will go to [facility] for further evaluation. Through limited video evaluation, she is not in acute distress at this time.     See patient instructions below.  At the end of the encounter, I discussed results, diagnosis, medications. Discussed red flags for being seen in person at clinic/ER, as well as indications for follow up if no improvement. Patient understood and agreed to plan.       ICD-10-CM    1. Cough, unspecified type  R05.9       2. Dyspnea on exertion  R06.09             No follow-ups on file.    Video-Visit Details    Type of service:  Video Visit    Video Start Time: 1:28pm  Video End Time: 1:36pm    Originating Location (pt. Location): Home    Distant Location (provider location):  [facility]     Mode of Communication:  Video Conference via EDSON Sauceda, [employer]  [place] [facility]    Subjective:   Leandra Chris is a 47 year old female who is contacted via telephone thru scheduled urgent care virtual visit to discuss:   Chief Complaint   Patient presents with     URI     Fatigue, nasal congestion, fever & chills, and cough onset 4 days ago. She also had nausea, diarrhea, and abdominal cramping on days 1 and 2; those symptoms have resolved. She notes that her O2 sat has been 86-88% when lying down, so she has been trying to remain upright and her O2 sat is around 93% when upright. She has been short of breath when exerting herself. Patient reports no headache, chest pain, vomiting, rash, or any other symptoms. She has taken three at home COVID tests which have been negative. She has been hospitalized with pneumonia in the past,  symptoms feel somewhat similar.    She has an albuterol inhaler she has been using which does help somewhat. Notes that she gets wheezing when sick but does not have a diagnosis of asthma.    Past Medical History:   Diagnosis Date     Conductive hearing loss 01/30/2020    Not sure which kind of hearing loss     Depressive disorder      Hoarseness 12/15/2019    Not all the time     LSIL (low grade squamous intraepithelial lesion) on Pap smear 2008    colp neg     Migraines      Mild major depression (H) 07/31/2018     Obstructive sleep apnea 01/2011    moderate     PCOS (polycystic ovarian syndrome)      Pneumonia      Sensorineural hearing loss 01/30/2020    Not sure which kind of hearing loss       Objective:   Gen:  NAD  Pulm: non-labored work of breathing, speaking in complete sentences, no audible wheezing    No results found for any visits on 10/26/22.    There are no Patient Instructions on file for this visit.

## 2022-10-26 NOTE — PROGRESS NOTES
Patient presents with:  Cough: Vaccine on Friday; Saturday feeling nausea and developed fever. Patient has developed cold, cough. Patient took oxygen at home it was at 86-88% this morning.      Clinical Decision Making:  Patient experiencing low oxygen saturation at home.  Currently patient is vitally stable and having clear lung sounds.  Chest x-ray is negative for any signs of pneumonia today.  I suspect that she is becoming hypoxic due to apneic episodes in the middle of the night.  We discussed other strategies to decrease nasal congestion including Afrin, but she is try these when she previously had COVID in the summer and it did not work for her.  I did offer prednisone to help with shortness of breath and patient states that she has previously tolerated it very well.      ICD-10-CM    1. Low oxygen saturation  R79.81 XR Chest 2 Views     predniSONE (DELTASONE) 20 MG tablet          Patient Instructions   1. Begin taking Prednisone according to bottle instructions.   2. May use Albuterol as needed.   3. During day time you may use over the counter cough/cold medicines.   4. Follow up if you develop any new or worsening symptoms.       HPI:  Leandra Chris is a 47 year old female who presents today complaining of patient experiencing nausea and fever following influenza vaccine 4 days ago.  Patient developed cough and cold symptoms over the weekend.  At home patient's oxygen was 86 to 88% when she was laying down and with improved to 93% when she was up and about.  She does have past medical history of pneumonia.  She completed an online virtual urgent care visit and they directed her to the urgent care for further evaluation.  Patient has done multiple at home COVID test all of which have been negative.  Patient also has a past medical history of sleep apnea and due to her nasal congestion she has been unable to wear her CPAP machine at night.  These low oxygen saturations levels and shortness of breath  episodes have happened in the middle of the night when she wakes.    History obtained from the patient.    Problem List:  2021-08: SERGE (obstructive sleep apnea)  2021-08: TMJ disease  2019-11: Seasonal affective disorder (H)  2019-10: Tobacco dependence in remission  2019-10: Specific phobia  2019-10: PTSD (post-traumatic stress disorder)  2019-09: Moderate major depression (H)  2018-07: Mild major depression (H)  2017-04: BMI of 40.0-44.9, adult (H)  2011-10: Migraine headache  2011-10: Tobacco abuse  2011-10: Obesity  2010-10: CARDIOVASCULAR SCREENING; LDL GOAL LESS THAN 160  2010-07: Insomnia  2009-04: Anxiety  2009-04: Vitamin D deficiency  2009-03: Overdose, drug  2009-02: PCOS (polycystic ovarian syndrome)      Past Medical History:   Diagnosis Date     Conductive hearing loss 01/30/2020    Not sure which kind of hearing loss     Depressive disorder      Hoarseness 12/15/2019    Not all the time     LSIL (low grade squamous intraepithelial lesion) on Pap smear 2008    colp neg     Migraines      Mild major depression (H) 07/31/2018     Obstructive sleep apnea 01/2011    moderate     PCOS (polycystic ovarian syndrome)      Pneumonia      Sensorineural hearing loss 01/30/2020    Not sure which kind of hearing loss       Social History     Tobacco Use     Smoking status: Former     Packs/day: 0.50     Years: 13.00     Pack years: 6.50     Types: Cigarettes, Clove cigarettes or kreteks     Start date: 1/1/1999     Quit date: 2/1/2012     Years since quitting: 10.7     Smokeless tobacco: Never   Substance Use Topics     Alcohol use: Yes     Comment: rarely       Review of Systems   Constitutional: Negative for fever.   Respiratory: Positive for cough, shortness of breath and wheezing.        Vitals:    10/26/22 1600   BP: 122/80   Pulse: 99   Resp: 16   Temp: 97.6  F (36.4  C)   TempSrc: Tympanic   SpO2: 96%       Physical Exam  Vitals and nursing note reviewed.   Constitutional:       General: She is not in acute  distress.     Appearance: She is not toxic-appearing or diaphoretic.   HENT:      Head: Normocephalic and atraumatic.      Right Ear: External ear normal.      Left Ear: External ear normal.   Eyes:      Conjunctiva/sclera: Conjunctivae normal.   Cardiovascular:      Rate and Rhythm: Normal rate and regular rhythm.      Heart sounds: No murmur heard.  Pulmonary:      Effort: Pulmonary effort is normal. No respiratory distress.      Breath sounds: No stridor. No wheezing, rhonchi or rales.   Neurological:      Mental Status: She is alert.   Psychiatric:         Mood and Affect: Mood normal.         Behavior: Behavior normal.         Thought Content: Thought content normal.         Judgment: Judgment normal.         Results:  I have personally ordered and preliminarily reviewed the following xray, I have noted no signs of pneumonia.     Results for orders placed or performed during the hospital encounter of 10/26/22   XR Chest 2 Views     Status: None    Narrative    EXAM: XR CHEST 2 VIEWS  LOCATION: [facility]  DATE/TIME: 10/26/2022 4:16 PM    INDICATION: Cough. Low oxy sat at home. Personal hx of pneumonia.  COMPARISON: Portable AP view of the chest 11/17/2021      Impression    IMPRESSION: Negative chest.         At the end of the encounter, I discussed results, diagnosis, medications. Discussed red flags for immediate return to clinic/ER, as well as indications for follow up if no improvement. Patient understood and agreed to plan. Patient was stable for discharge.

## 2022-10-26 NOTE — PATIENT INSTRUCTIONS
Begin taking Prednisone according to bottle instructions.   May use Albuterol as needed.   During day time you may use over the counter cough/cold medicines.   Follow up if you develop any new or worsening symptoms.

## 2022-10-27 ENCOUNTER — MYC MEDICAL ADVICE (OUTPATIENT)
Dept: FAMILY MEDICINE | Facility: CLINIC | Age: 47
End: 2022-10-27

## 2022-10-27 NOTE — TELEPHONE ENCOUNTER
Responded to patient via [employer]    Pt was seen yesterday in urgent care for low saturations. She attached O2 sat readings from overnight, which are low- to be expected since she is not wearing her cpap    NELA ByrneN RN  [facility]

## 2022-10-31 NOTE — TELEPHONE ENCOUNTER
"Rebecca-    See pts message. The med supply store won't let her come in to get fitted for full face mask for CPAP since she is sick. Seems if her O2 is consistently low she would need to come back in?    \"Hi- after taking the prednisone, my congestion has cleared up a lot. My cough seems better.   My heart rate and oxygen are still pretty low at rest. Heart rate has been running in the 40 s and 50 s and oxygen 84-93.   This week, I feel more dizzy and light headed and have heard more sounds in my lungs. The albuteral has helped some with that.   No more fever. Let me know if you need more info or if there are more steps I should be taking.\"    NELA ByrneN RN  [facility]    "

## 2022-11-01 NOTE — TELEPHONE ENCOUNTER
Writer responded via [employer].    NELA SantoroN, RN-BC  [facility]

## 2022-12-26 DIAGNOSIS — G47.00 INSOMNIA, UNSPECIFIED TYPE: ICD-10-CM

## 2022-12-27 RX ORDER — ZOLPIDEM TARTRATE 10 MG/1
TABLET ORAL
Qty: 30 TABLET | Refills: 5 | Status: SHIPPED | OUTPATIENT
Start: 2022-12-27 | End: 2023-06-28

## 2023-01-01 ENCOUNTER — MYC MEDICAL ADVICE (OUTPATIENT)
Dept: FAMILY MEDICINE | Facility: CLINIC | Age: 48
End: 2023-01-01

## 2023-01-02 ENCOUNTER — NURSE TRIAGE (OUTPATIENT)
Dept: FAMILY MEDICINE | Facility: CLINIC | Age: 48
End: 2023-01-02

## 2023-01-02 NOTE — TELEPHONE ENCOUNTER
See 1/2/23 nurse triage encounter.    Writer responded via [employer].      NELA SantoroN, RN-BC  [facility]

## 2023-01-02 NOTE — TELEPHONE ENCOUNTER
"Per 1/1/23 Marlenyt encounter:  \"My symptoms started Friday with a cold and cough and I tested positive today.   What treatment options do I have?\"    Left message to call back and ask to speak with an available triage nurse.    NELA SantoroN, RN-[facility]    "

## 2023-01-05 NOTE — TELEPHONE ENCOUNTER
Pt is now outside treatment window.  Message read with no response.  Closing encounter.    MICHELLE Martins RN  [facility]

## 2023-01-16 ENCOUNTER — E-VISIT (OUTPATIENT)
Dept: FAMILY MEDICINE | Facility: CLINIC | Age: 48
End: 2023-01-16
Payer: COMMERCIAL

## 2023-01-16 DIAGNOSIS — R50.9 FEVER, UNSPECIFIED FEVER CAUSE: Primary | ICD-10-CM

## 2023-01-16 DIAGNOSIS — J02.9 SORE THROAT: ICD-10-CM

## 2023-01-16 PROCEDURE — 99421 OL DIG E/M SVC 5-10 MIN: CPT | Performed by: NURSE PRACTITIONER

## 2023-04-10 DIAGNOSIS — M25.561 ACUTE PAIN OF RIGHT KNEE: ICD-10-CM

## 2023-04-12 NOTE — TELEPHONE ENCOUNTER
Routing refill request to provider for review/approval because:  Drug not active on patient's medication list    Last Written Prescription Date:  6/20/22  Last Fill Quantity: 30,  # refills: 5   Last office visit: 9/27/22 virtual with Pope[facility]  Future Office Visit:      [place]ia AVALOS RN  [facility]

## 2023-04-16 ENCOUNTER — HEALTH MAINTENANCE LETTER (OUTPATIENT)
Age: 48
End: 2023-04-16

## 2023-04-17 RX ORDER — MELOXICAM 7.5 MG/1
7.5 TABLET ORAL DAILY
Qty: 30 TABLET | Refills: 5 | Status: SHIPPED | OUTPATIENT
Start: 2023-04-17 | End: 2023-07-11

## 2023-05-22 ENCOUNTER — PATIENT OUTREACH (OUTPATIENT)
Dept: CARE COORDINATION | Facility: CLINIC | Age: 48
End: 2023-05-22
Payer: COMMERCIAL

## 2023-05-22 ENCOUNTER — MYC REFILL (OUTPATIENT)
Dept: FAMILY MEDICINE | Facility: CLINIC | Age: 48
End: 2023-05-22
Payer: COMMERCIAL

## 2023-05-22 DIAGNOSIS — F40.243 FLYING PHOBIA: ICD-10-CM

## 2023-05-24 RX ORDER — ALPRAZOLAM 0.5 MG
TABLET ORAL
Qty: 20 TABLET | Refills: 0 | Status: SHIPPED | OUTPATIENT
Start: 2023-05-24

## 2023-05-24 NOTE — TELEPHONE ENCOUNTER
Routing refill request to provider for review/approval because:  --Drug not on the [facility] refill protocol.  --Patient Comment: Going on flight in June        --Last visit:  9/27/22 [facility] for acute care.  --Future Visit: 7/11/23 [facility] for CPE.      --Last Written Prescription:     Disp Refills Start End RANDY   ALPRAZolam (XANAX) 0.5 MG tablet 20 tablet 0 2/23/2022  No   Sig: Take 1-2 tablets 45 minutes prior to flight

## 2023-07-04 ASSESSMENT — ENCOUNTER SYMPTOMS
DIZZINESS: 0
ABDOMINAL PAIN: 0
PARESTHESIAS: 0
NAUSEA: 0
DIARRHEA: 0
EYE PAIN: 0
CONSTIPATION: 0
FREQUENCY: 0
SHORTNESS OF BREATH: 0
WEAKNESS: 1
SORE THROAT: 0
HEADACHES: 0
NERVOUS/ANXIOUS: 0
PALPITATIONS: 0
BREAST MASS: 0
MYALGIAS: 1
COUGH: 0
CHILLS: 0
HEMATOCHEZIA: 0
ARTHRALGIAS: 1
JOINT SWELLING: 0
HEARTBURN: 0
HEMATURIA: 0
DYSURIA: 0
FEVER: 0

## 2023-07-10 ASSESSMENT — PATIENT HEALTH QUESTIONNAIRE - PHQ9
10. IF YOU CHECKED OFF ANY PROBLEMS, HOW DIFFICULT HAVE THESE PROBLEMS MADE IT FOR YOU TO DO YOUR WORK, TAKE CARE OF THINGS AT HOME, OR GET ALONG WITH OTHER PEOPLE: SOMEWHAT DIFFICULT
SUM OF ALL RESPONSES TO PHQ QUESTIONS 1-9: 7
SUM OF ALL RESPONSES TO PHQ QUESTIONS 1-9: 7

## 2023-07-11 ENCOUNTER — OFFICE VISIT (OUTPATIENT)
Dept: FAMILY MEDICINE | Facility: CLINIC | Age: 48
End: 2023-07-11
Payer: COMMERCIAL

## 2023-07-11 VITALS
HEIGHT: 64 IN | TEMPERATURE: 97.4 F | DIASTOLIC BLOOD PRESSURE: 76 MMHG | RESPIRATION RATE: 18 BRPM | BODY MASS INDEX: 46.62 KG/M2 | OXYGEN SATURATION: 97 % | SYSTOLIC BLOOD PRESSURE: 112 MMHG | HEART RATE: 67 BPM | WEIGHT: 273.08 LBS

## 2023-07-11 DIAGNOSIS — Z23 NEED FOR HEPATITIS B VACCINATION: ICD-10-CM

## 2023-07-11 DIAGNOSIS — M79.661 PAIN IN BOTH LOWER LEGS: ICD-10-CM

## 2023-07-11 DIAGNOSIS — Z12.31 VISIT FOR SCREENING MAMMOGRAM: ICD-10-CM

## 2023-07-11 DIAGNOSIS — M79.662 PAIN IN BOTH LOWER LEGS: ICD-10-CM

## 2023-07-11 DIAGNOSIS — F32.1 MODERATE MAJOR DEPRESSION (H): ICD-10-CM

## 2023-07-11 DIAGNOSIS — G47.00 INSOMNIA, UNSPECIFIED TYPE: ICD-10-CM

## 2023-07-11 DIAGNOSIS — E66.01 MORBID OBESITY WITH BMI OF 40.0-44.9, ADULT (H): ICD-10-CM

## 2023-07-11 DIAGNOSIS — M25.561 ACUTE PAIN OF RIGHT KNEE: ICD-10-CM

## 2023-07-11 DIAGNOSIS — Z12.11 SCREEN FOR COLON CANCER: ICD-10-CM

## 2023-07-11 DIAGNOSIS — Z00.00 ROUTINE GENERAL MEDICAL EXAMINATION AT A HEALTH CARE FACILITY: Primary | ICD-10-CM

## 2023-07-11 LAB
ANION GAP SERPL CALCULATED.3IONS-SCNC: 13 MMOL/L (ref 7–15)
BUN SERPL-MCNC: 11.6 MG/DL (ref 6–20)
CALCIUM SERPL-MCNC: 9.4 MG/DL (ref 8.6–10)
CHLORIDE SERPL-SCNC: 103 MMOL/L (ref 98–107)
CHOLEST SERPL-MCNC: 198 MG/DL
CREAT SERPL-MCNC: 0.78 MG/DL (ref 0.51–0.95)
DEPRECATED HCO3 PLAS-SCNC: 24 MMOL/L (ref 22–29)
GFR SERPL CREATININE-BSD FRML MDRD: >90 ML/MIN/1.73M2
GLUCOSE SERPL-MCNC: 100 MG/DL (ref 70–99)
HDLC SERPL-MCNC: 60 MG/DL
LDLC SERPL CALC-MCNC: 109 MG/DL
NONHDLC SERPL-MCNC: 138 MG/DL
POTASSIUM SERPL-SCNC: 4.8 MMOL/L (ref 3.4–5.3)
SODIUM SERPL-SCNC: 140 MMOL/L (ref 136–145)
TRIGL SERPL-MCNC: 145 MG/DL

## 2023-07-11 PROCEDURE — 80048 BASIC METABOLIC PNL TOTAL CA: CPT | Performed by: NURSE PRACTITIONER

## 2023-07-11 PROCEDURE — 90746 HEPB VACCINE 3 DOSE ADULT IM: CPT | Performed by: NURSE PRACTITIONER

## 2023-07-11 PROCEDURE — 36415 COLL VENOUS BLD VENIPUNCTURE: CPT | Performed by: NURSE PRACTITIONER

## 2023-07-11 PROCEDURE — 99396 PREV VISIT EST AGE 40-64: CPT | Mod: 25 | Performed by: NURSE PRACTITIONER

## 2023-07-11 PROCEDURE — 99213 OFFICE O/P EST LOW 20 MIN: CPT | Mod: 25 | Performed by: NURSE PRACTITIONER

## 2023-07-11 PROCEDURE — 90471 IMMUNIZATION ADMIN: CPT | Performed by: NURSE PRACTITIONER

## 2023-07-11 PROCEDURE — 80061 LIPID PANEL: CPT | Performed by: NURSE PRACTITIONER

## 2023-07-11 RX ORDER — ZOLPIDEM TARTRATE 10 MG/1
TABLET ORAL
Qty: 30 TABLET | Refills: 5 | Status: SHIPPED | OUTPATIENT
Start: 2023-07-11 | End: 2024-01-14

## 2023-07-11 RX ORDER — MELOXICAM 7.5 MG/1
7.5 TABLET ORAL DAILY
Qty: 30 TABLET | Refills: 12 | Status: SHIPPED | OUTPATIENT
Start: 2023-07-11 | End: 2024-07-10

## 2023-07-11 ASSESSMENT — ENCOUNTER SYMPTOMS
ABDOMINAL PAIN: 0
SHORTNESS OF BREATH: 0
HEARTBURN: 0
BREAST MASS: 0
NAUSEA: 0
FREQUENCY: 0
NERVOUS/ANXIOUS: 0
WEAKNESS: 1
EYE PAIN: 0
FEVER: 0
DIARRHEA: 0
SORE THROAT: 0
HEMATOCHEZIA: 0
DYSURIA: 0
HEADACHES: 0
COUGH: 0
CONSTIPATION: 0
PARESTHESIAS: 0
PALPITATIONS: 0
ARTHRALGIAS: 1
DIZZINESS: 0
JOINT SWELLING: 0
MYALGIAS: 1
HEMATURIA: 0
CHILLS: 0

## 2023-07-11 ASSESSMENT — PAIN SCALES - GENERAL: PAINLEVEL: NO PAIN (0)

## 2023-07-11 NOTE — PROGRESS NOTES
SUBJECTIVE:   CC: Leandra is an 48 year old who presents for preventive health visit.       2023     2:17 PM   Additional Questions   Roomed by Vannessa     Healthy Habits:     Getting at least 3 servings of Calcium per day:  Yes    Bi-annual eye exam:  NO    Dental care twice a year:  Yes    Sleep apnea or symptoms of sleep apnea:  Daytime drowsiness and Sleep apnea    Diet:  Regular (no restrictions)    Frequency of exercise:  2-3 days/week    Duration of exercise:  30-45 minutes    Taking medications regularly:  Yes    Medication side effects:  None    Additional concerns today:  No    She has bilateral calf pain with standing in place.  She denies any swelling, redness or warmth.  Feels better when she moves.      Migraines are infrequent.  She has been able to identify triggers.  Fiorinal works well when she needs to take it.     Today's PHQ-9 Score:       7/10/2023    12:11 AM   PHQ-9 SCORE   PHQ-9 Total Score MyChart 7 (Mild depression)   PHQ-9 Total Score 7               Social History     Tobacco Use     Smoking status: Former     Packs/day: 0.50     Years: 13.00     Pack years: 6.50     Types: Cigarettes, Clove cigarettes or kreteks     Start date: 1999     Quit date: 2012     Years since quittin.4     Smokeless tobacco: Never   Substance Use Topics     Alcohol use: Yes     Comment: rarely             2023    12:33 PM   Alcohol Use   Prescreen: >3 drinks/day or >7 drinks/week? No     Reviewed orders with patient.  Reviewed health maintenance and updated orders accordingly - Yes      Breast Cancer Screenin/17/2022     7:56 AM   Breast CA Risk Assessment (FHS-7)   Do you have a family history of breast, colon, or ovarian cancer? No / Unknown           Pertinent mammograms are reviewed under the imaging tab.    History of abnormal Pap smear: NO - age 30-65 PAP every 5 years with negative HPV co-testing recommended      Latest Ref Rng & Units 2018     9:42 AM 2018      "9:34 AM 6/30/2015    12:00 AM   PAP / HPV   PAP (Historical)   NIL  NIL    HPV 16 DNA NEG^Negative Negative      HPV 18 DNA NEG^Negative Negative      Other HR HPV NEG^Negative Negative        Reviewed and updated as needed this visit by clinical staff   Tobacco  Allergies  Meds              Reviewed and updated as needed this visit by Provider                     Review of Systems   Constitutional: Negative for chills and fever.   HENT: Negative for congestion, ear pain, hearing loss and sore throat.    Eyes: Negative for pain and visual disturbance.   Respiratory: Negative for cough and shortness of breath.    Cardiovascular: Negative for chest pain, palpitations and peripheral edema.   Gastrointestinal: Negative for abdominal pain, constipation, diarrhea, heartburn, hematochezia and nausea.   Breasts:  Negative for tenderness, breast mass and discharge.   Genitourinary: Positive for urgency. Negative for dysuria, frequency, genital sores, hematuria, pelvic pain, vaginal bleeding and vaginal discharge.   Musculoskeletal: Positive for arthralgias and myalgias. Negative for joint swelling.   Skin: Negative for rash.   Neurological: Positive for weakness. Negative for dizziness, headaches and paresthesias.   Psychiatric/Behavioral: Negative for mood changes. The patient is not nervous/anxious.           OBJECTIVE:   /76 (BP Location: Right arm, Patient Position: Sitting, Cuff Size: Adult Large)   Pulse 67   Temp 97.4  F (36.3  C) (Temporal)   Resp 18   Ht 1.626 m (5' 4\")   Wt 123.9 kg (273 lb 1.3 oz)   LMP 07/10/2023 (Exact Date)   SpO2 97%   Breastfeeding No   BMI 46.87 kg/m    Physical Exam  GENERAL: healthy, alert and no distress  EYES: Eyes grossly normal to inspection, PERRL and conjunctivae and sclerae normal  HENT: ear canals and TM's normal, nose and mouth without ulcers or lesions  NECK: no adenopathy, no asymmetry, masses, or scars and thyroid normal to palpation  RESP: lungs clear to " auscultation - no rales, rhonchi or wheezes  BREAST: normal without masses, tenderness or nipple discharge and no palpable axillary masses or adenopathy  CV: regular rate and rhythm, normal S1 S2, no S3 or S4, no murmur, click or rub, no peripheral edema and peripheral pulses strong  ABDOMEN: soft, nontender, no hepatosplenomegaly, no masses and bowel sounds normal  MS: no gross musculoskeletal defects noted, no edema  SKIN: no suspicious lesions or rashes  NEURO: Normal strength and tone, mentation intact and speech normal  PSYCH: mentation appears normal, affect normal/bright        ASSESSMENT/PLAN:   (Z00.00) Routine general medical examination at a health care facility  (primary encounter diagnosis)  Comment:   Plan: Lipid panel reflex to direct LDL Non-fasting        She will return for pap since her period started today.     (Z12.31) Visit for screening mammogram  Comment:   Plan: MA SCREENING DIGITAL BILAT - Future  (s+30)            (Z12.11) Screen for colon cancer  Comment:   Plan: Colonoscopy Screening  Referral            (M79.661,  M79.662) Pain in both lower legs  Comment:   Plan: Physical Therapy Referral        Possible radiculitis vs stenosis, leg cramps.  Will refer to PT.     (M25.561) Acute pain of right knee  Comment:   Plan: meloxicam (MOBIC) 7.5 MG tablet, Basic         metabolic panel  (Ca, Cl, CO2, Creat, Gluc, K,         Na, BUN)        The current medical regimen is effective;  continue present plan and medications.      (G47.00) Insomnia, unspecified type  Comment: stable  Plan: zolpidem (AMBIEN) 10 MG tablet        The current medical regimen is effective;  continue present plan and medications.     (Z23) Need for hepatitis B vaccination  Comment:   Plan:     (F32.1) Moderate major depression (H)  Comment: stable  Plan: She currently is stable off of medications.     (E66.01,  Z68.41) Morbid obesity with BMI of 40.0-44.9, adult (H)  Comment:   Plan: Continue to working on diet  "and exercise.           COUNSELING:  Reviewed preventive health counseling, as reflected in patient instructions      BMI:   Estimated body mass index is 46.87 kg/m  as calculated from the following:    Height as of this encounter: 1.626 m (5' 4\").    Weight as of this encounter: 123.9 kg (273 lb 1.3 oz).         She reports that she quit smoking about 11 years ago. Her smoking use included cigarettes and clove cigarettes or kreteks. She started smoking about 24 years ago. She has a 6.50 pack-year smoking history. She has never used smokeless tobacco.      Nelli Nye NP  [facility]  Answers for HPI/ROS submitted by the patient on 7/10/2023  If you checked off any problems, how difficult have these problems made it for you to do your work, take care of things at home, or get along with other people?: Somewhat difficult  PHQ9 TOTAL SCORE: 7      "

## 2023-07-11 NOTE — NURSING NOTE
Prior to immunization administration, verified patients identity using patient s name and date of birth. Please see Immunization Activity for additional information.     Screening Questionnaire for Adult Immunization    Are you sick today?   No   Do you have allergies to medications, food, a vaccine component or latex?   No   Have you ever had a serious reaction after receiving a vaccination?   No   Do you have a long-term health problem with heart, lung, kidney, or metabolic disease (e.g., diabetes), asthma, a blood disorder, no spleen, complement component deficiency, a cochlear implant, or a spinal fluid leak?  Are you on long-term aspirin therapy?   No   Do you have cancer, leukemia, HIV/AIDS, or any other immune system problem?   No   Do you have a parent, brother, or sister with an immune system problem?   No   In the past 3 months, have you taken medications that affect  your immune system, such as prednisone, other steroids, or anticancer drugs; drugs for the treatment of rheumatoid arthritis, Crohn s disease, or psoriasis; or have you had radiation treatments?   No   Have you had a seizure, or a brain or other nervous system problem?   No   During the past year, have you received a transfusion of blood or blood    products, or been given immune (gamma) globulin or antiviral drug?   No   For women: Are you pregnant or is there a chance you could become       pregnant during the next month?   No   Have you received any vaccinations in the past 4 weeks?   No     Immunization questionnaire answers were all negative.      Patient instructed to remain in clinic for 15 minutes afterwards, and to report any adverse reactions.     Screening performed by Anastasiya Cabrera on 7/11/2023 at 3:21 PM.

## 2023-07-20 ASSESSMENT — ACTIVITIES OF DAILY LIVING (ADL)
KNEEL ON THE FRONT OF YOUR KNEE: I AM UNABLE TO DO THE ACTIVITY
GO DOWN STAIRS: ACTIVITY IS SOMEWHAT DIFFICULT
SIT WITH YOUR KNEE BENT: ACTIVITY IS NOT DIFFICULT
KNEE_ACTIVITY_OF_DAILY_LIVING_SCORE: 51.43
GO UP STAIRS: ACTIVITY IS FAIRLY DIFFICULT
AS_A_RESULT_OF_YOUR_KNEE_INJURY,_HOW_WOULD_YOU_RATE_YOUR_CURRENT_LEVEL_OF_DAILY_ACTIVITY?: ABNORMAL
PAIN: THE SYMPTOM AFFECTS MY ACTIVITY MODERATELY
HOW_WOULD_YOU_RATE_THE_OVERALL_FUNCTION_OF_YOUR_KNEE_DURING_YOUR_USUAL_DAILY_ACTIVITIES?: NORMAL
WALK: ACTIVITY IS SOMEWHAT DIFFICULT
RISE FROM A CHAIR: ACTIVITY IS SOMEWHAT DIFFICULT
GIVING WAY, BUCKLING OR SHIFTING OF KNEE: I DO NOT HAVE THE SYMPTOM
SWELLING: I DO NOT HAVE THE SYMPTOM
STIFFNESS: THE SYMPTOM AFFECTS MY ACTIVITY MODERATELY
WEAKNESS: THE SYMPTOM AFFECTS MY ACTIVITY SEVERELY
SQUAT: I AM UNABLE TO DO THE ACTIVITY
LIMPING: THE SYMPTOM AFFECTS MY ACTIVITY SLIGHTLY
KNEE_ACTIVITY_OF_DAILY_LIVING_SUM: 36
STAND: ACTIVITY IS FAIRLY DIFFICULT
HOW_WOULD_YOU_RATE_THE_CURRENT_FUNCTION_OF_YOUR_KNEE_DURING_YOUR_USUAL_DAILY_ACTIVITIES_ON_A_SCALE_FROM_0_TO_100_WITH_100_BEING_YOUR_LEVEL_OF_KNEE_FUNCTION_PRIOR_TO_YOUR_INJURY_AND_0_BEING_THE_INABILITY_TO_PERFORM_ANY_OF_YOUR_USUAL_DAILY_ACTIVITIES?: 45
RAW_SCORE: 36

## 2023-07-21 ENCOUNTER — THERAPY VISIT (OUTPATIENT)
Dept: PHYSICAL THERAPY | Facility: REHABILITATION | Age: 48
End: 2023-07-21
Attending: NURSE PRACTITIONER
Payer: COMMERCIAL

## 2023-07-21 DIAGNOSIS — M79.661 PAIN IN BOTH LOWER LEGS: ICD-10-CM

## 2023-07-21 DIAGNOSIS — M79.662 PAIN IN BOTH LOWER LEGS: ICD-10-CM

## 2023-07-21 PROCEDURE — 97161 PT EVAL LOW COMPLEX 20 MIN: CPT | Mod: GP | Performed by: PHYSICAL THERAPIST

## 2023-07-21 PROCEDURE — 97110 THERAPEUTIC EXERCISES: CPT | Mod: GP | Performed by: PHYSICAL THERAPIST

## 2023-07-21 NOTE — PROGRESS NOTES
PHYSICAL THERAPY EVALUATION  Type of Visit: Evaluation    See electronic medical record for Abuse and Falls Screening details.    Subjective       Presenting condition or subjective complaint: I am unable to move like i used to. Standing is especially hard. My calves ache quickly. My left knee is weak and painful.  Date of onset: 07/11/23    Relevant medical history: Arthritis; Depression; Hearing problems; History of fractures; Migraines or headaches; Overweight; Pain at night or rest; Significant weakness; Sleep disorder like apnea   Dates & types of surgery:      Prior diagnostic imaging/testing results:       Prior therapy history for the same diagnosis, illness or injury: No      Patient sprained right ankle (cant remember) badly in 2022, received a brace that did not fit or work well. Feels weakness in left knee after ankle sprain thinks it was from walking differently. Having bilateral calf discomfort with standing prolonged periods. Has difficulty straightening left knee in the morning. Calf discomfort/burning will develop after a couple minutes, gets better with walking. Lower back doesn't hurt.  Concerned about left knee giving out when walking on uneven surfaces. Difficulty with stairs. Used to walk more in neighborhood. Walking hurts knee.     Prior Level of Function  Transfers: Independent  Ambulation: Independent  ADL: Independent  IADL: ind    Living Environment  Social support: With a significant other or spouse   Type of home: House; Multi-level   Stairs to enter the home: Yes       Ramp: No   Stairs inside the home: Yes 18 Is there a railing: No   Help at home: None  Equipment owned:       Employment: Yes Teacher  Hobbies/Interests:      Patient goals for therapy: Move around more confidently without pain       Objective   KNEE EVALUATION  PAIN: burning pain in bilateral calfs with standing, tightness in left knee with trying to straighten.   INTEGUMENTARY (edema, incisions): WNL  POSTURE:  WNL  GAIT:  Weightbearing Status: WBAT  Assistive Device(s): None  Gait Deviations: decreased stance time during left stance phase  BALANCE/PROPRIOCEPTION: WNL  WEIGHTBEARING ALIGNMENT: WNL  NON-WEIGHTBEARING ALIGNMENT: WNL  ROM: AROM WFL  Lumbar flexion and extension WNL non painful  STRENGTH:   Pain: - none + mild ++ moderate +++ severe  Strength Scale: 0-5/5 Left Right   Ankle Plantarflexion 5 5-   Knee Extension 5- 5   Hip Abduction 4+ 4+     FUNCTIONAL TESTS: sit to stand, able to perform with difficulty  Reflexes: absent patellar and achilles bilaterally    Assessment & Plan   CLINICAL IMPRESSIONS  Medical Diagnosis: M79.661, M79.662 (ICD-10-CM) - Pain in both lower legs    Treatment Diagnosis: weakness in right ankle plantarflexion, weakness in bilateral hip abductors, functional lower extremity weakness   Impression/Assessment: Patient is a 48 year old female with left knee weakness with stairs and walking and bilateral calf muscle burning symptoms with standing.  The following significant findings have been identified: Decreased strength, Impaired gait, Impaired muscle performance and Decreased activity tolerance. These impairments interfere with their ability to perform recreational activities and household mobility as compared to previous level of function. Patient will benefit from skilled physical therapy to address impairments and functional limitations in order to achieve their goals.       Clinical Decision Making (Complexity):  Clinical Presentation: Stable/Uncomplicated  Clinical Presentation Rationale: based on medical and personal factors listed in PT evaluation  Clinical Decision Making (Complexity): Low complexity    PLAN OF CARE  Treatment Interventions:  Interventions: Manual Therapy, Neuromuscular Re-education, Therapeutic Activity, Therapeutic Exercise, Self-Care/Home Management    Long Term Goals     PT Goal 1  Goal Identifier: HEP      Frequency of Treatment: every other week  Duration of  Treatment: 12 weeks    Recommended Referrals to Other Professionals:   Education Assessment:        Risks and benefits of evaluation/treatment have been explained.   Patient/Family/caregiver agrees with Plan of Care.     Evaluation Time:           Signing Clinician: Chao Davis PT

## 2023-07-31 ENCOUNTER — OFFICE VISIT (OUTPATIENT)
Dept: FAMILY MEDICINE | Facility: CLINIC | Age: 48
End: 2023-07-31
Payer: COMMERCIAL

## 2023-07-31 VITALS
BODY MASS INDEX: 44.08 KG/M2 | HEIGHT: 66 IN | SYSTOLIC BLOOD PRESSURE: 128 MMHG | HEART RATE: 71 BPM | DIASTOLIC BLOOD PRESSURE: 81 MMHG | RESPIRATION RATE: 15 BRPM | OXYGEN SATURATION: 98 % | TEMPERATURE: 97.2 F

## 2023-07-31 DIAGNOSIS — Z12.4 SCREENING FOR MALIGNANT NEOPLASM OF CERVIX: Primary | ICD-10-CM

## 2023-07-31 PROCEDURE — G0145 SCR C/V CYTO,THINLAYER,RESCR: HCPCS | Performed by: NURSE PRACTITIONER

## 2023-07-31 PROCEDURE — 87624 HPV HI-RISK TYP POOLED RSLT: CPT | Performed by: NURSE PRACTITIONER

## 2023-07-31 PROCEDURE — 99207 PR NO CHARGE LOS: CPT | Performed by: NURSE PRACTITIONER

## 2023-07-31 ASSESSMENT — PAIN SCALES - GENERAL: PAINLEVEL: MILD PAIN (3)

## 2023-07-31 NOTE — PROGRESS NOTES
"  Assessment & Plan     Screening for malignant neoplasm of cervix    - Pap screen with HPV - recommended age 30 - 65 years             BMI:   Estimated body mass index is 44.08 kg/m  as calculated from the following:    Height as of this encounter: 1.676 m (5' 6\").    Weight as of 7/11/23: 123.9 kg (273 lb 1.3 oz).           Nelli Nye NP  [facility]    Ariela Mobley is a 48 year old, presenting for the following health issues:  Follow Up (Pap /)        7/31/2023     9:46 AM   Additional Questions   Roomed by Lelia Paniagua       History of Present Illness       Reason for visit:  Pap    She eats 2-3 servings of fruits and vegetables daily.She consumes 1 sweetened beverage(s) daily.She exercises with enough effort to increase her heart rate 20 to 29 minutes per day.  She exercises with enough effort to increase her heart rate 4 days per week.   She is taking medications regularly.     She had her period when she was here recently for her preventive visit, so is back today for her pap smear.             Review of Systems         Objective    /81 (BP Location: Right arm, Patient Position: Sitting, Cuff Size: Adult Regular)   Pulse 71   Temp 97.2  F (36.2  C) (Temporal)   Resp 15   Ht 1.676 m (5' 6\")   LMP 07/10/2023 (Exact Date)   SpO2 98%   BMI 44.08 kg/m    Body mass index is 44.08 kg/m .  Physical Exam   GENERAL: healthy, alert and no distress   (female): normal female external genitalia, normal urethral meatus, vaginal mucosa, normal cervix/adnexa/uterus without masses or discharge  PSYCH: mentation appears normal, affect normal/bright                      "

## 2023-08-02 LAB
BKR LAB AP GYN ADEQUACY: NORMAL
BKR LAB AP GYN INTERPRETATION: NORMAL
BKR LAB AP HPV REFLEX: NORMAL
BKR LAB AP LMP: NORMAL
BKR LAB AP PREVIOUS ABNORMAL: NORMAL
PATH REPORT.COMMENTS IMP SPEC: NORMAL
PATH REPORT.COMMENTS IMP SPEC: NORMAL
PATH REPORT.RELEVANT HX SPEC: NORMAL

## 2023-08-04 LAB
HUMAN PAPILLOMA VIRUS 16 DNA: NEGATIVE
HUMAN PAPILLOMA VIRUS 18 DNA: NEGATIVE
HUMAN PAPILLOMA VIRUS FINAL DIAGNOSIS: NORMAL
HUMAN PAPILLOMA VIRUS OTHER HR: NEGATIVE

## 2023-08-18 ENCOUNTER — THERAPY VISIT (OUTPATIENT)
Dept: PHYSICAL THERAPY | Facility: REHABILITATION | Age: 48
End: 2023-08-18
Payer: COMMERCIAL

## 2023-08-18 DIAGNOSIS — M79.661 PAIN IN BOTH LOWER LEGS: Primary | ICD-10-CM

## 2023-08-18 DIAGNOSIS — M79.662 PAIN IN BOTH LOWER LEGS: Primary | ICD-10-CM

## 2023-08-18 PROCEDURE — 97110 THERAPEUTIC EXERCISES: CPT | Mod: GP | Performed by: PHYSICAL THERAPIST

## 2023-10-06 ENCOUNTER — TELEPHONE (OUTPATIENT)
Dept: GASTROENTEROLOGY | Facility: CLINIC | Age: 48
End: 2023-10-06
Payer: COMMERCIAL

## 2023-10-06 NOTE — TELEPHONE ENCOUNTER
"Endoscopy Scheduling Screen    Have you had a positive Covid test in the last 14 days?  No    Are you active on MyChart?   Yes    What insurance is in the chart?  Other:  [employer]    Ordering/Referring Provider: TERRELL VIRK   (If ordering provider performs procedure, schedule with ordering provider unless otherwise instructed. )    BMI: Estimated body mass index is 44.08 kg/m  as calculated from the following:    Height as of 7/31/23: 1.676 m (5' 6\").    Weight as of 7/11/23: 123.9 kg (273 lb 1.3 oz).     Sedation Ordered  moderate sedation.   If patient BMI > 50 do not schedule in [facility].    If patient BMI > 45 do not schedule at [facility].    Are you taking methadone or Suboxone?  No    Are you taking any prescription medications for pain 3 or more times per week?   No    Do you have a history of malignant hyperthermia or adverse reaction to anesthesia?  No    (Females) Are you currently pregnant?   No     Have you been diagnosed or told you have pulmonary hypertension?   No    Do you have an LVAD?  No    Have you been told you have moderate to severe sleep apnea?  Yes (RN Review required for scheduling unless scheduling in Hospital.)    Have you been told you have COPD, asthma, or any other lung disease?  Yes     What breathing problems do you have?  Asthma     Do you use home oxygen?  No    Have your breathing problems required an ED visit or hospitalization in the last year?  No    Do you have any heart conditions?  No     Have you ever had an organ transplant?   No    Have you ever had or are you awaiting a heart or lung transplant?   No    Have you had a stroke or transient ischemic attack (TIA aka \"mini stroke\" in the last 6 months?   No    Have you been diagnosed with or been told you have cirrhosis of the liver?   No    Are you currently on dialysis?   No    Do you need assistance transferring?   No    BMI: Estimated body mass index is 44.08 kg/m  as calculated from the following:    Height as of " "7/31/23: 1.676 m (5' 6\").    Weight as of 7/11/23: 123.9 kg (273 lb 1.3 oz).     Is patients BMI > 40 and scheduling location [facility]?  Yes (If MAC sedation is ordered, schedule PAC eval)    Do you take an injectable medication for weight loss or diabetes (excluding insulin)?  No    Do you take the medication Naltrexone?  No    Do you take blood thinners?  No       Prep   Are you currently on dialysis or do you have chronic kidney disease?  No    Do you have a diagnosis of diabetes?  No    Do you have a diagnosis of cystic fibrosis (CF)?  No    On a regular basis do you go 3 -5 days between bowel movements?  No    BMI > 40?  Yes (Extended Prep)    Preferred Pharmacy:    [facility] #[id number] [address] AT [facility] [address] [address]  Phone: [phone] Fax: [phone]      Final Scheduling Details   Colonoscopy prep sent?  Golytely Extended Prep    Procedure scheduled  Colonoscopy    Surgeon:  ALAN    Date of procedure:  1/8/24     Pre-OP / PAC:   No - Not required for this site.    Location  [facility] - Per exclusion criteria.    Sedation   Moderate Sedation - Per order.      Patient Reminders:   You will receive a call from a Nurse to review instructions and health history.  This assessment must be completed prior to your procedure.  Failure to complete the Nurse assessment may result in the procedure being cancelled.      On the day of your procedure, please designate an adult(s) who can drive you home stay with you for the next 24 hours. The medicines used in the exam will make you sleepy. You will not be able to drive.      You cannot take public transportation, ride share services, or non-medical taxi service without a responsible caregiver.  Medical transport services are allowed with the requirement that a responsible caregiver will receive you at your destination.  We require that drivers and caregivers are confirmed prior to your procedure.    "

## 2023-10-18 ENCOUNTER — VIRTUAL VISIT (OUTPATIENT)
Dept: FAMILY MEDICINE | Facility: CLINIC | Age: 48
End: 2023-10-18
Payer: COMMERCIAL

## 2023-10-18 DIAGNOSIS — K62.5 RECTAL BLEEDING: Primary | ICD-10-CM

## 2023-10-18 PROCEDURE — 99213 OFFICE O/P EST LOW 20 MIN: CPT | Mod: VID | Performed by: NURSE PRACTITIONER

## 2023-10-18 NOTE — PROGRESS NOTES
"Leandra is a 48 year old who is being evaluated via a billable video visit.      How would you like to obtain your AVS?   If the video visit is dropped, the invitation should be resent by:   Will anyone else be joining your video visit?           Assessment & Plan     (K62.5) Rectal bleeding  (primary encounter diagnosis)  Comment:   Plan: nifedipine 0.2% in white petrolatum 0.2 % OINT         ointment, Adult Colorectal Surgery          Referral        Will refer back to colorectal surgery and in the meantime, will treat for likely anal fissure, which she has had in the past.  Discussed the use and indication of this medication as well as potential side effects.   She will follow up with scheduled colonoscopy.  Follow up if symptoms persist or worsen.       I spent a total of 20 minutes on the day of the visit.   Time spent by me doing chart review, history and exam, documentation and further activities per the note       BMI:   Estimated body mass index is 44.08 kg/m  as calculated from the following:    Height as of 7/31/23: 1.676 m (5' 6\").    Weight as of 7/11/23: 123.9 kg (273 lb 1.3 oz).           Nelli Nye NP  [facility]    Ariela Mobley is a 48 year old, presenting for the following health issues:  No chief complaint on file.      History of Present Illness       Reason for visit:  Rectal bleeding    She eats 2-3 servings of fruits and vegetables daily.She consumes 1 sweetened beverage(s) daily.She exercises with enough effort to increase her heart rate 20 to 29 minutes per day.  She exercises with enough effort to increase her heart rate 4 days per week.   She is taking medications regularly.    She has been having rectal bleeding and pus intermittently since July.  She has similar episodes in 2017 and 2020.  She did see colorectal both times, found to have an anal fissure that resolved on it's own.   Symptoms are always preceded by a pain in her " buttocks that resolves.  She denies any pain with bowel movements, rectal pain, abdominal pain.  No fevers, shortness of breath, dizziness.    She has a colonoscopy scheduled for 1/8/23.            Review of Systems         Objective           Vitals:  No vitals were obtained today due to virtual visit.    Physical Exam   GENERAL: Healthy, alert and no distress  EYES: Eyes grossly normal to inspection.  No discharge or erythema, or obvious scleral/conjunctival abnormalities.  RESP: No audible wheeze, cough, or visible cyanosis.  No visible retractions or increased work of breathing.    SKIN: Visible skin clear. No significant rash, abnormal pigmentation or lesions.  NEURO: Cranial nerves grossly intact.  Mentation and speech appropriate for age.  PSYCH: Mentation appears normal, affect normal/bright, judgement and insight intact, normal speech and appearance well-groomed.                Video-Visit Details    Type of service:  Video Visit     Originating Location (pt. Location): Home    Distant Location (provider location):  On-site  Platform used for Video Visit: Danielle

## 2023-10-20 ENCOUNTER — TELEPHONE (OUTPATIENT)
Dept: SURGERY | Facility: CLINIC | Age: 48
End: 2023-10-20
Payer: COMMERCIAL

## 2023-10-20 NOTE — TELEPHONE ENCOUNTER
[facility] Call Center    Phone Message    May a detailed message be left on voicemail: yes     Reason for Call: Other: Pt is currently scheduled for an appoint for rectal bleeding. Please review per protocols and call pt to discuss scheduling a new appt if appropriate.     Action Taken: Message routed to:  [facility] JUNIOR DAUGHERTY    Travel Screening: Not Applicable

## 2023-10-26 NOTE — TELEPHONE ENCOUNTER
Diagnosis, Referred by & from: Rectal Bleeding   Appt date: 1/18/2024   NOTES STATUS DETAILS   OFFICE NOTE from referring provider [facility]:  10/18/23, 7/31/23 - PCC OV with Nelli Nye NP   OFFICE NOTE from other specialist [facility]:  1/24/20 - CR OV with Dr. Wadsworth  6/21/17 - CR OV with Maday Zepeda NP    [facility]:  1/4/19 - UC OV with Dr. Preciado   DISCHARGE SUMMARY from hospital N/A    DISCHARGE REPORT from the ER N/A    OPERATIVE REPORT N/A    MEDICATION LIST Internal    LABS     BIOPSIES/PATHOLOGY RELATED TO DIAGNOSIS [facility]:  ([facility]) 1/8/24 - Colon Biopsy   DIAGNOSTIC PROCEDURES     COLONOSCOPY [facility]:  ([facility]) 1/8/24 - Colonoscopy   IMAGING (DISC & REPORT)      MRI [facility]:  6/25/17 - MRI Pelvis   ULTRASOUND  (ENDOANAL/ENDORECTAL) [facility]:  1/9/19 - US Abdomen

## 2023-11-21 ENCOUNTER — ANCILLARY PROCEDURE (OUTPATIENT)
Dept: MAMMOGRAPHY | Facility: CLINIC | Age: 48
End: 2023-11-21
Attending: NURSE PRACTITIONER
Payer: COMMERCIAL

## 2023-11-21 DIAGNOSIS — Z12.31 VISIT FOR SCREENING MAMMOGRAM: ICD-10-CM

## 2023-11-21 PROCEDURE — 77067 SCR MAMMO BI INCL CAD: CPT

## 2023-12-01 ENCOUNTER — ANCILLARY PROCEDURE (OUTPATIENT)
Dept: MAMMOGRAPHY | Facility: CLINIC | Age: 48
End: 2023-12-01
Attending: NURSE PRACTITIONER
Payer: COMMERCIAL

## 2023-12-01 DIAGNOSIS — N64.89 BREAST ASYMMETRY: ICD-10-CM

## 2023-12-01 PROCEDURE — 77061 BREAST TOMOSYNTHESIS UNI: CPT | Mod: RT

## 2023-12-19 ENCOUNTER — E-VISIT (OUTPATIENT)
Dept: FAMILY MEDICINE | Facility: CLINIC | Age: 48
End: 2023-12-19
Payer: COMMERCIAL

## 2023-12-19 ENCOUNTER — MYC MEDICAL ADVICE (OUTPATIENT)
Dept: FAMILY MEDICINE | Facility: CLINIC | Age: 48
End: 2023-12-19

## 2023-12-19 DIAGNOSIS — L98.9 SKIN LESION: Primary | ICD-10-CM

## 2023-12-19 PROCEDURE — 99207 PR NON-BILLABLE SERV PER CHARTING: CPT | Performed by: NURSE PRACTITIONER

## 2023-12-20 ENCOUNTER — MYC MEDICAL ADVICE (OUTPATIENT)
Dept: FAMILY MEDICINE | Facility: CLINIC | Age: 48
End: 2023-12-20
Payer: COMMERCIAL

## 2023-12-20 NOTE — TELEPHONE ENCOUNTER
Triage - Can you call her and schedule her in an Approval Only spot?    Provider E-Visit time total (minutes):    No

## 2023-12-20 NOTE — TELEPHONE ENCOUNTER
Pt called in by phone and was given appt with Rebecca on 12/26 for inperson visit    Aaliyah Mckeon RN, BSN  [facility]

## 2023-12-20 NOTE — TELEPHONE ENCOUNTER
Writer called and left message on patient's voicemail to call back and speak with a triage nurse.    Rebecca Nye message --  Triage - Can you call her and schedule her in an Approval Only spot?     Writer responded via madvertise.      Kelly Dsouza, BSN RN  [facility]

## 2023-12-26 ENCOUNTER — OFFICE VISIT (OUTPATIENT)
Dept: FAMILY MEDICINE | Facility: CLINIC | Age: 48
End: 2023-12-26
Payer: COMMERCIAL

## 2023-12-26 VITALS
BODY MASS INDEX: 44.04 KG/M2 | HEIGHT: 66 IN | WEIGHT: 274.02 LBS | OXYGEN SATURATION: 98 % | RESPIRATION RATE: 18 BRPM | DIASTOLIC BLOOD PRESSURE: 76 MMHG | TEMPERATURE: 97.3 F | HEART RATE: 77 BPM | SYSTOLIC BLOOD PRESSURE: 110 MMHG

## 2023-12-26 DIAGNOSIS — L98.9 SKIN LESION: Primary | ICD-10-CM

## 2023-12-26 PROCEDURE — 88312 SPECIAL STAINS GROUP 1: CPT | Performed by: PATHOLOGY

## 2023-12-26 PROCEDURE — 11400 EXC TR-EXT B9+MARG 0.5 CM<: CPT | Performed by: NURSE PRACTITIONER

## 2023-12-26 PROCEDURE — 90480 ADMN SARSCOV2 VAC 1/ONLY CMP: CPT | Performed by: NURSE PRACTITIONER

## 2023-12-26 PROCEDURE — 91320 SARSCV2 VAC 30MCG TRS-SUC IM: CPT | Performed by: NURSE PRACTITIONER

## 2023-12-26 PROCEDURE — 88305 TISSUE EXAM BY PATHOLOGIST: CPT | Performed by: PATHOLOGY

## 2023-12-26 PROCEDURE — 90746 HEPB VACCINE 3 DOSE ADULT IM: CPT | Performed by: NURSE PRACTITIONER

## 2023-12-26 PROCEDURE — 90471 IMMUNIZATION ADMIN: CPT | Performed by: NURSE PRACTITIONER

## 2023-12-26 ASSESSMENT — PAIN SCALES - GENERAL: PAINLEVEL: NO PAIN (0)

## 2023-12-26 ASSESSMENT — PATIENT HEALTH QUESTIONNAIRE - PHQ9
10. IF YOU CHECKED OFF ANY PROBLEMS, HOW DIFFICULT HAVE THESE PROBLEMS MADE IT FOR YOU TO DO YOUR WORK, TAKE CARE OF THINGS AT HOME, OR GET ALONG WITH OTHER PEOPLE: SOMEWHAT DIFFICULT
SUM OF ALL RESPONSES TO PHQ QUESTIONS 1-9: 6
SUM OF ALL RESPONSES TO PHQ QUESTIONS 1-9: 6

## 2023-12-26 NOTE — PROGRESS NOTES
"  Assessment & Plan     (L98.9) Skin lesion  (primary encounter diagnosis)  Comment: changing  Plan: Surgical Pathology Exam, EXC BENIGN SKIN LESION        TRUNK/ARM/LEG 0.6-1.0 CM        Verbal consent obtained after explanation of benefits and risks.  The area was cleansed with Betadine and anesthetized with 2% lidocaine with epi.  Lesion was removed by shave biopsy by sterile technique and sent to pathology.  Hemostasis achieved with lumicain.  Area dressed with Bacitracin and a gauze dressing applied.  Discussed skin care and instructed to observe for any signs and symptoms of infection.  Advised to schedule an appointment with dermatology for skin check.                BMI:   Estimated body mass index is 44.25 kg/m  as calculated from the following:    Height as of this encounter: 1.676 m (5' 5.98\").    Weight as of this encounter: 124.3 kg (274 lb 0.3 oz).           Nelli Nye NP  [facility]    Ariela Mobley is a 48 year old, presenting for the following health issues:  Mole        12/26/2023     9:54 AM   Additional Questions   Roomed by Vannessa       History of Present Illness       Reason for visit:  Mole change - black and painful  Symptom onset:  1-2 weeks ago  Symptoms include:  Painful mole  Symptom intensity:  Moderate  Symptom progression:  Worsening  Had these symptoms before:  No    She eats 2-3 servings of fruits and vegetables daily.She consumes 0 sweetened beverage(s) daily.She exercises with enough effort to increase her heart rate 20 to 29 minutes per day.  She exercises with enough effort to increase her heart rate 4 days per week.   She is taking medications regularly.     She has a mole on her right side that has been present for as long as she remembers, but now has changed.  It is dark in color and larger.  She has melanoma in extended family, not first degree relatives.  This lesion has not bled.              Review of Systems         Objective  " "  /76 (BP Location: Right arm, Patient Position: Sitting, Cuff Size: Adult Large)   Pulse 77   Temp 97.3  F (36.3  C) (Temporal)   Resp 18   Ht 1.676 m (5' 5.98\")   Wt 124.3 kg (274 lb 0.3 oz)   LMP 12/12/2023 (Exact Date)   SpO2 98%   Breastfeeding No   BMI 44.25 kg/m    Body mass index is 44.25 kg/m .  Physical Exam   GENERAL: healthy, alert and no distress  SKIN: skin lesion approx 7 mm x 4 mm on  PSYCH: mentation appears normal, affect normal                      "

## 2023-12-27 ENCOUNTER — TELEPHONE (OUTPATIENT)
Dept: GASTROENTEROLOGY | Facility: CLINIC | Age: 48
End: 2023-12-27
Payer: COMMERCIAL

## 2023-12-27 DIAGNOSIS — Z12.11 SCREEN FOR COLON CANCER: Primary | ICD-10-CM

## 2023-12-27 RX ORDER — BISACODYL 5 MG/1
TABLET, DELAYED RELEASE ORAL
Qty: 4 TABLET | Refills: 0 | Status: SHIPPED | OUTPATIENT
Start: 2023-12-27 | End: 2024-01-18

## 2023-12-27 NOTE — TELEPHONE ENCOUNTER
Pre assessment completed for upcoming procedure.   (Please see previous telephone encounter notes for complete details)    Patient  returned call.       Procedure details:    Arrival time and facility location reviewed.    Pre op exam needed? N/A    Designated  policy reviewed. Instructed to have someone stay 6 hours post procedure.     COVID policy reviewed.      Medication review:    NSAID medication(s): Meloxicam (Mobic): HOLD 10 days before procedure.      Prep for procedure:     Procedure prep instructions reviewed.        Additional information needed?  N/A      Patient  verbalized understanding and had no questions or concerns at this time.      Valeria Tolentino RN  Endoscopy Procedure Pre Assessment RN  [phone] option 4

## 2023-12-27 NOTE — TELEPHONE ENCOUNTER
Pre visit planning completed.      Procedure details:    Patient scheduled for Colonoscopy  on 1/8/2024.     Arrival time: 0830. Procedure time 0930    Pre op exam needed? N/A    Facility location: [facility]; [address], [address], [place] [address]    Sedation type: Conscious sedation     Indication for procedure: Screen for colon cancer [Z12.11]       Chart review:     Electronic implanted devices? No    Recent diagnosis of diverticulitis within the last 6 weeks? No    Diabetic? No    Diabetic medication HOLDING recommendations: (if applicable)  Oral diabetic medications: N/A  Diabetic injectables: N/A  Insulin: N/A      Medication review:    Anticoagulants? No    NSAIDS? Yes.  Meloxicam (Mobic).  Holding interval of 10 days.    Other medication HOLDING recommendations:  N/A      Prep for procedure:     Bowel prep recommendation: Extended prep Golytely    Due to:  BMI > 40.     Prep instructions sent via [employer]     Bowel prep script sent to    [facility] #[id number] - [place], [place] - [address]  AT [facility] RENATE Campbell RN  Endoscopy Procedure Pre Assessment RN  [phone] option 4

## 2023-12-27 NOTE — TELEPHONE ENCOUNTER
Attempted to contact patient in order to complete pre assessment questions.     No answer. Left message to return call to [phone] option 4    Maria Fernanda Campbell RN  Endoscopy Procedure Pre Assessment RN

## 2023-12-28 ENCOUNTER — MYC MEDICAL ADVICE (OUTPATIENT)
Dept: SURGERY | Facility: CLINIC | Age: 48
End: 2023-12-28
Payer: COMMERCIAL

## 2023-12-29 LAB
PATH REPORT.COMMENTS IMP SPEC: NORMAL
PATH REPORT.FINAL DX SPEC: NORMAL
PATH REPORT.GROSS SPEC: NORMAL
PATH REPORT.MICROSCOPIC SPEC OTHER STN: NORMAL
PATH REPORT.RELEVANT HX SPEC: NORMAL
PHOTO IMAGE: NORMAL

## 2024-01-03 ENCOUNTER — DOCUMENTATION ONLY (OUTPATIENT)
Dept: SLEEP MEDICINE | Facility: CLINIC | Age: 49
End: 2024-01-03
Payer: COMMERCIAL

## 2024-01-03 DIAGNOSIS — G47.33 OBSTRUCTIVE SLEEP APNEA: Primary | ICD-10-CM

## 2024-01-08 ENCOUNTER — HOSPITAL ENCOUNTER (OUTPATIENT)
Facility: CLINIC | Age: 49
Discharge: HOME | End: 2024-01-08
Attending: INTERNAL MEDICINE | Admitting: INTERNAL MEDICINE
Payer: COMMERCIAL

## 2024-01-08 VITALS
OXYGEN SATURATION: 97 % | HEART RATE: 72 BPM | SYSTOLIC BLOOD PRESSURE: 124 MMHG | DIASTOLIC BLOOD PRESSURE: 70 MMHG | RESPIRATION RATE: 16 BRPM

## 2024-01-08 LAB — COLONOSCOPY: NORMAL

## 2024-01-08 PROCEDURE — 88305 TISSUE EXAM BY PATHOLOGIST: CPT | Mod: TC | Performed by: INTERNAL MEDICINE

## 2024-01-08 PROCEDURE — 45380 COLONOSCOPY AND BIOPSY: CPT | Performed by: INTERNAL MEDICINE

## 2024-01-08 PROCEDURE — 250N000011 HC RX IP 250 OP 636: Performed by: INTERNAL MEDICINE

## 2024-01-08 PROCEDURE — 88305 TISSUE EXAM BY PATHOLOGIST: CPT | Mod: 26 | Performed by: PATHOLOGY

## 2024-01-08 PROCEDURE — G0500 MOD SEDAT ENDO SERVICE >5YRS: HCPCS | Performed by: INTERNAL MEDICINE

## 2024-01-08 RX ORDER — DIPHENHYDRAMINE HYDROCHLORIDE 50 MG/ML
INJECTION INTRAMUSCULAR; INTRAVENOUS PRN
Status: DISCONTINUED | OUTPATIENT
Start: 2024-01-08 | End: 2024-01-08 | Stop reason: HOSPADM

## 2024-01-08 RX ORDER — FENTANYL CITRATE 50 UG/ML
INJECTION, SOLUTION INTRAMUSCULAR; INTRAVENOUS PRN
Status: DISCONTINUED | OUTPATIENT
Start: 2024-01-08 | End: 2024-01-08 | Stop reason: HOSPADM

## 2024-01-08 ASSESSMENT — ACTIVITIES OF DAILY LIVING (ADL)
ADLS_ACUITY_SCORE: 35
ADLS_ACUITY_SCORE: 35

## 2024-01-08 NOTE — H&P
ENDOSCOPY PRE-SEDATION H&P FOR OUTPATIENT PROCEDURES    Leandra ROSADO Aries  [id number]  1975    Procedure: Colonoscopy    Pre-procedure diagnosis: Rectal bleeding    Past medical history:   Past Medical History:   Diagnosis Date    Conductive hearing loss 01/30/2020    Not sure which kind of hearing loss    Depressive disorder     Hoarseness 12/15/2019    Not all the time    LSIL (low grade squamous intraepithelial lesion) on Pap smear 2008    colp neg    Migraines     Mild major depression (H24) 07/31/2018    Obstructive sleep apnea 01/2011    moderate    PCOS (polycystic ovarian syndrome)     Pneumonia     Sensorineural hearing loss 01/30/2020    Not sure which kind of hearing loss     Patient Active Problem List   Diagnosis    PCOS (polycystic ovarian syndrome)    Vitamin D deficiency    Insomnia    CARDIOVASCULAR SCREENING; LDL GOAL LESS THAN 160    Migraine headache    Obesity    BMI of 40.0-44.9, adult (H)    Moderate major depression (H)    SERGE (obstructive sleep apnea)    TMJ disease    Tobacco dependence in remission    Specific phobia    Seasonal affective disorder (H24)    PTSD (post-traumatic stress disorder)    Overdose, drug       Past surgical history:   Past Surgical History:   Procedure Laterality Date    NO HISTORY OF SURGERY         No current facility-administered medications for this encounter.       Allergies   Allergen Reactions    Penicillin [Penicillins]     Trazodone      Other reaction(s): Headache  Pt reports after taking trazodone she had a migraine requiring IM medication    Shellfish-Derived Products Rash       Physical Exam:    Mental status: alert  Heart: Normal  Lung: Normal  Assessment of patient's airway: Normal  Other as pertinent for procedure: None     Lab Results   Component Value Date    WBC 5.0 07/25/2022    WBC 8.1 06/25/2020     Lab Results   Component Value Date    RBC 4.70 07/25/2022    RBC 4.49 06/25/2020     Lab Results   Component Value Date    HGB 13.9 07/25/2022     HGB 13.7 06/25/2020     Lab Results   Component Value Date    HCT 43.7 07/25/2022    HCT 42.3 06/25/2020     Lab Results   Component Value Date    MCV 93 07/25/2022    MCV 94 06/25/2020     Lab Results   Component Value Date    MCH 29.6 07/25/2022    MCH 30.5 06/25/2020     Lab Results   Component Value Date    MCHC 31.8 07/25/2022    MCHC 32.4 06/25/2020     Lab Results   Component Value Date    RDW 13.0 07/25/2022    RDW 12.8 06/25/2020     Lab Results   Component Value Date     07/25/2022     06/25/2020     INR   Date Value Ref Range Status   06/25/2020 0.97 0.86 - 1.14 Final        ASA Score: See Provation note    Mallampati score:  III - Only soft palate is visible. Intubation is predicted to be difficult    Assessment/Plan:     The patient is an appropriate candidate to receive sedation.    Informed consent was discussed with the patient/family, including the risks, benefits, potential complications and any alternative options associated with sedation.    Patient assessment completed just prior to sedation and while under constant observation by the provider. Condition determined to be adequate for proceeding with sedation.    The specific risks for the procedure were discussed with the patient at the time of informed consent and include but are not limited to perforation which could require surgery, missing significant neoplasm or lesion, hemorrhage and adverse sedative complication.      Himanshu Pop MD

## 2024-01-09 LAB
PATH REPORT.COMMENTS IMP SPEC: NORMAL
PATH REPORT.COMMENTS IMP SPEC: NORMAL
PATH REPORT.FINAL DX SPEC: NORMAL
PATH REPORT.GROSS SPEC: NORMAL
PATH REPORT.MICROSCOPIC SPEC OTHER STN: NORMAL
PATH REPORT.RELEVANT HX SPEC: NORMAL
PHOTO IMAGE: NORMAL

## 2024-01-09 NOTE — RESULT ENCOUNTER NOTE
"Ms. Chris  -     I am writing to let you know the results of the the polyps that were removed at the time of your colonoscopy.  One or more of the polyps returned as an \"adenomatous polyp\".  An adenoma is a type of benign polyp. If left in place for years, adenomas have a small risk of developing cancer.  There was no cancer in your polyps.  The other polyps returned as hyperplastic polyps.  Hyperplastic polyps are not considered precancerous polyps and therefore need no further follow-up.  However, because of your adenoma(s), you are at risk to grow more adenomatous polyps in the future.      Because of this I recommend that you repeat a colonoscopy to screen for new polyps in seven (7) years.  I would discuss this with your primary care provider at that time.     If you have any questions, please don't hesitate to contact the Gastroenterology nurse at [phone].       Himanshu Pop MD  Professor of Medicine  [facility]  Division of Gastroenterology, Hepatology and Nutrition "

## 2024-01-18 ENCOUNTER — PRE VISIT (OUTPATIENT)
Dept: SURGERY | Facility: CLINIC | Age: 49
End: 2024-01-18

## 2024-01-18 ENCOUNTER — OFFICE VISIT (OUTPATIENT)
Dept: SURGERY | Facility: CLINIC | Age: 49
End: 2024-01-18
Attending: NURSE PRACTITIONER
Payer: COMMERCIAL

## 2024-01-18 VITALS
BODY MASS INDEX: 44.36 KG/M2 | WEIGHT: 276 LBS | OXYGEN SATURATION: 98 % | DIASTOLIC BLOOD PRESSURE: 84 MMHG | SYSTOLIC BLOOD PRESSURE: 141 MMHG | HEIGHT: 66 IN | HEART RATE: 80 BPM

## 2024-01-18 DIAGNOSIS — K62.5 RECTAL BLEEDING: ICD-10-CM

## 2024-01-18 DIAGNOSIS — K60.30 ANAL FISTULA: Primary | ICD-10-CM

## 2024-01-18 DIAGNOSIS — K61.2 ABSCESS OF ANAL AND RECTAL REGIONS: Primary | ICD-10-CM

## 2024-01-18 DIAGNOSIS — K60.30 ANAL FISTULA: ICD-10-CM

## 2024-01-18 PROCEDURE — 46600 DIAGNOSTIC ANOSCOPY SPX: CPT | Performed by: NURSE PRACTITIONER

## 2024-01-18 PROCEDURE — 99203 OFFICE O/P NEW LOW 30 MIN: CPT | Mod: 25 | Performed by: NURSE PRACTITIONER

## 2024-01-18 ASSESSMENT — PAIN SCALES - GENERAL: PAINLEVEL: NO PAIN (0)

## 2024-01-18 NOTE — LETTER
"2024       RE: Leandra Chris   [facility] [address]       Dear Colleague,    Thank you for referring your patient, Leandra Chris, to the [facility] [place] at [facility]. Please see a copy of my visit note below.    Colon and Rectal Surgery Consult Clinic Note    Date: 2024     Referring provider:  Nelli Nye, NP  [address], [facility] [address]  [place] [address]     RE: Leandra Chris  : 1975  CIRILO: 2024    Leandra Chris is a very pleasant 49 year old female here for anal fistula.    HPI:  I saw Leandra in 2017 with concern for an anal fistula.   MR Pelvis 2017:  IMPRESSION:   1. No evidence of perianal fistula or abscess.  2. Mild linear irregularity at the anterior anus, with an appearance  suggesting an anal fissure.  3. Polycystic ovaries.  Has bright red blood with bowel movements a few times a week. Gets pain in her right buttock and then gets a lot of blood but it then resolves. Can feel bumps when she wipes. Has mucous leakage also.  Colonoscopy 24 with three tubular adenomas.     Physical Examination:  BP (!) 141/84   Pulse 80   Ht 5' 6\"   Wt 276 lb   LMP 2023 (Exact Date)   SpO2 98%   BMI 44.55 kg/m    General: alert, oriented, in no acute distress, sitting comfortably  HEENT: mucous membranes moist    Perianal external examination: Exam was chaperoned by Joann Ling RN   Induration in the right anterior position. With palpation of this, purulent drainage results from the anterior anal canal at a site of granulation tissue with ulceration and small skin tag.    Digital rectal examination: Was performed.   Ulceration palpable in the anterior anal canal.    Anoscopy: Was performed.   Ulceration with purulent drainage and hypergranulation tissue in the anterior anal canal    Assessment/Plan: 49 year old female with anal abscess/fistula. Actively draining " through internal opening and induration externally in the right anterior position but no external opening. She has been symptomatic from this for several years. Advised EUA with possible fistulotomy and possible seton. No evidence of external opening so also may need to just make the internal opening larger to help facilitate drainage. We discussed this in detail. 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 Patient's questions were answered to her stated satisfaction and she is in agreement with this plan.     Medical history:  Past Medical History:   Diagnosis Date    Conductive hearing loss 01/30/2020    Not sure which kind of hearing loss    Depressive disorder     Hoarseness 12/15/2019    Not all the time    LSIL (low grade squamous intraepithelial lesion) on Pap smear 2008    colp neg    Migraines     Mild major depression (H24) 07/31/2018    Obstructive sleep apnea 01/2011    moderate    PCOS (polycystic ovarian syndrome)     Pneumonia     Sensorineural hearing loss 01/30/2020    Not sure which kind of hearing loss       Surgical history:  Past Surgical History:   Procedure Laterality Date    COLONOSCOPY N/A 1/8/2024    Procedure: Colonoscopy;  Surgeon: Himanshu Pop MD;  Location: [facility]    NO HISTORY OF SURGERY         Problem list:    Patient Active Problem List    Diagnosis Date Noted    SERGE (obstructive sleep apnea) 08/05/2021     Priority: Medium     PSG [facility] 1/13/2011  Wt 253 BMI 39.7  AHI 21 RDI 48.8 Lowest O2 sat 72 %        TMJ disease 08/05/2021     Priority: Medium    Seasonal affective disorder (H24) 11/22/2019     Priority: Medium    Tobacco dependence in remission 10/21/2019     Priority: Medium    Specific phobia 10/21/2019     Priority: Medium     Flying      PTSD (post-traumatic stress disorder) 10/21/2019     Priority: Medium    Moderate major depression (H)  09/26/2019     Priority: Medium    BMI of 40.0-44.9, adult (H) 04/04/2017     Priority: Medium    Migraine headache 10/21/2011     Priority: Medium    Obesity 10/21/2011     Priority: Medium    CARDIOVASCULAR SCREENING; LDL GOAL LESS THAN 160 10/31/2010     Priority: Medium    Insomnia 07/08/2010     Priority: Medium    Vitamin D deficiency 04/09/2009     Priority: Medium    Overdose, drug 03/07/2009     Priority: Medium    PCOS (polycystic ovarian syndrome) 02/20/2009     Priority: Medium       Medications:  Current Outpatient Medications   Medication Sig Dispense Refill    Acetaminophen (TYLENOL PO) Take by mouth every 4 hours as needed for mild pain or fever Reported on 4/4/2017      albuterol (PROAIR HFA/PROVENTIL HFA/VENTOLIN HFA) 108 (90 BASE) MCG/ACT Inhaler Inhale 2 puffs into the lungs every 6 hours      ALPRAZolam (XANAX) 0.5 MG tablet Take 1-2 tablets 45 minutes prior to flight 20 tablet 0    butalbital-aspirin-caffeine (FIORINAL) -40 MG capsule TAKE 1 CAPSULE BY MOUTH EVERY 4 HOURS AS NEEDED FOR PAIN 20 capsule 0    meloxicam (MOBIC) 7.5 MG tablet Take 1 tablet (7.5 mg) by mouth daily 30 tablet 12    nifedipine 0.2% in white petrolatum 0.2 % OINT ointment Apply topically 2 times daily 100 g 1    zolpidem (AMBIEN) 10 MG tablet TAKE 1 TABLET BY MOUTH EVERY NIGHT AS NEEDED FOR SLEEP 30 tablet 5       Allergies:  Allergies   Allergen Reactions    Penicillin [Penicillins]     Trazodone      Other reaction(s): Headache  Pt reports after taking trazodone she had a migraine requiring IM medication    Shellfish-Derived Products Rash       Family history:  Family History   Problem Relation Age of Onset    Prostate Cancer Father     Snoring Father     Asthma Mother     Chronic Obstructive Pulmonary Disease Mother     Depression Mother     Migraines Sister     Depression Sister     Anxiety Disorder Sister     Substance Abuse Brother     Depression Brother     Hypertension Maternal Grandmother      Cerebrovascular Disease Maternal Grandmother         Several mini strokes    Dementia Maternal Grandmother     Cerebrovascular Disease Other         Massive stroke    Other Cancer Cousin         Brain cancer    Other Cancer Paternal Grandmother         Brain cancer    Depression Niece     Anxiety Disorder Niece     C.A.D. No family hx of     Diabetes No family hx of     Breast Cancer No family hx of     Cancer - colorectal No family hx of        Social history:  Social History     Tobacco Use    Smoking status: Former     Packs/day: 0.50     Years: 13.00     Additional pack years: 0.00     Total pack years: 6.50     Types: Cigarettes, Clove cigarettes or kreteks     Start date: 1999     Quit date: 2012     Years since quittin.9    Smokeless tobacco: Never   Substance Use Topics    Alcohol use: Yes     Comment: rarely    Marital status: .    There are no exam notes on file for this visit.     20 minutes spent on the date of encounter performing chart review, history and exam, documentation and further activities as noted above with an additional 2 minutes for anoscopy.     This note was created using speech recognition software and may contain unintended word substitutions.        Again, thank you for allowing me to participate in the care of your patient.      Sincerely,    EDISON Arriola CNP

## 2024-01-18 NOTE — PROGRESS NOTES
"Colon and Rectal Surgery Consult Clinic Note    Date: 2024     Referring provider:  Nelli Nye, NP  [address], [address]  [place] [address]     RE: Leandra Chris  : 1975  CIRILO: 2024    Leandra Chris is a very pleasant 49 year old female here for anal fistula.    HPI:  I saw Leandra in 2017 with concern for an anal fistula.   MR Pelvis 2017:  IMPRESSION:   1. No evidence of perianal fistula or abscess.  2. Mild linear irregularity at the anterior anus, with an appearance  suggesting an anal fissure.  3. Polycystic ovaries.  Has bright red blood with bowel movements a few times a week. Gets pain in her right buttock and then gets a lot of blood but it then resolves. Can feel bumps when she wipes. Has mucous leakage also.  Colonoscopy 24 with three tubular adenomas.     Physical Examination:  BP (!) 141/84   Pulse 80   Ht 5' 6\"   Wt 276 lb   LMP 2023 (Exact Date)   SpO2 98%   BMI 44.55 kg/m    General: alert, oriented, in no acute distress, sitting comfortably  HEENT: mucous membranes moist    Perianal external examination: Exam was chaperoned by Joann Ling RN   Induration in the right anterior position. With palpation of this, purulent drainage results from the anterior anal canal at a site of granulation tissue with ulceration and small skin tag.    Digital rectal examination: Was performed.   Ulceration palpable in the anterior anal canal.    Anoscopy: Was performed.   Ulceration with purulent drainage and hypergranulation tissue in the anterior anal canal    Assessment/Plan: 49 year old female with anal abscess/fistula. Actively draining through internal opening and induration externally in the right anterior position but no external opening. She has been symptomatic from this for several years. Advised EUA with possible fistulotomy and possible seton. No evidence of external opening so also may need to just make the internal opening larger to help facilitate " drainage. We discussed this in detail. 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 Patient's questions were answered to her stated satisfaction and she is in agreement with this plan.     Medical history:  Past Medical History:   Diagnosis Date    Conductive hearing loss 01/30/2020    Not sure which kind of hearing loss    Depressive disorder     Hoarseness 12/15/2019    Not all the time    LSIL (low grade squamous intraepithelial lesion) on Pap smear 2008    colp neg    Migraines     Mild major depression (H24) 07/31/2018    Obstructive sleep apnea 01/2011    moderate    PCOS (polycystic ovarian syndrome)     Pneumonia     Sensorineural hearing loss 01/30/2020    Not sure which kind of hearing loss       Surgical history:  Past Surgical History:   Procedure Laterality Date    COLONOSCOPY N/A 1/8/2024    Procedure: Colonoscopy;  Surgeon: Himanshu Pop MD;  Location: [facility]    NO HISTORY OF SURGERY         Problem list:    Patient Active Problem List    Diagnosis Date Noted    SERGE (obstructive sleep apnea) 08/05/2021     Priority: Medium     PSG [facility] 1/13/2011  Wt 253 BMI 39.7  AHI 21 RDI 48.8 Lowest O2 sat 72 %        TMJ disease 08/05/2021     Priority: Medium    Seasonal affective disorder (H24) 11/22/2019     Priority: Medium    Tobacco dependence in remission 10/21/2019     Priority: Medium    Specific phobia 10/21/2019     Priority: Medium     Flying      PTSD (post-traumatic stress disorder) 10/21/2019     Priority: Medium    Moderate major depression (H) 09/26/2019     Priority: Medium    BMI of 40.0-44.9, adult (H) 04/04/2017     Priority: Medium    Migraine headache 10/21/2011     Priority: Medium    Obesity 10/21/2011     Priority: Medium    CARDIOVASCULAR SCREENING; LDL GOAL LESS THAN 160 10/31/2010     Priority: Medium    Insomnia 07/08/2010     Priority: Medium    Vitamin D  deficiency 04/09/2009     Priority: Medium    Overdose, drug 03/07/2009     Priority: Medium    PCOS (polycystic ovarian syndrome) 02/20/2009     Priority: Medium       Medications:  Current Outpatient Medications   Medication Sig Dispense Refill    Acetaminophen (TYLENOL PO) Take by mouth every 4 hours as needed for mild pain or fever Reported on 4/4/2017      albuterol (PROAIR HFA/PROVENTIL HFA/VENTOLIN HFA) 108 (90 BASE) MCG/ACT Inhaler Inhale 2 puffs into the lungs every 6 hours      ALPRAZolam (XANAX) 0.5 MG tablet Take 1-2 tablets 45 minutes prior to flight 20 tablet 0    butalbital-aspirin-caffeine (FIORINAL) -40 MG capsule TAKE 1 CAPSULE BY MOUTH EVERY 4 HOURS AS NEEDED FOR PAIN 20 capsule 0    meloxicam (MOBIC) 7.5 MG tablet Take 1 tablet (7.5 mg) by mouth daily 30 tablet 12    nifedipine 0.2% in white petrolatum 0.2 % OINT ointment Apply topically 2 times daily 100 g 1    zolpidem (AMBIEN) 10 MG tablet TAKE 1 TABLET BY MOUTH EVERY NIGHT AS NEEDED FOR SLEEP 30 tablet 5       Allergies:  Allergies   Allergen Reactions    Penicillin [Penicillins]     Trazodone      Other reaction(s): Headache  Pt reports after taking trazodone she had a migraine requiring IM medication    Shellfish-Derived Products Rash       Family history:  Family History   Problem Relation Age of Onset    Prostate Cancer Father     Snoring Father     Asthma Mother     Chronic Obstructive Pulmonary Disease Mother     Depression Mother     Migraines Sister     Depression Sister     Anxiety Disorder Sister     Substance Abuse Brother     Depression Brother     Hypertension Maternal Grandmother     Cerebrovascular Disease Maternal Grandmother         Several mini strokes    Dementia Maternal Grandmother     Cerebrovascular Disease Other         Massive stroke    Other Cancer Cousin         Brain cancer    Other Cancer Paternal Grandmother         Brain cancer    Depression Niece     Anxiety Disorder Niece     C.A.D. No family hx of      Diabetes No family hx of     Breast Cancer No family hx of     Cancer - colorectal No family hx of        Social history:  Social History     Tobacco Use    Smoking status: Former     Packs/day: 0.50     Years: 13.00     Additional pack years: 0.00     Total pack years: 6.50     Types: Cigarettes, Clove cigarettes or kreteks     Start date: 1999     Quit date: 2012     Years since quittin.9    Smokeless tobacco: Never   Substance Use Topics    Alcohol use: Yes     Comment: rarely    Marital status: .    There are no exam notes on file for this visit.     20 minutes spent on the date of encounter performing chart review, history and exam, documentation and further activities as noted above with an additional 2 minutes for anoscopy.     EDISON Shaikh, NP-C  Colon and Rectal Surgery   [facility]    This note was created using speech recognition software and may contain unintended word substitutions.

## 2024-01-18 NOTE — PROGRESS NOTES
"Colon and Rectal Surgery Consult Clinic Note    Date: 2024     Referring provider:  Nelli Nye, NP  [address], [facility] [address]  [place] [address]     RE: Leandra Chris  : 1975  CIRILO: 2024    Leandra Chris is a very pleasant 49 year old female { :[id number]} ***    ***  Colonoscopy 2024 with three tubular adenomas.     Physical Examination:  BP (!) 141/84   Pulse 80   Ht 5' 6\"   Wt 276 lb   LMP 2023 (Exact Date)   SpO2 98%   BMI 44.55 kg/m    General: ***  HEENT: ***  Abdomen: ***  Extremities: ***    Perianal external examination: Exam was chaperoned by ***  Induration in the right anterior perianal position. With palpation of this, there is a moderate amount of purulent drainage resulting from the anterior anal canal at the site of a small ulceration and skin tag.  Digital rectal examination: {Was performed/was not performed/etc:012460}    Anoscopy: {Was performed/was not performed/etc:938687011}    Procedures:  ***  Assessment/Plan: 49 year old female { :[id number]} with a recent diagnosis of  ***.   ***    Medical history:  Past Medical History:   Diagnosis Date    Conductive hearing loss 2020    Not sure which kind of hearing loss    Depressive disorder     Hoarseness 12/15/2019    Not all the time    LSIL (low grade squamous intraepithelial lesion) on Pap smear     colp neg    Migraines     Mild major depression (H24) 2018    Obstructive sleep apnea 2011    moderate    PCOS (polycystic ovarian syndrome)     Pneumonia     Sensorineural hearing loss 2020    Not sure which kind of hearing loss       Surgical history:  Past Surgical History:   Procedure Laterality Date    COLONOSCOPY N/A 2024    Procedure: Colonoscopy;  Surgeon: Himanshu Pop MD;  Location: [facility]    NO HISTORY OF SURGERY         Problem list:    Patient Active Problem List    Diagnosis Date Noted    SERGE (obstructive sleep apnea) 2021     Priority: Medium     PSG [facility] " 1/13/2011  Wt 253 BMI 39.7  AHI 21 RDI 48.8 Lowest O2 sat 72 %        TMJ disease 08/05/2021     Priority: Medium    Seasonal affective disorder (H24) 11/22/2019     Priority: Medium    Tobacco dependence in remission 10/21/2019     Priority: Medium    Specific phobia 10/21/2019     Priority: Medium     Flying      PTSD (post-traumatic stress disorder) 10/21/2019     Priority: Medium    Moderate major depression (H) 09/26/2019     Priority: Medium    BMI of 40.0-44.9, adult (H) 04/04/2017     Priority: Medium    Migraine headache 10/21/2011     Priority: Medium    Obesity 10/21/2011     Priority: Medium    CARDIOVASCULAR SCREENING; LDL GOAL LESS THAN 160 10/31/2010     Priority: Medium    Insomnia 07/08/2010     Priority: Medium    Vitamin D deficiency 04/09/2009     Priority: Medium    Overdose, drug 03/07/2009     Priority: Medium    PCOS (polycystic ovarian syndrome) 02/20/2009     Priority: Medium       Medications:  Current Outpatient Medications   Medication Sig Dispense Refill    Acetaminophen (TYLENOL PO) Take by mouth every 4 hours as needed for mild pain or fever Reported on 4/4/2017      albuterol (PROAIR HFA/PROVENTIL HFA/VENTOLIN HFA) 108 (90 BASE) MCG/ACT Inhaler Inhale 2 puffs into the lungs every 6 hours      ALPRAZolam (XANAX) 0.5 MG tablet Take 1-2 tablets 45 minutes prior to flight 20 tablet 0    butalbital-aspirin-caffeine (FIORINAL) -40 MG capsule TAKE 1 CAPSULE BY MOUTH EVERY 4 HOURS AS NEEDED FOR PAIN 20 capsule 0    meloxicam (MOBIC) 7.5 MG tablet Take 1 tablet (7.5 mg) by mouth daily 30 tablet 12    nifedipine 0.2% in white petrolatum 0.2 % OINT ointment Apply topically 2 times daily 100 g 1    zolpidem (AMBIEN) 10 MG tablet TAKE 1 TABLET BY MOUTH EVERY NIGHT AS NEEDED FOR SLEEP 30 tablet 5       Allergies:  Allergies   Allergen Reactions    Penicillin [Penicillins]     Trazodone      Other reaction(s): Headache  Pt reports after taking trazodone she had a migraine requiring IM  medication    Shellfish-Derived Products Rash       Family history:  Family History   Problem Relation Age of Onset    Prostate Cancer Father     Snoring Father     Asthma Mother     Chronic Obstructive Pulmonary Disease Mother     Depression Mother     Migraines Sister     Depression Sister     Anxiety Disorder Sister     Substance Abuse Brother     Depression Brother     Hypertension Maternal Grandmother     Cerebrovascular Disease Maternal Grandmother         Several mini strokes    Dementia Maternal Grandmother     Cerebrovascular Disease Other         Massive stroke    Other Cancer Cousin         Brain cancer    Other Cancer Paternal Grandmother         Brain cancer    Depression Niece     Anxiety Disorder Niece     C.A.D. No family hx of     Diabetes No family hx of     Breast Cancer No family hx of     Cancer - colorectal No family hx of        Social history:  Social History     Tobacco Use    Smoking status: Former     Packs/day: 0.50     Years: 13.00     Additional pack years: 0.00     Total pack years: 6.50     Types: Cigarettes, Clove cigarettes or kreteks     Start date: 1999     Quit date: 2012     Years since quittin.9    Smokeless tobacco: Never   Substance Use Topics    Alcohol use: Yes     Comment: rarely    Marital status: .  Occupation: ***.    There are no exam notes on file for this visit.     *** minutes spent on the date of encounter performing chart review, history and exam, documentation and further activities as noted above with an additional *** for ***.     EDISON Shaikh, NP-C  Colon and Rectal Surgery   [facility]    This note was created using speech recognition software and may contain unintended word substitutions.

## 2024-01-23 ENCOUNTER — TELEPHONE (OUTPATIENT)
Dept: SURGERY | Facility: CLINIC | Age: 49
End: 2024-01-23
Payer: COMMERCIAL

## 2024-01-23 NOTE — TELEPHONE ENCOUNTER
Per Case Request, patient's surgery must be scheduled at [facility] OR d/t borderline BMI (too high for [facility] OR's BMI cut off).    On 1/23/2024 at 2:52 PM:  [facility] for return call regarding scheduling.    Provided call back number [phone].    Mai Wiley  [facility]-op Coordinator  [facility]  Direct Phone: [phone]

## 2024-01-24 ENCOUNTER — TELEPHONE (OUTPATIENT)
Dept: SURGERY | Facility: CLINIC | Age: 49
End: 2024-01-24
Payer: COMMERCIAL

## 2024-01-24 ENCOUNTER — HOSPITAL ENCOUNTER (OUTPATIENT)
Facility: CLINIC | Age: 49
End: 2024-01-24
Attending: COLON & RECTAL SURGERY | Admitting: COLON & RECTAL SURGERY
Payer: COMMERCIAL

## 2024-01-24 PROBLEM — K60.30 ANAL FISTULA: Status: ACTIVE | Noted: 2024-01-18

## 2024-01-24 NOTE — TELEPHONE ENCOUNTER
On 1/24/2024 at 10:05 AM:  Surgery scheduled 3/11/2024, related appts scheduled, will send Surgery Packet.    Mai Wiley  Lee Ann-op Coordinator  [facility]  Direct Phone: [phone]

## 2024-01-24 NOTE — TELEPHONE ENCOUNTER
FUTURE VISIT INFORMATION      SURGERY INFORMATION:  Date: 3/11/24  [facility]  Surgeon:  Carmen Magana MD   Anesthesia Type:  mac  Procedure: FISTULECTOMY, RECTUM PLACEMENT, SETON STITCH POSSIBLE     RECORDS REQUESTED FROM:       [facility]    Pertinent Medical History: nusrat    Most recent EKG+ [id number]    Most recent ECHO: 21    Most recent Cardiac Stress Test: 21

## 2024-02-09 ENCOUNTER — PREP FOR PROCEDURE (OUTPATIENT)
Dept: SURGERY | Facility: CLINIC | Age: 49
End: 2024-02-09

## 2024-02-09 ENCOUNTER — TELEPHONE (OUTPATIENT)
Dept: SURGERY | Facility: CLINIC | Age: 49
End: 2024-02-09

## 2024-02-09 ENCOUNTER — OFFICE VISIT (OUTPATIENT)
Dept: SURGERY | Facility: CLINIC | Age: 49
End: 2024-02-09
Payer: COMMERCIAL

## 2024-02-09 ENCOUNTER — ANESTHESIA EVENT (OUTPATIENT)
Dept: SURGERY | Facility: CLINIC | Age: 49
End: 2024-02-09
Payer: COMMERCIAL

## 2024-02-09 ENCOUNTER — LAB (OUTPATIENT)
Dept: LAB | Facility: CLINIC | Age: 49
End: 2024-02-09
Payer: COMMERCIAL

## 2024-02-09 ENCOUNTER — OFFICE VISIT (OUTPATIENT)
Dept: FAMILY MEDICINE | Facility: CLINIC | Age: 49
End: 2024-02-09
Payer: COMMERCIAL

## 2024-02-09 VITALS
TEMPERATURE: 99.3 F | OXYGEN SATURATION: 97 % | DIASTOLIC BLOOD PRESSURE: 66 MMHG | SYSTOLIC BLOOD PRESSURE: 125 MMHG | HEART RATE: 88 BPM

## 2024-02-09 VITALS
BODY MASS INDEX: 43.91 KG/M2 | DIASTOLIC BLOOD PRESSURE: 65 MMHG | OXYGEN SATURATION: 96 % | WEIGHT: 273.2 LBS | SYSTOLIC BLOOD PRESSURE: 120 MMHG | HEART RATE: 81 BPM | HEIGHT: 66 IN

## 2024-02-09 DIAGNOSIS — Z01.818 PREOP GENERAL PHYSICAL EXAM: Primary | ICD-10-CM

## 2024-02-09 DIAGNOSIS — K61.0 PERIANAL ABSCESS: Primary | ICD-10-CM

## 2024-02-09 DIAGNOSIS — K61.0 PERIANAL ABSCESS: ICD-10-CM

## 2024-02-09 LAB
ERYTHROCYTE [DISTWIDTH] IN BLOOD BY AUTOMATED COUNT: 13 % (ref 10–15)
HCT VFR BLD AUTO: 42 % (ref 35–47)
HGB BLD-MCNC: 14 G/DL (ref 11.7–15.7)
MCH RBC QN AUTO: 30.4 PG (ref 26.5–33)
MCHC RBC AUTO-ENTMCNC: 33.3 G/DL (ref 31.5–36.5)
MCV RBC AUTO: 91 FL (ref 78–100)
PLATELET # BLD AUTO: 265 10E3/UL (ref 150–450)
RBC # BLD AUTO: 4.61 10E6/UL (ref 3.8–5.2)
WBC # BLD AUTO: 8.7 10E3/UL (ref 4–11)

## 2024-02-09 PROCEDURE — 46600 DIAGNOSTIC ANOSCOPY SPX: CPT | Performed by: NURSE PRACTITIONER

## 2024-02-09 PROCEDURE — 99214 OFFICE O/P EST MOD 30 MIN: CPT | Performed by: FAMILY MEDICINE

## 2024-02-09 PROCEDURE — 85027 COMPLETE CBC AUTOMATED: CPT | Performed by: PATHOLOGY

## 2024-02-09 PROCEDURE — 99213 OFFICE O/P EST LOW 20 MIN: CPT | Mod: 25 | Performed by: NURSE PRACTITIONER

## 2024-02-09 PROCEDURE — 36415 COLL VENOUS BLD VENIPUNCTURE: CPT | Performed by: PATHOLOGY

## 2024-02-09 ASSESSMENT — PAIN SCALES - GENERAL: PAINLEVEL: MODERATE PAIN (4)

## 2024-02-09 NOTE — PROGRESS NOTES
Preoperative Evaluation  [facility] [address]  4TH FLOOR  [facility] [address]  Phone: [phone]  Fax: [phone]  Primary Provider: Nelli Nye  Pre-op Performing Provider: MARILYN BRADFORD  Feb 9, 2024     Leandra is a 49 year old, presenting for the following:  Pre-Op Exam        2/9/2024     1:26 PM   Additional Questions   Roomed by Klever Chou   Surgeon: Carmen Magana MD   Fax number for Preop Evaluation:   Location of Surgery: [facility]  Date of Surgery: 2/12/2024  Procedure: FISTULECTOMY, RECTUM, PLACEMENT, SETON STITCH POSSIBLE   History of reaction to anesthesia? No  Surgical Information    Where patient plans to recover: At home with family  Fax number for surgical facility: Note does not need to be faxed, will be available electronically in Epic.    Assessment & Plan     The proposed surgical procedure is considered LOW risk.    Preop general physical exam  Cleared for surgery            - No identified additional risk factors other than previously addressed    Antiplatelet or Anticoagulation Medication Instructions   - Patient is on no antiplatelet or anticoagulation medications.    Additional Medication Instructions  Has held mobic for a week continue to hold    Recommendation  APPROVAL GIVEN to proceed with proposed procedure, without further diagnostic evaluation.      35 minutes spent by me on the date of the encounter doing chart review, history and exam, documentation and further activities per the note    Subjective       HPI related to upcoming procedure: seven years of rectal fistula sympotms, pain and bleeding. Has failed non surgical therapies. Has helpd mobic already for a week and normal cbc today.         2/9/2024    10:48 AM   Preop Questions   1. Have you ever had a heart attack or stroke? No   2. Have you ever had surgery on your heart or blood vessels, such as a stent placement, a coronary artery bypass,  or surgery on an artery in your head, neck, heart, or legs? No   3. Do you have chest pain with activity? No   4. Do you have a history of  heart failure? No   5. Do you currently have a cold, bronchitis or symptoms of other infection? Possible rectal infection   6. Do you have a cough, shortness of breath, or wheezing? No   7. Do you or anyone in your family have previous history of blood clots? No   8. Do you or does anyone in your family have a serious bleeding problem such as prolonged bleeding following surgeries or cuts? No   9. Have you ever had problems with anemia or been told to take iron pills? No   10. Have you had any abnormal blood loss such as black, tarry or bloody stools, or abnormal vaginal bleeding? No   11. Have you ever had a blood transfusion? No   12. Are you willing to have a blood transfusion if it is medically needed before, during, or after your surgery? Yes   13. Have you or any of your relatives ever had problems with anesthesia? No   14. Do you have sleep apnea, excessive snoring or daytime drowsiness? YES - has cpap   14a. Do you have a CPAP machine? Yes   15. Do you have any artifical heart valves or other implanted medical devices like a pacemaker, defibrillator, or continuous glucose monitor? No   16. Do you have artificial joints? No   17. Are you allergic to latex? No   18. Is there any chance that you may be pregnant? No           Preoperative Review of    reviewed - controlled substances reflected in medication list.          Patient Active Problem List    Diagnosis Date Noted    Anal fistula 01/18/2024     Priority: Medium    SERGE (obstructive sleep apnea) 08/05/2021     Priority: Medium     PSG [facility] 1/13/2011  Wt 253 BMI 39.7  AHI 21 RDI 48.8 Lowest O2 sat 72 %        TMJ disease 08/05/2021     Priority: Medium    Seasonal affective disorder (H24) 11/22/2019     Priority: Medium    Tobacco dependence in remission 10/21/2019     Priority: Medium    Specific phobia  10/21/2019     Priority: Medium     Flying      PTSD (post-traumatic stress disorder) 10/21/2019     Priority: Medium    Moderate major depression (H) 09/26/2019     Priority: Medium    BMI of 40.0-44.9, adult (H) 04/04/2017     Priority: Medium    Migraine headache 10/21/2011     Priority: Medium    Obesity 10/21/2011     Priority: Medium    CARDIOVASCULAR SCREENING; LDL GOAL LESS THAN 160 10/31/2010     Priority: Medium    Insomnia 07/08/2010     Priority: Medium    Vitamin D deficiency 04/09/2009     Priority: Medium    Overdose, drug 03/07/2009     Priority: Medium    PCOS (polycystic ovarian syndrome) 02/20/2009     Priority: Medium      Past Medical History:   Diagnosis Date    Conductive hearing loss 01/30/2020    Not sure which kind of hearing loss    Depressive disorder     Hoarseness 12/15/2019    Not all the time    LSIL (low grade squamous intraepithelial lesion) on Pap smear 2008    colp neg    Migraines     Mild major depression (H24) 07/31/2018    Obstructive sleep apnea 01/2011    moderate    PCOS (polycystic ovarian syndrome)     Pneumonia     Sensorineural hearing loss 01/30/2020    Not sure which kind of hearing loss     Past Surgical History:   Procedure Laterality Date    COLONOSCOPY N/A 01/08/2024    Procedure: Colonoscopy;  Surgeon: Himanshu Pop MD;  Location:  GI    COLONOSCOPY      NO HISTORY OF SURGERY       Current Outpatient Medications   Medication Sig Dispense Refill    Acetaminophen (TYLENOL PO) Take by mouth every 4 hours as needed for mild pain or fever Reported on 4/4/2017      albuterol (PROAIR HFA/PROVENTIL HFA/VENTOLIN HFA) 108 (90 BASE) MCG/ACT Inhaler Inhale 2 puffs into the lungs every 6 hours      ALPRAZolam (XANAX) 0.5 MG tablet Take 1-2 tablets 45 minutes prior to flight 20 tablet 0    butalbital-aspirin-caffeine (FIORINAL) -40 MG capsule TAKE 1 CAPSULE BY MOUTH EVERY 4 HOURS AS NEEDED FOR PAIN 20 capsule 0    meloxicam (MOBIC) 7.5 MG tablet Take 1 tablet  "(7.5 mg) by mouth daily 30 tablet 12    nifedipine 0.2% in white petrolatum 0.2 % OINT ointment Apply topically 2 times daily 100 g 1    zolpidem (AMBIEN) 10 MG tablet TAKE 1 TABLET BY MOUTH EVERY NIGHT AS NEEDED FOR SLEEP 30 tablet 5       Allergies   Allergen Reactions    Penicillin [Penicillins]     Trazodone      Other reaction(s): Headache  Pt reports after taking trazodone she had a migraine requiring IM medication    Shellfish-Derived Products Rash        Social History     Tobacco Use    Smoking status: Former     Packs/day: 0.50     Years: 13.00     Additional pack years: 0.00     Total pack years: 6.50     Types: Cigarettes, Clove cigarettes or kreteks     Start date: 1999     Quit date: 2012     Years since quittin.0    Smokeless tobacco: Never   Substance Use Topics    Alcohol use: Yes     Comment: rarely     Family History   Problem Relation Age of Onset    Prostate Cancer Father     Snoring Father     Asthma Mother     Chronic Obstructive Pulmonary Disease Mother     Depression Mother     Migraines Sister     Depression Sister     Anxiety Disorder Sister     Substance Abuse Brother     Depression Brother     Hypertension Maternal Grandmother     Cerebrovascular Disease Maternal Grandmother         Several mini strokes    Dementia Maternal Grandmother     Cerebrovascular Disease Other         Massive stroke    Other Cancer Cousin         Brain cancer    Other Cancer Paternal Grandmother         Brain cancer    Depression Niece     Anxiety Disorder Niece     C.A.D. No family hx of     Diabetes No family hx of     Breast Cancer No family hx of     Cancer - colorectal No family hx of      History   Drug Use No         Review of Systems    Objective    /65 (BP Location: Right arm, Patient Position: Sitting, Cuff Size: Adult Large)   Pulse 81   Ht 1.676 m (5' 5.98\")   Wt 123.9 kg (273 lb 3.2 oz)   LMP 2023 (Exact Date)   SpO2 96%   BMI 44.12 kg/m     Estimated body mass index " "is 44.12 kg/m  as calculated from the following:    Height as of this encounter: 1.676 m (5' 5.98\").    Weight as of this encounter: 123.9 kg (273 lb 3.2 oz).  Physical Exam  GENERAL: alert and no distress  EYES: Eyes grossly normal to inspection, PERRL and conjunctivae and sclerae normal  HENT: ear canals and TM's normal, nose and mouth without ulcers or lesions  NECK: no adenopathy, no asymmetry, masses, or scars  RESP: lungs clear to auscultation - no rales, rhonchi or wheezes  CV: regular rate and rhythm, normal S1 S2, no S3 or S4, no murmur, click or rub, no peripheral edema  ABDOMEN: soft, nontender, no hepatosplenomegaly, no masses and bowel sounds normal  MS: no gross musculoskeletal defects noted, no edema  SKIN: no suspicious lesions or rashes  NEURO: Normal strength and tone, mentation intact and speech normal  PSYCH: mentation appears normal, affect normal/bright    Recent Labs   Lab Test 02/09/24  1003 07/11/23  1522 07/25/22  1644   HGB 14.0  --  13.9     --  255   NA  --  140 140   POTASSIUM  --  4.8 4.4   CR  --  0.78 0.65        Diagnostics  See today cbc wnl    Revised Cardiac Risk Index (RCRI)  The patient has the following serious cardiovascular risks for perioperative complications:   - No serious cardiac risks = 0 points     RCRI Interpretation: 0 points: Class I (very low risk - 0.4% complication rate)         Signed Electronically by: Kelvin Meyer MD  Copy of this evaluation report is provided to requesting physician.         "

## 2024-02-09 NOTE — PATIENT INSTRUCTIONS
Preparing for Your Surgery  Getting started  A nurse will call you to review your health history and instructions. They will give you an arrival time based on your scheduled surgery time. Please be ready to share:  Your doctor's clinic name and phone number  Your medical, surgical, and anesthesia history  A list of allergies and sensitivities  A list of medicines, including herbal treatments and over-the-counter drugs  Whether the patient has a legal guardian (ask how to send us the papers in advance)  Please tell us if you're pregnant--or if there's any chance you might be pregnant. Some surgeries may injure a fetus (unborn baby), so they require a pregnancy test. Surgeries that are safe for a fetus don't always need a test, and you can choose whether to have one.   If you have a child who's having surgery, please ask for a copy of Preparing for Your Child's Surgery.    Preparing for surgery  Within 10 to 30 days of surgery: Have a pre-op exam (sometimes called an H&P, or History and Physical). This can be done at a clinic or pre-operative center.  If you're having a , you may not need this exam. Talk to your care team.  At your pre-op exam, talk to your care team about all medicines you take. If you need to stop any medicines before surgery, ask when to start taking them again.  We do this for your safety. Many medicines can make you bleed too much during surgery. Some change how well surgery (anesthesia) drugs work.  Call your insurance company to let them know you're having surgery. (If you don't have insurance, call [phone].)  Call your clinic if there's any change in your health. This includes signs of a cold or flu (sore throat, runny nose, cough, rash, fever). It also includes a scrape or scratch near the surgery site.  If you have questions on the day of surgery, call your hospital or surgery center.  Eating and drinking guidelines  For your safety: Unless your surgeon tells you otherwise,  follow the guidelines below.  Eat and drink as usual until 8 hours before you arrive for surgery. After that, no food or milk.  Drink clear liquids until 2 hours before you arrive. These are liquids you can see through, like water, Gatorade, and Propel Water. They also include plain black coffee and tea (no cream or milk), candy, and breath mints. You can spit out gum when you arrive.  If you drink alcohol: Stop drinking it the night before surgery.  If your care team tells you to take medicine on the morning of surgery, it's okay to take it with a sip of water.  Preventing infection  Shower or bathe the night before and morning of your surgery. Follow the instructions your clinic gave you. (If no instructions, use regular soap.)  Don't shave or clip hair near your surgery site. We'll remove the hair if needed.  Don't smoke or vape the morning of surgery. You may chew nicotine gum up to 2 hours before surgery. A nicotine patch is okay.  Note: Some surgeries require you to completely quit smoking and nicotine. Check with your surgeon.  Your care team will make every effort to keep you safe from infection. We will:  Clean our hands often with soap and water (or an alcohol-based hand rub).  Clean the skin at your surgery site with a special soap that kills germs.  Give you a special gown to keep you warm. (Cold raises the risk of infection.)  Wear special hair covers, masks, gowns and gloves during surgery.  Give antibiotic medicine, if prescribed. Not all surgeries need antibiotics.  What to bring on the day of surgery  Photo ID and insurance card  Copy of your health care directive, if you have one  Glasses and hearing aids (bring cases)  You can't wear contacts during surgery  Inhaler and eye drops, if you use them (tell us about these when you arrive)  CPAP machine or breathing device, if you use them  A few personal items, if spending the night  If you have . . .  A pacemaker, ICD (cardiac defibrillator) or other  implant: Bring the ID card.  An implanted stimulator: Bring the remote control.  A legal guardian: Bring a copy of the certified (court-stamped) guardianship papers.  Please remove any jewelry, including body piercings. Leave jewelry and other valuables at home.  If you're going home the day of surgery  You must have a responsible adult drive you home. They should stay with you overnight as well.  If you don't have someone to stay with you, and you aren't safe to go home alone, we may keep you overnight. Insurance often won't pay for this.  After surgery  If it's hard to control your pain or you need more pain medicine, please call your surgeon's office.  Questions?   If you have any questions for your care team, list them here: _________________________________________________________________________________________________________________________________________________________________________ ____________________________________ ____________________________________ ____________________________________  For informational purposes only. Not to replace the advice of your health care provider. Copyright   2003, 2019 [facility]. All rights reserved. Clinically reviewed by Sade Mae MD. [employer] 343046 - REV 12/22.    How to Take Your Medication Before Surgery  - Take all of your medications before surgery as usual

## 2024-02-09 NOTE — PROGRESS NOTES
Leandra is a 49 year old that presents in clinic today for the following:     Chief Complaint   Patient presents with    Pre-Op Exam           2/9/2024     1:26 PM   Additional Questions   Roomed by Klever Chou     Screenings from encounters over the past 10 days    No data recorded     Surgeon: Carmen Magana MD   Fax number for Preop Evaluation:   Location of Surgery: [facility]  Date of Surgery: 2/12/2024  Procedure: FISTULECTOMY, RECTUM, PLACEMENT, SETON STITCH POSSIBLE   History of reaction to anesthesia? No    Klever Chou, Visit Facilitator at 1:28 PM on 2/9/2024.

## 2024-02-09 NOTE — LETTER
2024       RE: Leandra Chris   [facility] [address]       Dear Colleague,    Thank you for referring your patient, Leandra Chris, to the [facility] [place] [facility]. Please see a copy of my visit note below.    Colon and Rectal Surgery Follow-Up Clinic Note    RE: Leandra Chris  : 1975  CIRILO: 2024    Leandra Chris is a very pleasant 49 year old female here for follow-up of anal abscess/fistula.    Interval history: I saw Leandra in January with a right anterior perianal abscess that was adequately draining through an internal opening in the anterior anal canal. She had had symptoms with this for several years and we discussed possible fistulotomy/possible seton but she wanted to wait until she had a break from school for this and is scheduled 3/11. I saw her previously in  with concern for anal fistula but MRI without fistula present. She saw Dr. Wadsworth in  with anterior anal fissure but no abscess.  She has had increased pain in the past week and some chills. Unsure if she has a fever. The pain was fairly severe and she has a site at the anal opening that is tender. Has continued drainage and has not noticed a difference in the amount. No difficulty with bowel movements.  Colonoscopy 24 with three tubular adenomas.    No anorectal surgeries in the past. No obstetric history. No difficulty holding bowel movements.    Physical Examination: Exam was chaperoned by Milton Schmidt, EMT-P   /66 (BP Location: Right arm, Patient Position: Sitting, Cuff Size: Adult Large)   Pulse 88   Temp 99.3  F (37.4  C)   LMP 2023 (Exact Date)   SpO2 97%   General: alert, oriented, in no acute distress, sitting comfortably  HEENT:mucous membranes moist    Perianal external examination:  Some induration without erythema or tenderness in the right anterior position. Opening in the anterior  anal verge that is tender on palpation.     Digital rectal examination: Was performed.   Sphincter tone: Good.  Palpable lesions: Palpable ulceration in the anterior anal verge    Anoscopy: Was performed.   Small opening in the anterior position at the anal verge with purulent drainage present. I was able to easily insert a fistula probe at this site with additional purulent drainage resulting.    Assessment/Plan: 49 year old female with with recurrent perianal abscess. This again appears to be adequately draining. Will check WBC today given chills and if elevated will give antibiotics. I recommended EUA sooner given her recurrent symptoms and she is agreeable to this. We discussed possibly just making the internal opening larger but also possible fistulotomy or seton. Since potential fissure/fistula complex, will also book for possible Botox injection. Patient's questions were answered to her stated satisfaction and she is in agreement with this plan.     Medical history:  Past Medical History:   Diagnosis Date    Conductive hearing loss 01/30/2020    Not sure which kind of hearing loss    Depressive disorder     Hoarseness 12/15/2019    Not all the time    LSIL (low grade squamous intraepithelial lesion) on Pap smear 2008    colp neg    Migraines     Mild major depression (H24) 07/31/2018    Obstructive sleep apnea 01/2011    moderate    PCOS (polycystic ovarian syndrome)     Pneumonia     Sensorineural hearing loss 01/30/2020    Not sure which kind of hearing loss       Surgical history:  Past Surgical History:   Procedure Laterality Date    COLONOSCOPY N/A 1/8/2024    Procedure: Colonoscopy;  Surgeon: Himanshu Pop MD;  Location: [facility]    NO HISTORY OF SURGERY         Problem list:    Patient Active Problem List    Diagnosis Date Noted    Anal fistula 01/18/2024     Priority: Medium    SERGE (obstructive sleep apnea) 08/05/2021     Priority: Medium     PSG [facility] 1/13/2011  Wt 253 BMI 39.7  AHI 21 RDI 48.8  Lowest O2 sat 72 %        TMJ disease 08/05/2021     Priority: Medium    Seasonal affective disorder (H24) 11/22/2019     Priority: Medium    Tobacco dependence in remission 10/21/2019     Priority: Medium    Specific phobia 10/21/2019     Priority: Medium     Flying      PTSD (post-traumatic stress disorder) 10/21/2019     Priority: Medium    Moderate major depression (H) 09/26/2019     Priority: Medium    BMI of 40.0-44.9, adult (H) 04/04/2017     Priority: Medium    Migraine headache 10/21/2011     Priority: Medium    Obesity 10/21/2011     Priority: Medium    CARDIOVASCULAR SCREENING; LDL GOAL LESS THAN 160 10/31/2010     Priority: Medium    Insomnia 07/08/2010     Priority: Medium    Vitamin D deficiency 04/09/2009     Priority: Medium    Overdose, drug 03/07/2009     Priority: Medium    PCOS (polycystic ovarian syndrome) 02/20/2009     Priority: Medium       Medications:  Current Outpatient Medications   Medication Sig Dispense Refill    Acetaminophen (TYLENOL PO) Take by mouth every 4 hours as needed for mild pain or fever Reported on 4/4/2017      albuterol (PROAIR HFA/PROVENTIL HFA/VENTOLIN HFA) 108 (90 BASE) MCG/ACT Inhaler Inhale 2 puffs into the lungs every 6 hours      ALPRAZolam (XANAX) 0.5 MG tablet Take 1-2 tablets 45 minutes prior to flight 20 tablet 0    butalbital-aspirin-caffeine (FIORINAL) -40 MG capsule TAKE 1 CAPSULE BY MOUTH EVERY 4 HOURS AS NEEDED FOR PAIN 20 capsule 0    meloxicam (MOBIC) 7.5 MG tablet Take 1 tablet (7.5 mg) by mouth daily 30 tablet 12    nifedipine 0.2% in white petrolatum 0.2 % OINT ointment Apply topically 2 times daily 100 g 1    zolpidem (AMBIEN) 10 MG tablet TAKE 1 TABLET BY MOUTH EVERY NIGHT AS NEEDED FOR SLEEP 30 tablet 5       Allergies:  Allergies   Allergen Reactions    Penicillin [Penicillins]     Trazodone      Other reaction(s): Headache  Pt reports after taking trazodone she had a migraine requiring IM medication    Shellfish-Derived Products Rash        Family history:  Family History   Problem Relation Age of Onset    Prostate Cancer Father     Snoring Father     Asthma Mother     Chronic Obstructive Pulmonary Disease Mother     Depression Mother     Migraines Sister     Depression Sister     Anxiety Disorder Sister     Substance Abuse Brother     Depression Brother     Hypertension Maternal Grandmother     Cerebrovascular Disease Maternal Grandmother         Several mini strokes    Dementia Maternal Grandmother     Cerebrovascular Disease Other         Massive stroke    Other Cancer Cousin         Brain cancer    Other Cancer Paternal Grandmother         Brain cancer    Depression Niece     Anxiety Disorder Niece     C.A.D. No family hx of     Diabetes No family hx of     Breast Cancer No family hx of     Cancer - colorectal No family hx of        Social history:  Social History     Tobacco Use    Smoking status: Former     Packs/day: 0.50     Years: 13.00     Additional pack years: 0.00     Total pack years: 6.50     Types: Cigarettes, Clove cigarettes or kreteks     Start date: 1999     Quit date: 2012     Years since quittin.0    Smokeless tobacco: Never   Substance Use Topics    Alcohol use: Yes     Comment: rarely     Marital status: .    Nursing Notes:   Milton Schmidt, EMT  2024  9:24 AM  Signed  Chief Complaint   Patient presents with    Consult       Vitals:    24 0921   BP: 125/66   BP Location: Right arm   Patient Position: Sitting   Cuff Size: Adult Large   Pulse: 88   Temp: 99.3  F (37.4  C)   SpO2: 97%       There is no height or weight on file to calculate BMI.    Milton Schmidt EMT-P    20 minutes spent on the date of encounter performing chart review, history and exam, documentation and further activities as noted above with an additional 2 minutes for anoscopy.       Again, thank you for allowing me to participate in the care of your patient.      Sincerely,    Maday Murphy, EDISON CNP

## 2024-02-09 NOTE — PROGRESS NOTES
Colon and Rectal Surgery Follow-Up Clinic Note    RE: Leandra Chris  : 1975  CIRILO: 2024    Leandra Chris is a very pleasant 49 year old female here for follow-up of anal abscess/fistula.    Interval history: I saw Leandra in January with a right anterior perianal abscess that was adequately draining through an internal opening in the anterior anal canal. She had had symptoms with this for several years and we discussed possible fistulotomy/possible seton but she wanted to wait until she had a break from school for this and is scheduled 3/11. I saw her previously in  with concern for anal fistula but MRI without fistula present. She saw Dr. Wadsworth in  with anterior anal fissure but no abscess.  She has had increased pain in the past week and some chills. Unsure if she has a fever. The pain was fairly severe and she has a site at the anal opening that is tender. Has continued drainage and has not noticed a difference in the amount. No difficulty with bowel movements.  Colonoscopy 24 with three tubular adenomas.    No anorectal surgeries in the past. No obstetric history. No difficulty holding bowel movements.    Physical Examination: Exam was chaperoned by Milton Schmidt, EMT-P   /66 (BP Location: Right arm, Patient Position: Sitting, Cuff Size: Adult Large)   Pulse 88   Temp 99.3  F (37.4  C)   LMP 2023 (Exact Date)   SpO2 97%   General: alert, oriented, in no acute distress, sitting comfortably  HEENT:mucous membranes moist    Perianal external examination:  Some induration without erythema or tenderness in the right anterior position. Opening in the anterior anal verge that is tender on palpation.     Digital rectal examination: Was performed.   Sphincter tone: Good.  Palpable lesions: Palpable ulceration in the anterior anal verge    Anoscopy: Was performed.   Small opening in the anterior position at the anal verge with purulent drainage present. I was able to easily insert  a fistula probe at this site with additional purulent drainage resulting.    Assessment/Plan: 49 year old female with with recurrent perianal abscess. This again appears to be adequately draining. Will check WBC today given chills and if elevated will give antibiotics. I recommended EUA sooner given her recurrent symptoms and she is agreeable to this. We discussed possibly just making the internal opening larger but also possible fistulotomy or seton. Patient's questions were answered to her stated satisfaction and she is in agreement with this plan.     Medical history:  Past Medical History:   Diagnosis Date    Conductive hearing loss 01/30/2020    Not sure which kind of hearing loss    Depressive disorder     Hoarseness 12/15/2019    Not all the time    LSIL (low grade squamous intraepithelial lesion) on Pap smear 2008    colp neg    Migraines     Mild major depression (H24) 07/31/2018    Obstructive sleep apnea 01/2011    moderate    PCOS (polycystic ovarian syndrome)     Pneumonia     Sensorineural hearing loss 01/30/2020    Not sure which kind of hearing loss       Surgical history:  Past Surgical History:   Procedure Laterality Date    COLONOSCOPY N/A 1/8/2024    Procedure: Colonoscopy;  Surgeon: Himanshu Pop MD;  Location: [facility]    NO HISTORY OF SURGERY         Problem list:    Patient Active Problem List    Diagnosis Date Noted    Anal fistula 01/18/2024     Priority: Medium    SERGE (obstructive sleep apnea) 08/05/2021     Priority: Medium     PSG [facility] 1/13/2011  Wt 253 BMI 39.7  AHI 21 RDI 48.8 Lowest O2 sat 72 %        TMJ disease 08/05/2021     Priority: Medium    Seasonal affective disorder (H24) 11/22/2019     Priority: Medium    Tobacco dependence in remission 10/21/2019     Priority: Medium    Specific phobia 10/21/2019     Priority: Medium     Flying      PTSD (post-traumatic stress disorder) 10/21/2019     Priority: Medium    Moderate major depression (H) 09/26/2019     Priority: Medium     BMI of 40.0-44.9, adult (H) 04/04/2017     Priority: Medium    Migraine headache 10/21/2011     Priority: Medium    Obesity 10/21/2011     Priority: Medium    CARDIOVASCULAR SCREENING; LDL GOAL LESS THAN 160 10/31/2010     Priority: Medium    Insomnia 07/08/2010     Priority: Medium    Vitamin D deficiency 04/09/2009     Priority: Medium    Overdose, drug 03/07/2009     Priority: Medium    PCOS (polycystic ovarian syndrome) 02/20/2009     Priority: Medium       Medications:  Current Outpatient Medications   Medication Sig Dispense Refill    Acetaminophen (TYLENOL PO) Take by mouth every 4 hours as needed for mild pain or fever Reported on 4/4/2017      albuterol (PROAIR HFA/PROVENTIL HFA/VENTOLIN HFA) 108 (90 BASE) MCG/ACT Inhaler Inhale 2 puffs into the lungs every 6 hours      ALPRAZolam (XANAX) 0.5 MG tablet Take 1-2 tablets 45 minutes prior to flight 20 tablet 0    butalbital-aspirin-caffeine (FIORINAL) -40 MG capsule TAKE 1 CAPSULE BY MOUTH EVERY 4 HOURS AS NEEDED FOR PAIN 20 capsule 0    meloxicam (MOBIC) 7.5 MG tablet Take 1 tablet (7.5 mg) by mouth daily 30 tablet 12    nifedipine 0.2% in white petrolatum 0.2 % OINT ointment Apply topically 2 times daily 100 g 1    zolpidem (AMBIEN) 10 MG tablet TAKE 1 TABLET BY MOUTH EVERY NIGHT AS NEEDED FOR SLEEP 30 tablet 5       Allergies:  Allergies   Allergen Reactions    Penicillin [Penicillins]     Trazodone      Other reaction(s): Headache  Pt reports after taking trazodone she had a migraine requiring IM medication    Shellfish-Derived Products Rash       Family history:  Family History   Problem Relation Age of Onset    Prostate Cancer Father     Snoring Father     Asthma Mother     Chronic Obstructive Pulmonary Disease Mother     Depression Mother     Migraines Sister     Depression Sister     Anxiety Disorder Sister     Substance Abuse Brother     Depression Brother     Hypertension Maternal Grandmother     Cerebrovascular Disease Maternal Grandmother          Several mini strokes    Dementia Maternal Grandmother     Cerebrovascular Disease Other         Massive stroke    Other Cancer Cousin         Brain cancer    Other Cancer Paternal Grandmother         Brain cancer    Depression Niece     Anxiety Disorder Niece     C.A.D. No family hx of     Diabetes No family hx of     Breast Cancer No family hx of     Cancer - colorectal No family hx of        Social history:  Social History     Tobacco Use    Smoking status: Former     Packs/day: 0.50     Years: 13.00     Additional pack years: 0.00     Total pack years: 6.50     Types: Cigarettes, Clove cigarettes or kreteks     Start date: 1999     Quit date: 2012     Years since quittin.0    Smokeless tobacco: Never   Substance Use Topics    Alcohol use: Yes     Comment: rarely     Marital status: .    Nursing Notes:   Milton Schmidt, EMT  2024  9:24 AM  Signed  Chief Complaint   Patient presents with    Consult       Vitals:    24 0921   BP: 125/66   BP Location: Right arm   Patient Position: Sitting   Cuff Size: Adult Large   Pulse: 88   Temp: 99.3  F (37.4  C)   SpO2: 97%       There is no height or weight on file to calculate BMI.    Milton Schmidt EMT-P      20 minutes spent on the date of encounter performing chart review, history and exam, documentation and further activities as noted above with an additional 2 minutes for anoscopy.     Maday Riley NP-C  Colon and Rectal Surgery  [facility]

## 2024-02-09 NOTE — NURSING NOTE
Chief Complaint   Patient presents with    Consult       Vitals:    02/09/24 0921   BP: 125/66   BP Location: Right arm   Patient Position: Sitting   Cuff Size: Adult Large   Pulse: 88   Temp: 99.3  F (37.4  C)   SpO2: 97%       There is no height or weight on file to calculate BMI.    Milton Schmidt EMT-P

## 2024-02-09 NOTE — TELEPHONE ENCOUNTER
On 2/9/2024:  Communicated with Maday Riley NP, patient is symptomatic, so they are trying to move patient's outpatient surgery up to next week if possible.     On 2/9/2024 at 9:50 AM:  Spoke with patient, she says that Endoscopy Scheduling screened her as ineligible to have her Colonoscopy at the [facility] d/t medical criteria. I told her that I'd check on this. Patient is aware that we are trying to schedule her surgery on Monday 2/12/2024. Patient scheduled for a Pre-op H&P with Dr. Meyer at [facility] at 1:30 PM today.    Checked with Sindi at the [facility] OR Control Desk, she confirmed that patient's surgery should be scheduled at a Hospital Level OR.    On 2/9/2024 at 10:58 AM:    Patient is scheduled for surgery with Dr. Carmen Magana    Spoke with: Leandra    Date of Surgery: Mon 2/12/2024    Location: [facility]    Informed patient they will need an adult  YES    Pre op with Provider Dr. Magana and Maday Riley NP     Upcoming / Related Appointments:     Feb 09, 2024 10:00 AM  LAB with [facility]  [facility] [place] ([facility] [place] ) [phone]    [address] [address]      Feb 09, 2024  1:30 PM  (Arrive by 1:15 PM)  PRE-OP with Kelvin Meyer MD  [facility] [place]   ([facility] [place] ) [phone]    [address] [place] [id number]      Feb 23, 2024  3:00 PM  (Arrive by 2:45 PM)  Post-op appointment with Martha Brooks PA-C   [facility] Rectal [facility] [place] [phone]    [address] [place] [address]      Surgery packet: sending via [employer]     Additional comments:   - DOS 2/12/2024 per Dr. Magana and RITA Shaikh  Lee Ann-op Coordinator  [place]  Direct Phone: [phone]

## 2024-02-12 ENCOUNTER — HOSPITAL ENCOUNTER (OUTPATIENT)
Facility: CLINIC | Age: 49
Discharge: HOME | End: 2024-02-12
Attending: COLON & RECTAL SURGERY | Admitting: COLON & RECTAL SURGERY
Payer: COMMERCIAL

## 2024-02-12 ENCOUNTER — ANESTHESIA (OUTPATIENT)
Dept: SURGERY | Facility: CLINIC | Age: 49
End: 2024-02-12
Payer: COMMERCIAL

## 2024-02-12 VITALS
RESPIRATION RATE: 16 BRPM | SYSTOLIC BLOOD PRESSURE: 133 MMHG | HEIGHT: 66 IN | TEMPERATURE: 99.5 F | BODY MASS INDEX: 43.72 KG/M2 | WEIGHT: 272.05 LBS | HEART RATE: 92 BPM | DIASTOLIC BLOOD PRESSURE: 78 MMHG | OXYGEN SATURATION: 94 %

## 2024-02-12 DIAGNOSIS — K60.30 ANAL FISTULA: Primary | ICD-10-CM

## 2024-02-12 PROCEDURE — 250N000013 HC RX MED GY IP 250 OP 250 PS 637: Performed by: STUDENT IN AN ORGANIZED HEALTH CARE EDUCATION/TRAINING PROGRAM

## 2024-02-12 PROCEDURE — 370N000017 HC ANESTHESIA TECHNICAL FEE, PER MIN: Performed by: COLON & RECTAL SURGERY

## 2024-02-12 PROCEDURE — 250N000009 HC RX 250: Performed by: NURSE ANESTHETIST, CERTIFIED REGISTERED

## 2024-02-12 PROCEDURE — 250N000011 HC RX IP 250 OP 636: Performed by: NURSE ANESTHETIST, CERTIFIED REGISTERED

## 2024-02-12 PROCEDURE — 999N000141 HC STATISTIC PRE-PROCEDURE NURSING ASSESSMENT: Performed by: COLON & RECTAL SURGERY

## 2024-02-12 PROCEDURE — 46275 REMOVE ANAL FIST INTER: CPT | Mod: GC | Performed by: COLON & RECTAL SURGERY

## 2024-02-12 PROCEDURE — 250N000009 HC RX 250: Performed by: COLON & RECTAL SURGERY

## 2024-02-12 PROCEDURE — 272N000001 HC OR GENERAL SUPPLY STERILE: Performed by: COLON & RECTAL SURGERY

## 2024-02-12 PROCEDURE — 250N000011 HC RX IP 250 OP 636: Performed by: STUDENT IN AN ORGANIZED HEALTH CARE EDUCATION/TRAINING PROGRAM

## 2024-02-12 PROCEDURE — 258N000003 HC RX IP 258 OP 636: Performed by: NURSE ANESTHETIST, CERTIFIED REGISTERED

## 2024-02-12 PROCEDURE — 360N000075 HC SURGERY LEVEL 2, PER MIN: Performed by: COLON & RECTAL SURGERY

## 2024-02-12 PROCEDURE — 710N000012 HC RECOVERY PHASE 2, PER MINUTE: Performed by: COLON & RECTAL SURGERY

## 2024-02-12 PROCEDURE — 250N000025 HC SEVOFLURANE, PER MIN: Performed by: COLON & RECTAL SURGERY

## 2024-02-12 PROCEDURE — 710N000010 HC RECOVERY PHASE 1, LEVEL 2, PER MIN: Performed by: COLON & RECTAL SURGERY

## 2024-02-12 RX ORDER — ONDANSETRON 4 MG/1
4 TABLET, ORALLY DISINTEGRATING ORAL EVERY 30 MIN PRN
Status: DISCONTINUED | OUTPATIENT
Start: 2024-02-12 | End: 2024-02-12 | Stop reason: HOSPADM

## 2024-02-12 RX ORDER — ONDANSETRON 2 MG/ML
INJECTION INTRAMUSCULAR; INTRAVENOUS PRN
Status: DISCONTINUED | OUTPATIENT
Start: 2024-02-12 | End: 2024-02-12

## 2024-02-12 RX ORDER — OXYCODONE HYDROCHLORIDE 5 MG/1
5 TABLET ORAL EVERY 4 HOURS PRN
Status: DISCONTINUED | OUTPATIENT
Start: 2024-02-12 | End: 2024-02-12 | Stop reason: HOSPADM

## 2024-02-12 RX ORDER — LABETALOL HYDROCHLORIDE 5 MG/ML
10 INJECTION, SOLUTION INTRAVENOUS
Status: DISCONTINUED | OUTPATIENT
Start: 2024-02-12 | End: 2024-02-12 | Stop reason: HOSPADM

## 2024-02-12 RX ORDER — ONDANSETRON 2 MG/ML
4 INJECTION INTRAMUSCULAR; INTRAVENOUS EVERY 30 MIN PRN
Status: DISCONTINUED | OUTPATIENT
Start: 2024-02-12 | End: 2024-02-12 | Stop reason: HOSPADM

## 2024-02-12 RX ORDER — HALOPERIDOL 5 MG/ML
1 INJECTION INTRAMUSCULAR
Status: DISCONTINUED | OUTPATIENT
Start: 2024-02-12 | End: 2024-02-12 | Stop reason: HOSPADM

## 2024-02-12 RX ORDER — DEXAMETHASONE SODIUM PHOSPHATE 4 MG/ML
INJECTION, SOLUTION INTRA-ARTICULAR; INTRALESIONAL; INTRAMUSCULAR; INTRAVENOUS; SOFT TISSUE PRN
Status: DISCONTINUED | OUTPATIENT
Start: 2024-02-12 | End: 2024-02-12

## 2024-02-12 RX ORDER — OXYCODONE HYDROCHLORIDE 5 MG/1
5-10 TABLET ORAL
Qty: 8 TABLET | Refills: 0 | Status: SHIPPED | OUTPATIENT
Start: 2024-02-12 | End: 2024-02-23

## 2024-02-12 RX ORDER — PROPOFOL 10 MG/ML
INJECTION, EMULSION INTRAVENOUS PRN
Status: DISCONTINUED | OUTPATIENT
Start: 2024-02-12 | End: 2024-02-12

## 2024-02-12 RX ORDER — FENTANYL CITRATE 50 UG/ML
50 INJECTION, SOLUTION INTRAMUSCULAR; INTRAVENOUS EVERY 5 MIN PRN
Status: DISCONTINUED | OUTPATIENT
Start: 2024-02-12 | End: 2024-02-12 | Stop reason: HOSPADM

## 2024-02-12 RX ORDER — SODIUM CHLORIDE, SODIUM LACTATE, POTASSIUM CHLORIDE, CALCIUM CHLORIDE 600; 310; 30; 20 MG/100ML; MG/100ML; MG/100ML; MG/100ML
INJECTION, SOLUTION INTRAVENOUS CONTINUOUS
Status: DISCONTINUED | OUTPATIENT
Start: 2024-02-12 | End: 2024-02-12 | Stop reason: HOSPADM

## 2024-02-12 RX ORDER — LIDOCAINE HYDROCHLORIDE 20 MG/ML
INJECTION, SOLUTION INFILTRATION; PERINEURAL PRN
Status: DISCONTINUED | OUTPATIENT
Start: 2024-02-12 | End: 2024-02-12

## 2024-02-12 RX ORDER — SODIUM CHLORIDE, SODIUM LACTATE, POTASSIUM CHLORIDE, CALCIUM CHLORIDE 600; 310; 30; 20 MG/100ML; MG/100ML; MG/100ML; MG/100ML
INJECTION, SOLUTION INTRAVENOUS CONTINUOUS PRN
Status: DISCONTINUED | OUTPATIENT
Start: 2024-02-12 | End: 2024-02-12

## 2024-02-12 RX ORDER — ONDANSETRON 2 MG/ML
4 INJECTION INTRAMUSCULAR; INTRAVENOUS ONCE
Status: DISCONTINUED | OUTPATIENT
Start: 2024-02-12 | End: 2024-02-12 | Stop reason: HOSPADM

## 2024-02-12 RX ORDER — ACETAMINOPHEN 325 MG/1
975 TABLET ORAL EVERY 6 HOURS PRN
Status: DISCONTINUED | OUTPATIENT
Start: 2024-02-12 | End: 2024-02-12 | Stop reason: HOSPADM

## 2024-02-12 RX ORDER — BUPIVACAINE HYDROCHLORIDE AND EPINEPHRINE 5; 5 MG/ML; UG/ML
INJECTION, SOLUTION EPIDURAL; INTRACAUDAL; PERINEURAL PRN
Status: DISCONTINUED | OUTPATIENT
Start: 2024-02-12 | End: 2024-02-12 | Stop reason: HOSPADM

## 2024-02-12 RX ORDER — HYDROMORPHONE HCL IN WATER/PF 6 MG/30 ML
0.4 PATIENT CONTROLLED ANALGESIA SYRINGE INTRAVENOUS EVERY 5 MIN PRN
Status: DISCONTINUED | OUTPATIENT
Start: 2024-02-12 | End: 2024-02-12 | Stop reason: HOSPADM

## 2024-02-12 RX ORDER — FENTANYL CITRATE 50 UG/ML
INJECTION, SOLUTION INTRAMUSCULAR; INTRAVENOUS PRN
Status: DISCONTINUED | OUTPATIENT
Start: 2024-02-12 | End: 2024-02-12

## 2024-02-12 RX ORDER — FENTANYL CITRATE 50 UG/ML
25 INJECTION, SOLUTION INTRAMUSCULAR; INTRAVENOUS EVERY 5 MIN PRN
Status: DISCONTINUED | OUTPATIENT
Start: 2024-02-12 | End: 2024-02-12 | Stop reason: HOSPADM

## 2024-02-12 RX ORDER — ALBUTEROL SULFATE 0.83 MG/ML
2.5 SOLUTION RESPIRATORY (INHALATION) EVERY 4 HOURS PRN
Status: DISCONTINUED | OUTPATIENT
Start: 2024-02-12 | End: 2024-02-12 | Stop reason: HOSPADM

## 2024-02-12 RX ORDER — HYDRALAZINE HYDROCHLORIDE 20 MG/ML
2.5-5 INJECTION INTRAMUSCULAR; INTRAVENOUS EVERY 10 MIN PRN
Status: DISCONTINUED | OUTPATIENT
Start: 2024-02-12 | End: 2024-02-12 | Stop reason: HOSPADM

## 2024-02-12 RX ORDER — ONDANSETRON 4 MG/1
4 TABLET, ORALLY DISINTEGRATING ORAL
Status: DISCONTINUED | OUTPATIENT
Start: 2024-02-12 | End: 2024-02-12 | Stop reason: HOSPADM

## 2024-02-12 RX ORDER — HYDROMORPHONE HCL IN WATER/PF 6 MG/30 ML
0.2 PATIENT CONTROLLED ANALGESIA SYRINGE INTRAVENOUS EVERY 5 MIN PRN
Status: DISCONTINUED | OUTPATIENT
Start: 2024-02-12 | End: 2024-02-12 | Stop reason: HOSPADM

## 2024-02-12 RX ORDER — LIDOCAINE 40 MG/G
CREAM TOPICAL
Status: DISCONTINUED | OUTPATIENT
Start: 2024-02-12 | End: 2024-02-12 | Stop reason: HOSPADM

## 2024-02-12 RX ADMIN — FENTANYL CITRATE 50 MCG: 50 INJECTION INTRAMUSCULAR; INTRAVENOUS at 07:47

## 2024-02-12 RX ADMIN — FENTANYL CITRATE 50 MCG: 50 INJECTION INTRAMUSCULAR; INTRAVENOUS at 08:09

## 2024-02-12 RX ADMIN — PROPOFOL 200 MG: 10 INJECTION, EMULSION INTRAVENOUS at 07:48

## 2024-02-12 RX ADMIN — Medication 1 LOZENGE: at 09:02

## 2024-02-12 RX ADMIN — SUGAMMADEX 200 MG: 100 INJECTION, SOLUTION INTRAVENOUS at 08:27

## 2024-02-12 RX ADMIN — SODIUM CHLORIDE, POTASSIUM CHLORIDE, SODIUM LACTATE AND CALCIUM CHLORIDE: 600; 310; 30; 20 INJECTION, SOLUTION INTRAVENOUS at 07:45

## 2024-02-12 RX ADMIN — ONDANSETRON 4 MG: 2 INJECTION INTRAMUSCULAR; INTRAVENOUS at 08:55

## 2024-02-12 RX ADMIN — MIDAZOLAM 2 MG: 1 INJECTION INTRAMUSCULAR; INTRAVENOUS at 07:35

## 2024-02-12 RX ADMIN — ONDANSETRON 4 MG: 2 INJECTION INTRAMUSCULAR; INTRAVENOUS at 07:40

## 2024-02-12 RX ADMIN — DEXAMETHASONE SODIUM PHOSPHATE 6 MG: 4 INJECTION, SOLUTION INTRA-ARTICULAR; INTRALESIONAL; INTRAMUSCULAR; INTRAVENOUS; SOFT TISSUE at 08:06

## 2024-02-12 RX ADMIN — LIDOCAINE HYDROCHLORIDE 100 MG: 20 INJECTION, SOLUTION INFILTRATION; PERINEURAL at 07:47

## 2024-02-12 RX ADMIN — Medication 50 MG: at 07:48

## 2024-02-12 RX ADMIN — FENTANYL CITRATE 50 MCG: 50 INJECTION INTRAMUSCULAR; INTRAVENOUS at 08:12

## 2024-02-12 ASSESSMENT — ACTIVITIES OF DAILY LIVING (ADL)
ADLS_ACUITY_SCORE: 29
ADLS_ACUITY_SCORE: 29

## 2024-02-12 NOTE — BRIEF OP NOTE
[facility]    Brief Operative Note    Pre-operative diagnosis: Anal fistula [K60.3]  Post-operative diagnosis Same as pre-operative diagnosis    Procedure: FISTULOTOMY, RECTUM, N/A - Rectum    Surgeon: Surgeon(s) and Role:     * Carmen Magana MD - Primary     * Sultana Barreto PA-C - Assisting     * Rachna Webster MD - Resident - Assisting  Anesthesia: MAC   Estimated Blood Loss: 1 cc    Drains: None  Specimens: * No specimens in log *  Findings:   Fistula cavity entered with probe, fistulotomy performed with cautery. Local anesthesia injected, hemostasis achieved. Dressed with gauze fluffs, abd pad, mesh panties. Extubated in OR and transferred to PACU without event.   Complications: None.  Implants: * No implants in log *    Rachna Webster MD   General Surgery PGY1   [phone]

## 2024-02-12 NOTE — ANESTHESIA PROCEDURE NOTES
Airway       Patient location during procedure: OR       Procedure Start/Stop Times: 2/12/2024 7:51 AM  Staff -        CRNA: Ronit Acevedo APRN CRNA       Performed By: CRNA  Consent for Airway        Urgency: elective  Indications and Patient Condition       Indications for airway management: rusty-procedural       Induction type:intravenous       Mask difficulty assessment: 1 - vent by mask    Final Airway Details       Final airway type: endotracheal airway       Successful airway: ETT - single  Endotracheal Airway Details        ETT size (mm): 7.0       Cuffed: yes       Successful intubation technique: direct laryngoscopy       DL Blade Type: Hinojosa 2       Grade View of Cords: 1       Adjucts: stylet       Position: Right       Measured from: lips       Secured at (cm): 23       Bite block used: None    Post intubation assessment        Placement verified by: capnometry and chest rise        Number of attempts at approach: 1       Secured with: tape       Ease of procedure: easy       Dentition: Intact and Unchanged    Medication(s) Administered   Medication Administration Time: 2/12/2024 7:51 AM

## 2024-02-12 NOTE — ANESTHESIA CARE TRANSFER NOTE
Patient: Leandra Chris    Procedure: Procedure(s):  FISTULOTOMY, RECTUM       Diagnosis: Anal fistula [K60.3]  Diagnosis Additional Information: No value filed.    Anesthesia Type:   General     Note:    Oropharynx: oropharynx clear of all foreign objects and spontaneously breathing  Level of Consciousness: awake  Oxygen Supplementation: nasal cannula  Level of Supplemental Oxygen (L/min / FiO2): 4  Independent Airway: airway patency satisfactory and stable  Dentition: dentition unchanged  Vital Signs Stable: post-procedure vital signs reviewed and stable  Report to RN Given: handoff report given  Patient transferred to: PACU    Handoff Report: Identifed the Patient, Identified the Reponsible Provider, Reviewed the pertinent medical history, Discussed the surgical course, Reviewed Intra-OP anesthesia mangement and issues during anesthesia, Set expectations for post-procedure period and Allowed opportunity for questions and acknowledgement of understanding      Vitals:  Vitals Value Taken Time   /86 02/12/24 0838   Temp     Pulse 105 02/12/24 0841   Resp 12 02/12/24 0841   SpO2 100 % 02/12/24 0841   Vitals shown include unfiled device data.    Electronically Signed By: EDISON Spear CRNA  February 12, 2024  8:42 AM

## 2024-02-12 NOTE — DISCHARGE INSTRUCTIONS
Contacting your Doctor -   To contact a doctor, call Dr. Magana's clinic at [phone] or [phone] and ask for the colo-rectal resident on call (answered 24 hours a day)   Emergency Department:  [facility]: [phone]  [facility]: [phone] 911 if you are in need of immediate or emergent help

## 2024-02-12 NOTE — ANESTHESIA POSTPROCEDURE EVALUATION
Patient: Leandra Chris    Procedure: Procedure(s):  FISTULOTOMY, RECTUM       Anesthesia Type:  General    Note:  Disposition: Outpatient   Postop Pain Control: Uneventful            Sign Out: Well controlled pain   PONV: No   Neuro/Psych: Uneventful            Sign Out: Acceptable/Baseline neuro status   Airway/Respiratory: Uneventful            Sign Out: Acceptable/Baseline resp. status   CV/Hemodynamics: Uneventful            Sign Out: Acceptable CV status; No obvious hypovolemia; No obvious fluid overload   Other NRE: NONE   DID A NON-ROUTINE EVENT OCCUR? No           Last vitals:  Vitals Value Taken Time   /78 02/12/24 0900   Temp 36.7  C (98.1  F) 02/12/24 0840   Pulse 95 02/12/24 0903   Resp 15 02/12/24 0900   SpO2 98 % 02/12/24 0903   Vitals shown include unfiled device data.    Electronically Signed By: Jeaneth Mary MD  February 12, 2024  9:04 AM

## 2024-02-12 NOTE — ANESTHESIA PREPROCEDURE EVALUATION
Anesthesia Pre-Procedure Evaluation    Patient: Leandra Chris   MRN: [id number] : 1975        Procedure : Procedure(s):  FISTULOTOMY, RECTUM, POSSIBLE SETON PLACEMENT          Past Medical History:   Diagnosis Date    Conductive hearing loss 2020    Not sure which kind of hearing loss    Depressive disorder     Hoarseness 12/15/2019    Not all the time    LSIL (low grade squamous intraepithelial lesion) on Pap smear     colp neg    Migraines     Mild major depression (H24) 2018    Obstructive sleep apnea 2011    moderate    PCOS (polycystic ovarian syndrome)     Pneumonia     Sensorineural hearing loss 2020    Not sure which kind of hearing loss      Past Surgical History:   Procedure Laterality Date    COLONOSCOPY N/A 2024    Procedure: Colonoscopy;  Surgeon: Himanshu Pop MD;  Location:  GI    COLONOSCOPY      NO HISTORY OF SURGERY        Allergies   Allergen Reactions    Penicillin [Penicillins]     Trazodone      Other reaction(s): Headache  Pt reports after taking trazodone she had a migraine requiring IM medication    Shellfish-Derived Products Rash      Social History     Tobacco Use    Smoking status: Former     Packs/day: 0.50     Years: 13.00     Additional pack years: 0.00     Total pack years: 6.50     Types: Cigarettes, Clove cigarettes or kreteks     Start date: 1999     Quit date: 2012     Years since quittin.0    Smokeless tobacco: Never   Substance Use Topics    Alcohol use: Yes     Comment: rarely      Wt Readings from Last 1 Encounters:   24 123.4 kg (272 lb 0.8 oz)        Anesthesia Evaluation            ROS/MED HX  ENT/Pulmonary:     (+) sleep apnea, uses CPAP,                                      Neurologic:     (+)      migraines,                          Cardiovascular:     (+)  - -   -  - -                                 Previous cardiac testing   Echo: Date:  Results:  Interpretation Summary  Global and regional  left ventricular function is normal with an EF of 60-65%.  Global right ventricular function is normal.  Previous study not available for comparison.  The patient's rhythm is normal sinus.    Stress Test:  Date: 12/21 Results:  Negative for ischemia    ECG Reviewed:  Date: Results:    Cath:  Date: Results:      METS/Exercise Tolerance:     Hematologic:  - neg hematologic  ROS     Musculoskeletal:  - neg musculoskeletal ROS     GI/Hepatic:  - neg GI/hepatic ROS     Renal/Genitourinary:  - neg Renal ROS     Endo:     (+)               Obesity,       Psychiatric/Substance Use:     (+) psychiatric history depression (PTSD)       Infectious Disease:  - neg infectious disease ROS     Malignancy:  - neg malignancy ROS     Other:            Physical Exam    Airway        Mallampati: II       Respiratory Devices and Support         Dental       (+) Minor Abnormalities - some fillings, tiny chips      Cardiovascular             Pulmonary                   OUTSIDE LABS:  CBC:   Lab Results   Component Value Date    WBC 8.7 02/09/2024    WBC 5.0 07/25/2022    HGB 14.0 02/09/2024    HGB 13.9 07/25/2022    HCT 42.0 02/09/2024    HCT 43.7 07/25/2022     02/09/2024     07/25/2022     BMP:   Lab Results   Component Value Date     07/11/2023     07/25/2022    POTASSIUM 4.8 07/11/2023    POTASSIUM 4.4 07/25/2022    CHLORIDE 103 07/11/2023    CHLORIDE 105 07/25/2022    CO2 24 07/11/2023    CO2 26 07/25/2022    BUN 11.6 07/11/2023    BUN 12.0 07/25/2022    CR 0.78 07/11/2023    CR 0.65 07/25/2022     (H) 07/11/2023    GLC 91 07/25/2022     COAGS:   Lab Results   Component Value Date    PTT 33 06/25/2020    INR 0.97 06/25/2020     POC:   Lab Results   Component Value Date    HCG Negative 02/26/2005    HCGS Negative 07/25/2022     HEPATIC:   Lab Results   Component Value Date    ALBUMIN 4.0 07/25/2022    PROTTOTAL 7.2 07/25/2022    ALT 20 07/25/2022    AST 27 07/25/2022    ALKPHOS 95 07/25/2022     "BILITOTAL 0.2 07/25/2022     OTHER:   Lab Results   Component Value Date    A1C 5.7 06/30/2015    ABDIRAHMAN 9.4 07/11/2023    TSH 0.60 06/10/2019    SED 17 05/04/2021       Anesthesia Plan    ASA Status:  3    NPO Status:  NPO Appropriate    Anesthesia Type: General.     - Airway: ETT   Induction: Intravenous.   Maintenance: Balanced.   Techniques and Equipment:     - Lines/Monitors: BIS     Consents    Anesthesia Plan(s) and associated risks, benefits, and realistic alternatives discussed. Questions answered and patient/representative(s) expressed understanding.     - Discussed:     - Discussed with:  Patient            Postoperative Care    Pain management: IV analgesics, Oral pain medications.   PONV prophylaxis: Ondansetron (or other 5HT-3), Dexamethasone or Solumedrol     Comments:               Jeaneth Mary MD    I have reviewed the pertinent notes and labs in the chart from the past 30 days and (re)examined the patient.  Any updates or changes from those notes are reflected in this note.              # Severe Obesity: Estimated body mass index is 43.91 kg/m  as calculated from the following:    Height as of this encounter: 1.676 m (5' 6\").    Weight as of this encounter: 123.4 kg (272 lb 0.8 oz).      "

## 2024-02-12 NOTE — OP NOTE
**This note replaces a dictated procedure note.**    Operative Note  Leandra Chris  [address] [id number]  49 year old  female      Preoperative Diagnosis: Anterior intersphincteric fistula    Postoperative Diagnosis: Same, arising from probable healed anal fissure    Procedure: Exam under anesthesia, fistulotomy    Surgeon: Carmen Magana M.D.    Assistant: Rachna Webster M.D.    Anesthesia: General    EBL: minimal  - 1 mL    Findings: Anterior midline internal opening with granulation tissue.  Hook probe lead into fistula/abscess cavity.  Fistulotomy divided a few distal fibers of the internal sphincter muscle.  Tag excised with it.    Specimens: None    Complications: None    Indications: Leandra Chris is a 49 year old year old female who presents for operative treatment of a recurrent anterior abscess/draining internal fistula. The patient understands the risks and benefits of the procedure, including a possible change in anal continence or further operative intervention, and agrees to proceed.    Procedure: The patient was brought into the operating room and placed under general anesthesia. The patient was then placed in the prone jackie-knife position. The buttocks were then taped, prepped and draped in the usual fashion.  Exam under anesthesia was then performed using the Mauro bivalve retractor. Findings were noted as above.    Fistulotomy was then performed by easily placing a hook probe into the internal opening leading to the anoderm just outside the sphincter complex.  The tissue over the probe was divided with cautery, and the small tag/papilla associated with this was excised.  Hemostasis in the operative bed was achieved with cautery.  A local block was then placed using 30 ml 1/2% marcaine with epinephrine. All wounds were re-inspected and found to have good  hemostasis. The wound was then cleaned and dressed with dry gauze. The patient tolerated the procedure well without  complications. Sponge and instrument count were correct times two at the end of the procedure.       EMERSON MORRIS MD

## 2024-02-20 ENCOUNTER — PRE VISIT (OUTPATIENT)
Dept: SURGERY | Facility: CLINIC | Age: 49
End: 2024-02-20

## 2024-02-23 ENCOUNTER — OFFICE VISIT (OUTPATIENT)
Dept: SURGERY | Facility: CLINIC | Age: 49
End: 2024-02-23
Payer: COMMERCIAL

## 2024-02-23 VITALS
HEIGHT: 66 IN | SYSTOLIC BLOOD PRESSURE: 122 MMHG | TEMPERATURE: 98.8 F | DIASTOLIC BLOOD PRESSURE: 81 MMHG | WEIGHT: 277.2 LBS | BODY MASS INDEX: 44.55 KG/M2 | OXYGEN SATURATION: 98 % | HEART RATE: 66 BPM

## 2024-02-23 DIAGNOSIS — Z09 FOLLOW-UP EXAMINATION AFTER COLORECTAL SURGERY: Primary | ICD-10-CM

## 2024-02-23 PROCEDURE — 99024 POSTOP FOLLOW-UP VISIT: CPT

## 2024-02-23 RX ORDER — IBUPROFEN 200 MG
200 TABLET ORAL EVERY 4 HOURS PRN
COMMUNITY

## 2024-02-23 ASSESSMENT — PAIN SCALES - GENERAL: PAINLEVEL: MILD PAIN (3)

## 2024-02-23 NOTE — NURSING NOTE
"Chief Complaint   Patient presents with    Surgical Followup     Post-op, DOS 2/12/24.       Vitals:    02/23/24 1512   BP: 122/81   BP Location: Left arm   Patient Position: Sitting   Cuff Size: Adult Large   Pulse: 66   Temp: 98.8  F (37.1  C)   TempSrc: Oral   SpO2: 98%   Weight: 277 lb 3.2 oz   Height: 5' 6\"       Body mass index is 44.74 kg/m .    Patient reports mild rectal pain (3/10).    Anshu Dumont, EMT    "

## 2024-02-23 NOTE — LETTER
"2024       RE: Leandra Chris   [facility] [id number]       Dear Colleague,    Thank you for referring your patient, Leandra Chris, to the [facility] COLON AND RECTAL SURGERY CLINIC [place] at [facility]. Please see a copy of my visit note below.    Colon and Rectal Surgery Postoperative Clinic Note    RE: Leandra Chris  : 1975  CIRILO: 2024    Leandra Chris is a very pleasant 49 year old female now status post lay open fistulotomy on 24 with Dr. Magana.    Interval history: Doing well. Minimal pain. Overall drainage has been improving, but today there was more than usual. 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 Bowel movements normal. No fecal leakage.    Physical Examination: Exam was chaperoned by Anshu Dumont, EMT-B   /81 (BP Location: Left arm, Patient Position: Sitting, Cuff Size: Adult Large)   Pulse 66   Temp 98.8  F (37.1  C) (Oral)   Ht 5' 6\"   Wt 277 lb 3.2 oz   LMP 2023 (Exact Date)   SpO2 98%   BMI 44.74 kg/m    General: alert, oriented, in no acute distress  Perianal external examination:  Fistulotomy site open in the anterior midline. No surrounding erythema. Small amount of fibrinous exudate.     Assessment/Plan:  49 year old female now status post lay open fistulotomy on 24 with Dr. Magana. Doing great after surgery. Keep a gauze tucked for drainage. Follow up as needed. Patient's questions were answered to her stated satisfaction and she is in agreement with this plan.     Medical history:  Past Medical History:   Diagnosis Date    Conductive hearing loss 2020    Not sure which kind of hearing loss    Depressive disorder     Hoarseness 12/15/2019    Not all the time    LSIL (low grade squamous intraepithelial lesion) on Pap smear     colp neg    Migraines     Mild major depression (H24) 2018    Obstructive sleep apnea " 01/2011    moderate    PCOS (polycystic ovarian syndrome)     Pneumonia     Sensorineural hearing loss 01/30/2020    Not sure which kind of hearing loss       Surgical history:  Past Surgical History:   Procedure Laterality Date    COLONOSCOPY N/A 01/08/2024    Procedure: Colonoscopy;  Surgeon: Himanshu Pop MD;  Location: [facility]    COLONOSCOPY      FISTULOTOMY RECTUM N/A 2/12/2024    Procedure: FISTULOTOMY, RECTUM;  Surgeon: Carmen Magana MD;  Location: [facility]    NO HISTORY OF SURGERY         Problem list:    Patient Active Problem List    Diagnosis Date Noted    Anal fistula 01/18/2024     Priority: Medium    SERGE (obstructive sleep apnea) 08/05/2021     Priority: Medium     PSG [facility] 1/13/2011  Wt 253 BMI 39.7  AHI 21 RDI 48.8 Lowest O2 sat 72 %        TMJ disease 08/05/2021     Priority: Medium    Seasonal affective disorder (H24) 11/22/2019     Priority: Medium    Tobacco dependence in remission 10/21/2019     Priority: Medium    Specific phobia 10/21/2019     Priority: Medium     Flying      PTSD (post-traumatic stress disorder) 10/21/2019     Priority: Medium    Moderate major depression (H) 09/26/2019     Priority: Medium    BMI of 40.0-44.9, adult (H) 04/04/2017     Priority: Medium    Migraine headache 10/21/2011     Priority: Medium    Obesity 10/21/2011     Priority: Medium    CARDIOVASCULAR SCREENING; LDL GOAL LESS THAN 160 10/31/2010     Priority: Medium    Insomnia 07/08/2010     Priority: Medium    Vitamin D deficiency 04/09/2009     Priority: Medium    Overdose, drug 03/07/2009     Priority: Medium    PCOS (polycystic ovarian syndrome) 02/20/2009     Priority: Medium       Medications:  Current Outpatient Medications   Medication Sig Dispense Refill    Acetaminophen (TYLENOL PO) Take by mouth every 4 hours as needed for mild pain or fever Reported on 4/4/2017      albuterol (PROAIR HFA/PROVENTIL HFA/VENTOLIN HFA) 108 (90 BASE) MCG/ACT Inhaler Inhale 2 puffs into the lungs every 6 hours       ALPRAZolam (XANAX) 0.5 MG tablet Take 1-2 tablets 45 minutes prior to flight 20 tablet 0    butalbital-aspirin-caffeine (FIORINAL) -40 MG capsule TAKE 1 CAPSULE BY MOUTH EVERY 4 HOURS AS NEEDED FOR PAIN 20 capsule 0    ibuprofen (ADVIL/MOTRIN) 200 MG tablet Take 200 mg by mouth every 4 hours as needed for pain      meloxicam (MOBIC) 7.5 MG tablet Take 1 tablet (7.5 mg) by mouth daily 30 tablet 12    nifedipine 0.2% in white petrolatum 0.2 % OINT ointment Apply topically 2 times daily 100 g 1    zolpidem (AMBIEN) 10 MG tablet TAKE 1 TABLET BY MOUTH EVERY NIGHT AS NEEDED FOR SLEEP 30 tablet 5    menthol-zinc oxide (CALMOSEPTINE) 0.44-20.6 % OINT ointment Apply pea-size amount to anus and surrounding skin three times daily as needed for skin irritation. (Patient not taking: Reported on 2/23/2024) 71 g 0    oxyCODONE (ROXICODONE) 5 MG tablet Take 1-2 tablets (5-10 mg) by mouth every 3 hours as needed for severe pain (Patient not taking: Reported on 2/23/2024) 8 tablet 0       Allergies:  Allergies   Allergen Reactions    Penicillin [Penicillins]     Trazodone      Other reaction(s): Headache  Pt reports after taking trazodone she had a migraine requiring IM medication    Shellfish-Derived Products Rash       Family history:  Family History   Problem Relation Age of Onset    Prostate Cancer Father     Snoring Father     Asthma Mother     Chronic Obstructive Pulmonary Disease Mother     Depression Mother     Migraines Sister     Depression Sister     Anxiety Disorder Sister     Substance Abuse Brother     Depression Brother     Hypertension Maternal Grandmother     Cerebrovascular Disease Maternal Grandmother         Several mini strokes    Dementia Maternal Grandmother     Cerebrovascular Disease Other         Massive stroke    Other Cancer Cousin         Brain cancer    Other Cancer Paternal Grandmother         Brain cancer    Depression Niece     Anxiety Disorder Niece     C.A.D. No family hx of     Diabetes  "No family hx of     Breast Cancer No family hx of     Cancer - colorectal No family hx of        Social history:  Social History     Tobacco Use    Smoking status: Former     Packs/day: 0.50     Years: 13.00     Additional pack years: 0.00     Total pack years: 6.50     Types: Cigarettes, Clove cigarettes or kreteks     Start date: 1999     Quit date: 2012     Years since quittin.0    Smokeless tobacco: Never   Substance Use Topics    Alcohol use: Yes     Comment: rarely     Marital status: .  Occupation: teacher.    Nursing Notes:   Anshu Dumont  2024  3:16 PM  Signed  Chief Complaint   Patient presents with    Surgical Followup     Post-op, DOS 24.       Vitals:    24 1512   BP: 122/81   BP Location: Left arm   Patient Position: Sitting   Cuff Size: Adult Large   Pulse: 66   Temp: 98.8  F (37.1  C)   TempSrc: Oral   SpO2: 98%   Weight: 277 lb 3.2 oz   Height: 5' 6\"       Body mass index is 44.74 kg/m .    Patient reports mild rectal pain (3/10).    Anshu Dumont, EMT       15 minutes spent on the date of the encounter doing chart review, history and exam, documentation and further activities as noted above.   This is a postop visit.          Again, thank you for allowing me to participate in the care of your patient.      Sincerely,    Martha Brooks PA-C    "

## 2024-02-23 NOTE — PROGRESS NOTES
"Colon and Rectal Surgery Postoperative Clinic Note    RE: Leandra Chris  : 1975  CIRILO: 2024    Leandra Chris is a very pleasant 49 year old female now status post lay open fistulotomy on 24 with Dr. Magana.    Interval history: Doing well. Minimal pain. Overall drainage has been improving, but today there was more than usual. 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 Bowel movements normal. No fecal leakage.    Physical Examination: Exam was chaperoned by Anshu Dumont, EMT-B   /81 (BP Location: Left arm, Patient Position: Sitting, Cuff Size: Adult Large)   Pulse 66   Temp 98.8  F (37.1  C) (Oral)   Ht 5' 6\"   Wt 277 lb 3.2 oz   LMP 2023 (Exact Date)   SpO2 98%   BMI 44.74 kg/m    General: alert, oriented, in no acute distress  Perianal external examination:  Fistulotomy site open in the anterior midline. No surrounding erythema. Small amount of fibrinous exudate.     Assessment/Plan:  49 year old female now status post lay open fistulotomy on 24 with Dr. Magana. Doing great after surgery. Keep a gauze tucked for drainage. Follow up as needed. Patient's questions were answered to her stated satisfaction and she is in agreement with this plan.     Medical history:  Past Medical History:   Diagnosis Date    Conductive hearing loss 2020    Not sure which kind of hearing loss    Depressive disorder     Hoarseness 12/15/2019    Not all the time    LSIL (low grade squamous intraepithelial lesion) on Pap smear     colp neg    Migraines     Mild major depression (H24) 2018    Obstructive sleep apnea 2011    moderate    PCOS (polycystic ovarian syndrome)     Pneumonia     Sensorineural hearing loss 2020    Not sure which kind of hearing loss       Surgical history:  Past Surgical History:   Procedure Laterality Date    COLONOSCOPY N/A 2024    Procedure: Colonoscopy;  Surgeon: Himanshu Pop MD;  " Location: [facility]    COLONOSCOPY      FISTULOTOMY RECTUM N/A 2/12/2024    Procedure: FISTULOTOMY, RECTUM;  Surgeon: Carmen Magana MD;  Location: [facility]    NO HISTORY OF SURGERY         Problem list:    Patient Active Problem List    Diagnosis Date Noted    Anal fistula 01/18/2024     Priority: Medium    SERGE (obstructive sleep apnea) 08/05/2021     Priority: Medium     PSG [facility] 1/13/2011  Wt 253 BMI 39.7  AHI 21 RDI 48.8 Lowest O2 sat 72 %        TMJ disease 08/05/2021     Priority: Medium    Seasonal affective disorder (H24) 11/22/2019     Priority: Medium    Tobacco dependence in remission 10/21/2019     Priority: Medium    Specific phobia 10/21/2019     Priority: Medium     Flying      PTSD (post-traumatic stress disorder) 10/21/2019     Priority: Medium    Moderate major depression (H) 09/26/2019     Priority: Medium    BMI of 40.0-44.9, adult (H) 04/04/2017     Priority: Medium    Migraine headache 10/21/2011     Priority: Medium    Obesity 10/21/2011     Priority: Medium    CARDIOVASCULAR SCREENING; LDL GOAL LESS THAN 160 10/31/2010     Priority: Medium    Insomnia 07/08/2010     Priority: Medium    Vitamin D deficiency 04/09/2009     Priority: Medium    Overdose, drug 03/07/2009     Priority: Medium    PCOS (polycystic ovarian syndrome) 02/20/2009     Priority: Medium       Medications:  Current Outpatient Medications   Medication Sig Dispense Refill    Acetaminophen (TYLENOL PO) Take by mouth every 4 hours as needed for mild pain or fever Reported on 4/4/2017      albuterol (PROAIR HFA/PROVENTIL HFA/VENTOLIN HFA) 108 (90 BASE) MCG/ACT Inhaler Inhale 2 puffs into the lungs every 6 hours      ALPRAZolam (XANAX) 0.5 MG tablet Take 1-2 tablets 45 minutes prior to flight 20 tablet 0    butalbital-aspirin-caffeine (FIORINAL) -40 MG capsule TAKE 1 CAPSULE BY MOUTH EVERY 4 HOURS AS NEEDED FOR PAIN 20 capsule 0    ibuprofen (ADVIL/MOTRIN) 200 MG tablet Take 200 mg by mouth every 4 hours as needed for pain       meloxicam (MOBIC) 7.5 MG tablet Take 1 tablet (7.5 mg) by mouth daily 30 tablet 12    nifedipine 0.2% in white petrolatum 0.2 % OINT ointment Apply topically 2 times daily 100 g 1    zolpidem (AMBIEN) 10 MG tablet TAKE 1 TABLET BY MOUTH EVERY NIGHT AS NEEDED FOR SLEEP 30 tablet 5    menthol-zinc oxide (CALMOSEPTINE) 0.44-20.6 % OINT ointment Apply pea-size amount to anus and surrounding skin three times daily as needed for skin irritation. (Patient not taking: Reported on 2/23/2024) 71 g 0    oxyCODONE (ROXICODONE) 5 MG tablet Take 1-2 tablets (5-10 mg) by mouth every 3 hours as needed for severe pain (Patient not taking: Reported on 2/23/2024) 8 tablet 0       Allergies:  Allergies   Allergen Reactions    Penicillin [Penicillins]     Trazodone      Other reaction(s): Headache  Pt reports after taking trazodone she had a migraine requiring IM medication    Shellfish-Derived Products Rash       Family history:  Family History   Problem Relation Age of Onset    Prostate Cancer Father     Snoring Father     Asthma Mother     Chronic Obstructive Pulmonary Disease Mother     Depression Mother     Migraines Sister     Depression Sister     Anxiety Disorder Sister     Substance Abuse Brother     Depression Brother     Hypertension Maternal Grandmother     Cerebrovascular Disease Maternal Grandmother         Several mini strokes    Dementia Maternal Grandmother     Cerebrovascular Disease Other         Massive stroke    Other Cancer Cousin         Brain cancer    Other Cancer Paternal Grandmother         Brain cancer    Depression Niece     Anxiety Disorder Niece     C.A.D. No family hx of     Diabetes No family hx of     Breast Cancer No family hx of     Cancer - colorectal No family hx of        Social history:  Social History     Tobacco Use    Smoking status: Former     Packs/day: 0.50     Years: 13.00     Additional pack years: 0.00     Total pack years: 6.50     Types: Cigarettes, Clove cigarettes or kreteks      "Start date: 1999     Quit date: 2012     Years since quittin.0    Smokeless tobacco: Never   Substance Use Topics    Alcohol use: Yes     Comment: rarely     Marital status: .  Occupation: teacher.    Nursing Notes:   Anshu Dumont  2024  3:16 PM  Signed  Chief Complaint   Patient presents with    Surgical Followup     Post-op, DOS 24.       Vitals:    24 1512   BP: 122/81   BP Location: Left arm   Patient Position: Sitting   Cuff Size: Adult Large   Pulse: 66   Temp: 98.8  F (37.1  C)   TempSrc: Oral   SpO2: 98%   Weight: 277 lb 3.2 oz   Height: 5' 6\"       Body mass index is 44.74 kg/m .    Patient reports mild rectal pain (3/10).    Anshu Dumont, EMT       15 minutes spent on the date of the encounter doing chart review, history and exam, documentation and further activities as noted above.   This is a postop visit.      Martha Brooks PA-C  Colon and Rectal Surgery  [facility]    "

## 2024-03-17 NOTE — TELEPHONE ENCOUNTER
Was put on keflex 500 mg 3 times daily for 10 days on 12/29/2019. Is not feeling any better, do you want to change meds?   intact

## 2024-04-24 ENCOUNTER — E-VISIT (OUTPATIENT)
Dept: URGENT CARE | Facility: CLINIC | Age: 49
End: 2024-04-24
Payer: COMMERCIAL

## 2024-04-24 DIAGNOSIS — R30.0 DIFFICULT OR PAINFUL URINATION: Primary | ICD-10-CM

## 2024-04-24 PROCEDURE — 99207 PR NON-BILLABLE SERV PER CHARTING: CPT | Performed by: NURSE PRACTITIONER

## 2024-04-25 ENCOUNTER — LAB (OUTPATIENT)
Dept: LAB | Facility: CLINIC | Age: 49
End: 2024-04-25
Payer: COMMERCIAL

## 2024-04-25 ENCOUNTER — MYC MEDICAL ADVICE (OUTPATIENT)
Dept: FAMILY MEDICINE | Facility: CLINIC | Age: 49
End: 2024-04-25

## 2024-04-25 DIAGNOSIS — R30.0 DIFFICULT OR PAINFUL URINATION: ICD-10-CM

## 2024-04-25 LAB
ALBUMIN UR-MCNC: NEGATIVE MG/DL
APPEARANCE UR: ABNORMAL
BACTERIA #/AREA URNS HPF: ABNORMAL /HPF
BILIRUB UR QL STRIP: NEGATIVE
COLOR UR AUTO: YELLOW
GLUCOSE UR STRIP-MCNC: NEGATIVE MG/DL
HGB UR QL STRIP: ABNORMAL
KETONES UR STRIP-MCNC: NEGATIVE MG/DL
LEUKOCYTE ESTERASE UR QL STRIP: ABNORMAL
MUCOUS THREADS #/AREA URNS LPF: PRESENT /LPF
NITRATE UR QL: NEGATIVE
PH UR STRIP: 6 [PH] (ref 5–7)
RBC #/AREA URNS AUTO: ABNORMAL /HPF
SP GR UR STRIP: 1.02 (ref 1–1.03)
SQUAMOUS #/AREA URNS AUTO: ABNORMAL /LPF
UROBILINOGEN UR STRIP-ACNC: 0.2 E.U./DL
WBC #/AREA URNS AUTO: ABNORMAL /HPF

## 2024-04-25 PROCEDURE — 81001 URINALYSIS AUTO W/SCOPE: CPT

## 2024-04-25 PROCEDURE — 87086 URINE CULTURE/COLONY COUNT: CPT

## 2024-04-25 NOTE — TELEPHONE ENCOUNTER
"Rebecca-Please review and advise if CHELO/same day slot with you can be offered to patient for follow up?  Today's UA result noted trace blood but microscopic exam did not note presence of RBCs    \"Percy Fernandez,  I was diagnosed with a uti on April 12 via [facility] and took antibiotics and felt much better quickly.   On Tuesday night I noticed blood in my urine when I wiped. I have been having some symptoms of increased urgency and more frequent trips for at least several months, but was thinking again it was a uti because of pain/pressure around my pelvic floor. I had a lab test today ordered from an evisit, but they said it is negative for a uti. She said make an appointment with you if I still have symptoms in a week but this has been going on for a while and I don t see any appts with you for a while. What are next steps?\"    Thank you!  NELA SantoroN, RN-[facility]    "

## 2024-04-25 NOTE — PATIENT INSTRUCTIONS
Dear Leandra Chris,     After reviewing your responses, I would like you to come in for a urine test to make sure we treat you correctly. This urine test is to evaluate you for a possible urinary tract infection, and should be scheduled for today or tomorrow. Schedule a Lab Only appointment here.     Lab appointments are not available at most locations on the weekends. If no Lab Only appointment is available, you should be seen in any of our convenient Walk-in or Urgent Care Centers, which can be found on our website here.     You will receive instructions with your results in [employer] once they are available.     If your symptoms worsen, you develop pain in your back or stomach, develop fevers, or are not improving in 5 days, please contact your primary care provider for an appointment or visit a Walk-in or Urgent Care Center to be seen.     Thanks again for choosing us as your health care partner,     Dori Lambert CNP    Since you were just treated for a UTI without a urinalysis, we need to get one done

## 2024-04-27 LAB — BACTERIA UR CULT: NORMAL

## 2024-04-29 ENCOUNTER — NURSE TRIAGE (OUTPATIENT)
Dept: FAMILY MEDICINE | Facility: CLINIC | Age: 49
End: 2024-04-29
Payer: COMMERCIAL

## 2024-04-29 ASSESSMENT — PATIENT HEALTH QUESTIONNAIRE - PHQ9
SUM OF ALL RESPONSES TO PHQ QUESTIONS 1-9: 9
SUM OF ALL RESPONSES TO PHQ QUESTIONS 1-9: 9
10. IF YOU CHECKED OFF ANY PROBLEMS, HOW DIFFICULT HAVE THESE PROBLEMS MADE IT FOR YOU TO DO YOUR WORK, TAKE CARE OF THINGS AT HOME, OR GET ALONG WITH OTHER PEOPLE: SOMEWHAT DIFFICULT

## 2024-04-29 NOTE — TELEPHONE ENCOUNTER
S-(situation): Called back to report symptoms of urgency, frequency and painful with urination.  Has had a E-visit on 4/24/24 but not determined symptoms were not of UTI.  Patient symptoms worsening and went to urgency room on 4/27/24 with an antibiotic started of Cephalexin 500 mg cap.  ! Capsule BID.  Patient started medication on Sunday, 4/28/24 and have not seen much difference yet.    B-(background): urgency, frequency and painful urination.  Urgency room provider recommended patient to follow up with pcp due to trace of blood in urine as well.     A-(assessment): Urgency, frequency and painful with urination.     R-(recommendations): Appointment with provider.  Was able to secure a sooner appointment at [facility] required slot with provider for 4/30/24.  Date, time and location confirmed with patient.        Mani Montilla RN  [facility]

## 2024-04-30 ENCOUNTER — OFFICE VISIT (OUTPATIENT)
Dept: FAMILY MEDICINE | Facility: CLINIC | Age: 49
End: 2024-04-30
Payer: COMMERCIAL

## 2024-04-30 VITALS
HEIGHT: 66 IN | OXYGEN SATURATION: 98 % | HEART RATE: 69 BPM | SYSTOLIC BLOOD PRESSURE: 128 MMHG | WEIGHT: 273.5 LBS | BODY MASS INDEX: 43.96 KG/M2 | TEMPERATURE: 97.9 F | RESPIRATION RATE: 16 BRPM | DIASTOLIC BLOOD PRESSURE: 78 MMHG

## 2024-04-30 DIAGNOSIS — R39.15 URINARY URGENCY: Primary | ICD-10-CM

## 2024-04-30 PROCEDURE — 99214 OFFICE O/P EST MOD 30 MIN: CPT | Performed by: NURSE PRACTITIONER

## 2024-04-30 RX ORDER — CEPHALEXIN 500 MG/1
500 CAPSULE ORAL 2 TIMES DAILY
COMMUNITY
Start: 2024-04-27 | End: 2024-05-04

## 2024-04-30 RX ORDER — TOLTERODINE 4 MG/1
4 CAPSULE, EXTENDED RELEASE ORAL DAILY
Qty: 30 CAPSULE | Refills: 5 | Status: SHIPPED | OUTPATIENT
Start: 2024-04-30 | End: 2024-04-30

## 2024-04-30 ASSESSMENT — PAIN SCALES - GENERAL: PAINLEVEL: MILD PAIN (3)

## 2024-04-30 NOTE — PROGRESS NOTES
"  Assessment & Plan     (R39.15) Urinary urgency  (primary encounter diagnosis)  Comment:   Plan: Adult Urology  Referral, Physical         Therapy  Referral, tolterodine ER         (DETROL LA) 4 MG 24 hr capsule        Will refer for pelvic floor therapy and to urology if symptoms don't improve.  In the meantime, will try Detrol for OAB.  Discussed the use and indication of this medication as well as potential side effects.       30 minutes Time spent by me doing chart review, history and exam, documentation and further activities per the note      BMI  Estimated body mass index is 44.14 kg/m  as calculated from the following:    Height as of this encounter: 1.676 m (5' 6\").    Weight as of this encounter: 124.1 kg (273 lb 8 oz).             Ariela Mobley is a 49 year old, presenting for the following health issues:  UTI and Urinary Pain        4/30/2024     9:43 AM   Additional Questions   Roomed by Kassi ontiveros   Accompanied by Self     History of Present Illness       Reason for visit:  Blood in urine, freequent and painful urine  Symptom onset:  More than a month  Symptoms include:  Blood in urine, painful and frequent need to pass urine, muscle pain in legs  Symptom intensity:  Moderate  Symptom progression:  Staying the same  Had these symptoms before:  No  What makes it better:  Antibiotics seemed to help but then symptoms came back    She eats 2-3 servings of fruits and vegetables daily.She consumes 1 sweetened beverage(s) daily.She exercises with enough effort to increase her heart rate 20 to 29 minutes per day.  She exercises with enough effort to increase her heart rate 4 days per week.   She is taking medications regularly.         Genitourinary - Female  Onset/Duration: On and off, started back on 4/24/2024  Description:   Painful urination (Dysuria): YES           Frequency: YES  Blood in urine (Hematuria): YES  Delay in urine (Hesitency): YES  Intensity: It depends  Progression " "of Symptoms:  same  Accompanying Signs & Symptoms:  Fever/chills: No  Flank pain: YES- a little    Nausea and vomiting: No  Vaginal symptoms: none  Abdominal/Pelvic Pain: YES- some  History:   History of frequent UTI s: YES- 1 or 2    History of kidney stones: Not sure   Sexually Active: No  Possibility of pregnancy: No  Precipitating or alleviating factors: None  Therapies tried and outcome: Increase fluid intake and  water     She has had urinary urgency since last summer.  She also has been having frequency - now urinates about every hour at work, when she used to only go 2-3 times, gets up once a night.   She started having dysuria, so did a virtual visit and was given Macrobid, which helped.  Twelve days later she had an e-visit and had labs, UA was negative.  She went to the Urgency Room on 4/27 and was given Keflex.  She is still having suprapubic pressure and urgency, but the dysuria is improved.              Objective    /78 (BP Location: Right arm, Patient Position: Sitting, Cuff Size: Adult Large)   Pulse 69   Temp 97.9  F (36.6  C) (Temporal)   Resp 16   Ht 1.676 m (5' 6\")   Wt 124.1 kg (273 lb 8 oz)   LMP 04/15/2024 (Approximate)   SpO2 98%   Breastfeeding No   BMI 44.14 kg/m    Body mass index is 44.14 kg/m .  Physical Exam   GENERAL: alert and no distress  PSYCH: mentation appears normal, affect normal/bright            Signed Electronically by: Nelli Nye NP    "

## 2024-05-14 NOTE — TELEPHONE ENCOUNTER
Debra faxed over an JAYDA to talk with you regarding   Medication consultation    [phone] ok to leave message with time to call back  And to confirm that you did the release    She is on break from 2-3pm and after 5pm   Results relayed to patient. Nothing further needed at this time.

## 2024-06-13 ENCOUNTER — OFFICE VISIT (OUTPATIENT)
Dept: UROLOGY | Facility: CLINIC | Age: 49
End: 2024-06-13
Attending: NURSE PRACTITIONER
Payer: COMMERCIAL

## 2024-06-13 VITALS
SYSTOLIC BLOOD PRESSURE: 129 MMHG | BODY MASS INDEX: 44.14 KG/M2 | DIASTOLIC BLOOD PRESSURE: 82 MMHG | HEIGHT: 66 IN | HEART RATE: 70 BPM

## 2024-06-13 DIAGNOSIS — R39.15 URINARY URGENCY: ICD-10-CM

## 2024-06-13 PROCEDURE — 99213 OFFICE O/P EST LOW 20 MIN: CPT | Performed by: OBSTETRICS & GYNECOLOGY

## 2024-06-13 PROCEDURE — 99203 OFFICE O/P NEW LOW 30 MIN: CPT | Performed by: OBSTETRICS & GYNECOLOGY

## 2024-06-13 NOTE — PROGRESS NOTES
June 13, 2024    Referring Provider: Nelli Nye, NP  [address], [address]  [place] [address]    Primary Care Provider: Nelli Nye    {PROVIDER CHARTING PREFERENCE:735884}    *** minutes were spent on this day of the encounter in reviewing the EMR including ***, direct patient care, coordination of care and documentation    Evelyn Loera MD MPH  (she/her/hers)   of Urology  [facility]      HPI:  Leandra Chris is a 49 year old female who presents for evaluation of her pelvic floor symptoms.  She has ***.  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    Obstructive sleep apnea 01/2011    moderate    PCOS (polycystic ovarian syndrome)     Pneumonia     Sensorineural hearing loss 01/30/2020    Not sure which kind of hearing loss       Past Surgical History:   Procedure Laterality Date    COLONOSCOPY N/A 01/08/2024    Procedure: Colonoscopy;  Surgeon: Himanshu Pop MD;  Location: [facility]    COLONOSCOPY      FISTULOTOMY RECTUM N/A 2/12/2024    Procedure: FISTULOTOMY, RECTUM;  Surgeon: Carmen Magana MD;  Location: [facility]    NO HISTORY OF SURGERY         Social History     Socioeconomic History    Marital status:      Spouse name: Not on file    Number of children: Not on file    Years of education: Not on file    Highest education level: Not on file   Occupational History    Occupation: teacher     Employer: [employer]   Tobacco Use    Smoking status: Former     Current packs/day: 0.00     Average packs/day: 0.5 packs/day for 13.1 years (6.5 ttl pk-yrs)     Types:  Cigarettes, Clove cigarettes or kreteks     Start date: 1999     Quit date: 2012     Years since quittin.3    Smokeless tobacco: Never   Vaping Use    Vaping status: Never Used   Substance and Sexual Activity    Alcohol use: Yes     Comment: rarely    Drug use: No    Sexual activity: Yes     Partners: Female     Birth control/protection: None     Comment: Not needed   Other Topics Concern    Parent/sibling w/ CABG, MI or angioplasty before 65F 55M? No   Social History Narrative    Not on file     Social Determinants of Health     Financial Resource Strain: Low Risk  (2023)    Financial Resource Strain     Within the past 12 months, have you or your family members you live with been unable to get utilities (heat, electricity) when it was really needed?: No   Food Insecurity: Low Risk  (2023)    Food Insecurity     Within the past 12 months, did you worry that your food would run out before you got money to buy more?: No     Within the past 12 months, did the food you bought just not last and you didn t have money to get more?: No   Transportation Needs: Low Risk  (2023)    Transportation Needs     Within the past 12 months, has lack of transportation kept you from medical appointments, getting your medicines, non-medical meetings or appointments, work, or from getting things that you need?: No   Physical Activity: Not on file   Stress: Not on file   Social Connections: Not on file   Interpersonal Safety: Low Risk  (2023)    Interpersonal Safety     Do you feel physically and emotionally safe where you currently live?: Yes     Within the past 12 months, have you been hit, slapped, kicked or otherwise physically hurt by someone?: No     Within the past 12 months, have you been humiliated or emotionally abused in other ways by your partner or ex-partner?: No   Housing Stability: Low Risk  (2023)    Housing Stability     Do you have housing? : Yes     Are you worried about  losing your housing?: No       Family History   Problem Relation Age of Onset    Prostate Cancer Father     Snoring Father     Asthma Mother     Chronic Obstructive Pulmonary Disease Mother     Depression Mother     Migraines Sister     Depression Sister     Anxiety Disorder Sister     Substance Abuse Brother     Depression Brother     Hypertension Maternal Grandmother     Cerebrovascular Disease Maternal Grandmother         Several mini strokes    Dementia Maternal Grandmother     Cerebrovascular Disease Other         Massive stroke    Other Cancer Cousin         Brain cancer    Other Cancer Paternal Grandmother         Brain cancer    Depression Niece     Anxiety Disorder Niece     C.A.D. No family hx of     Diabetes No family hx of     Breast Cancer No family hx of     Cancer - colorectal No family hx of        ROS    Allergies   Allergen Reactions    Penicillins      PN: LW Reaction: HIVES    Trazodone Headache     Other reaction(s): Headache    Pt reports after taking trazodone she had a migraine requiring IM medication    Pt reports after taking trazodone she had a migraine requiring IM medication   Other reaction(s): Headache   Pt reports after taking trazodone she had a migraine requiring IM medication    Shellfish-Derived Products Rash       Current Outpatient Medications   Medication Sig Dispense Refill    butalbital-aspirin-caffeine (FIORINAL) -40 MG capsule TAKE 1 CAPSULE BY MOUTH EVERY 4 HOURS AS NEEDED FOR PAIN 20 capsule 0    meloxicam (MOBIC) 7.5 MG tablet Take 1 tablet (7.5 mg) by mouth daily 30 tablet 12    tolterodine ER (DETROL LA) 4 MG 24 hr capsule TAKE 1 CAPSULE(4 MG) BY MOUTH DAILY 90 capsule 3    zolpidem (AMBIEN) 10 MG tablet TAKE 1 TABLET BY MOUTH EVERY NIGHT AS NEEDED FOR SLEEP 30 tablet 5    Acetaminophen (TYLENOL PO) Take by mouth every 4 hours as needed for mild pain or fever Reported on 4/4/2017 (Patient not taking: Reported on 6/13/2024)      albuterol (PROAIR HFA/PROVENTIL  "HFA/VENTOLIN HFA) 108 (90 BASE) MCG/ACT Inhaler Inhale 2 puffs into the lungs every 6 hours (Patient not taking: Reported on 6/13/2024)      ALPRAZolam (XANAX) 0.5 MG tablet Take 1-2 tablets 45 minutes prior to flight (Patient not taking: Reported on 6/13/2024) 20 tablet 0    ibuprofen (ADVIL/MOTRIN) 200 MG tablet Take 200 mg by mouth every 4 hours as needed for pain (Patient not taking: Reported on 6/13/2024)       No current facility-administered medications for this visit.       /82   Pulse 70   Ht 1.676 m (5' 6\")   LMP 05/15/2024 (Approximate)   BMI 44.14 kg/m    GENERAL: healthy, alert and no distress  EYES: Eyes grossly normal to inspection, conjunctivae and sclerae normal  HENT: normal cephalic/atraumatic.  External ears, nose and mouth without ulcers or lesions.  RESP: no audible wheeze, cough, or visible cyanosis.  No visible retractions or increased work of breathing.  Able to speak fully in complete sentences.  NEURO: Cranial nerves grossly intact, mentation intact and speech normal  PSYCH: mentation appears normal, affect normal/bright, judgement and insight intact, normal speech and appearance well-groomed    {Exam List (Optional):262294}    {Diagnostic Test Results (Optional):712481}    {AMBULATORY ATTESTATION (Optional):260525}    Urine dip ***    PVR *** mL by bladder scan    Joann Aragon  MS4  8:46 AM      CC  Patient Care Team:  Terrell Nye NP as PCP - General  Maday Zpeeda APRN CNP as Nurse Practitioner (Colon & Rectal)  Terrell Nye NP as Assigned PCP  Maday Zepeda APRN CNP as Assigned Surgical Provider  Saad Sloan MD as MD (OB/Gyn)  Saad Sloan MD as MD (OB/Gyn)  TERRELL NYE             "

## 2024-06-13 NOTE — PROGRESS NOTES
June 13, 2024    Referring Provider: Nelli Nye, NP  [address], [address]  [place] [address]    Primary Care Provider: Nelli Nye    CC: urinary urgency    HPI:  Leandra Chris is a 49 year old female who presents for evaluation of her pelvic floor symptoms.  Leandra reports that she was experiencing urinary urgency and frequency, voiding up to every 1/2 hour. She was started on Detrol by her PCP. Leandra reports that has helped decrease her frequency to every 2-4 hours which is much more manageable. She does not report any side effects from the medication.      Past Medical History:   Diagnosis Date    Conductive hearing loss 01/30/2020    Not sure which kind of hearing loss    Depressive disorder     Hoarseness 12/15/2019    Not all the time    LSIL (low grade squamous intraepithelial lesion) on Pap smear 2008    colp neg    Migraines     Mild major depression (H24) 07/31/2018    Obstructive sleep apnea 01/2011    moderate    PCOS (polycystic ovarian syndrome)     Pneumonia     Sensorineural hearing loss 01/30/2020    Not sure which kind of hearing loss       Past Surgical History:   Procedure Laterality Date    COLONOSCOPY N/A 01/08/2024    Procedure: Colonoscopy;  Surgeon: Himanshu Pop MD;  Location: [facility]    COLONOSCOPY      FISTULOTOMY RECTUM N/A 2/12/2024    Procedure: FISTULOTOMY, RECTUM;  Surgeon: Carmen Magana MD;  Location: [facility]    NO HISTORY OF SURGERY         Social History     Socioeconomic History    Marital status:      Spouse name: Not on file    Number of children: Not on file    Years of education: Not on file    Highest education level: Not on file   Occupational History    Occupation: teacher     Employer: [employer]   Tobacco Use    Smoking status: Former     Current packs/day: 0.00     Average packs/day: 0.5 packs/day for 13.1 years (6.5 ttl pk-yrs)     Types: Cigarettes, Clove cigarettes or kreteks     Start date: 1/1/1999      Quit date: 2012     Years since quittin.3    Smokeless tobacco: Never   Vaping Use    Vaping status: Never Used   Substance and Sexual Activity    Alcohol use: Yes     Comment: rarely    Drug use: No    Sexual activity: Yes     Partners: Female     Birth control/protection: None     Comment: Not needed   Other Topics Concern    Parent/sibling w/ CABG, MI or angioplasty before 65F 55M? No   Social History Narrative    Not on file     Social Determinants of Health     Financial Resource Strain: Low Risk  (2023)    Financial Resource Strain     Within the past 12 months, have you or your family members you live with been unable to get utilities (heat, electricity) when it was really needed?: No   Food Insecurity: Low Risk  (2023)    Food Insecurity     Within the past 12 months, did you worry that your food would run out before you got money to buy more?: No     Within the past 12 months, did the food you bought just not last and you didn t have money to get more?: No   Transportation Needs: Low Risk  (2023)    Transportation Needs     Within the past 12 months, has lack of transportation kept you from medical appointments, getting your medicines, non-medical meetings or appointments, work, or from getting things that you need?: No   Physical Activity: Not on file   Stress: Not on file   Social Connections: Not on file   Interpersonal Safety: Low Risk  (2023)    Interpersonal Safety     Do you feel physically and emotionally safe where you currently live?: Yes     Within the past 12 months, have you been hit, slapped, kicked or otherwise physically hurt by someone?: No     Within the past 12 months, have you been humiliated or emotionally abused in other ways by your partner or ex-partner?: No   Housing Stability: Low Risk  (2023)    Housing Stability     Do you have housing? : Yes     Are you worried about losing your housing?: No       Family History   Problem Relation Age of  Onset    Prostate Cancer Father     Snoring Father     Asthma Mother     Chronic Obstructive Pulmonary Disease Mother     Depression Mother     Migraines Sister     Depression Sister     Anxiety Disorder Sister     Substance Abuse Brother     Depression Brother     Hypertension Maternal Grandmother     Cerebrovascular Disease Maternal Grandmother         Several mini strokes    Dementia Maternal Grandmother     Cerebrovascular Disease Other         Massive stroke    Other Cancer Cousin         Brain cancer    Other Cancer Paternal Grandmother         Brain cancer    Depression Niece     Anxiety Disorder Niece     C.A.D. No family hx of     Diabetes No family hx of     Breast Cancer No family hx of     Cancer - colorectal No family hx of        ROS    Allergies   Allergen Reactions    Penicillins      PN: LW Reaction: HIVES    Trazodone Headache     Other reaction(s): Headache    Pt reports after taking trazodone she had a migraine requiring IM medication    Pt reports after taking trazodone she had a migraine requiring IM medication   Other reaction(s): Headache   Pt reports after taking trazodone she had a migraine requiring IM medication    Shellfish-Derived Products Rash       Current Outpatient Medications   Medication Sig Dispense Refill    butalbital-aspirin-caffeine (FIORINAL) -40 MG capsule TAKE 1 CAPSULE BY MOUTH EVERY 4 HOURS AS NEEDED FOR PAIN 20 capsule 0    meloxicam (MOBIC) 7.5 MG tablet Take 1 tablet (7.5 mg) by mouth daily 30 tablet 12    tolterodine ER (DETROL LA) 4 MG 24 hr capsule TAKE 1 CAPSULE(4 MG) BY MOUTH DAILY 90 capsule 3    zolpidem (AMBIEN) 10 MG tablet TAKE 1 TABLET BY MOUTH EVERY NIGHT AS NEEDED FOR SLEEP 30 tablet 5    Acetaminophen (TYLENOL PO) Take by mouth every 4 hours as needed for mild pain or fever Reported on 4/4/2017 (Patient not taking: Reported on 6/13/2024)      albuterol (PROAIR HFA/PROVENTIL HFA/VENTOLIN HFA) 108 (90 BASE) MCG/ACT Inhaler Inhale 2 puffs into the  "lungs every 6 hours (Patient not taking: Reported on 6/13/2024)      ALPRAZolam (XANAX) 0.5 MG tablet Take 1-2 tablets 45 minutes prior to flight (Patient not taking: Reported on 6/13/2024) 20 tablet 0    ibuprofen (ADVIL/MOTRIN) 200 MG tablet Take 200 mg by mouth every 4 hours as needed for pain (Patient not taking: Reported on 6/13/2024)       No current facility-administered medications for this visit.       /82   Pulse 70   Ht 1.676 m (5' 6\")   LMP 05/15/2024 (Approximate)   BMI 44.14 kg/m   Patient's last menstrual period was 05/15/2024 (approximate). Body mass index is 44.14 kg/m .  Ms. Chris is alert, comfortable in no acute distress, non-labored breathing.     A/P: Leandra Chris is a 49 year old F with urinary urgency and frequency    I reassured Leandra that she is on the correct treatment and that the only other things I would add  would be a referral to PFPT. She became tearful and wanted to know if there are other things to worry about. I reassured that there were not, and I asked if there was something that she was concerned about so that I could try to address that concern. She abruptly got up and left the appointment.    I spent a total of 30 minutes with  Ms. Chris  on the date of the encounter in chart review, face to face patient visit, review of tests, documentation and/or discussion with other providers about the issues documented above.    Saad Sloan MD  Professor, OB/GYN  Urogynecologist  CC  Patient Care Team:  Terrell Nye NP as PCP - General  Maday Zepeda APRN CNP as Nurse Practitioner (Colon & Rectal)  Terrell Nye NP as Assigned PCP  Maday Zepeda APRN CNP as Assigned Surgical Provider  Saad Sloan MD as MD (OB/Gyn)  Saad Sloan MD as MD (OB/Gyn)  TERRELL NYE              "

## 2024-06-13 NOTE — LETTER
6/13/2024       RE: Leandra Chris  [address] [address]     Dear Colleague,    Thank you for referring your patient, Leandra Chris, to the [facility] [place] at . Please see a copy of my visit note below.    June 13, 2024    Referring Provider: Nelli Nye NP  [address]  [place] [address]    Primary Care Provider: Nelli Nye    CC: urinary urgency    HPI:  Leandra Chris is a 49 year old female who presents for evaluation of her pelvic floor symptoms.  Leandra reports that she was experiencing urinary urgency and frequency, voiding up to every 1/2 hour. She was started on Detrol by her PCP. Leandra reports that has helped decrease her frequency to every 2-4 hours which is much more manageable. She does not report any side effects from the medication.      Past Medical History:   Diagnosis Date    Conductive hearing loss 01/30/2020    Not sure which kind of hearing loss    Depressive disorder     Hoarseness 12/15/2019    Not all the time    LSIL (low grade squamous intraepithelial lesion) on Pap smear 2008    colp neg    Migraines     Mild major depression (H24) 07/31/2018    Obstructive sleep apnea 01/2011    moderate    PCOS (polycystic ovarian syndrome)     Pneumonia     Sensorineural hearing loss 01/30/2020    Not sure which kind of hearing loss       Past Surgical History:   Procedure Laterality Date    COLONOSCOPY N/A 01/08/2024    Procedure: Colonoscopy;  Surgeon: Himanshu Pop MD;  Location: [facility]    COLONOSCOPY      FISTULOTOMY RECTUM N/A 2/12/2024    Procedure: FISTULOTOMY, RECTUM;  Surgeon: Carmen Magana MD;  Location: [facility]    NO HISTORY OF SURGERY         Social History     Socioeconomic History    Marital status:      Spouse name: Not on file    Number of children: Not on file    Years of education: Not on file    Highest education level: Not on file    Occupational History    Occupation: teacher     Employer: [employer]   Tobacco Use    Smoking status: Former     Current packs/day: 0.00     Average packs/day: 0.5 packs/day for 13.1 years (6.5 ttl pk-yrs)     Types: Cigarettes, Clove cigarettes or kreteks     Start date: 1999     Quit date: 2012     Years since quittin.3    Smokeless tobacco: Never   Vaping Use    Vaping status: Never Used   Substance and Sexual Activity    Alcohol use: Yes     Comment: rarely    Drug use: No    Sexual activity: Yes     Partners: Female     Birth control/protection: None     Comment: Not needed   Other Topics Concern    Parent/sibling w/ CABG, MI or angioplasty before 65F 55M? No   Social History Narrative    Not on file     Social Determinants of Health     Financial Resource Strain: Low Risk  (2023)    Financial Resource Strain     Within the past 12 months, have you or your family members you live with been unable to get utilities (heat, electricity) when it was really needed?: No   Food Insecurity: Low Risk  (2023)    Food Insecurity     Within the past 12 months, did you worry that your food would run out before you got money to buy more?: No     Within the past 12 months, did the food you bought just not last and you didn t have money to get more?: No   Transportation Needs: Low Risk  (2023)    Transportation Needs     Within the past 12 months, has lack of transportation kept you from medical appointments, getting your medicines, non-medical meetings or appointments, work, or from getting things that you need?: No   Physical Activity: Not on file   Stress: Not on file   Social Connections: Not on file   Interpersonal Safety: Low Risk  (2023)    Interpersonal Safety     Do you feel physically and emotionally safe where you currently live?: Yes     Within the past 12 months, have you been hit, slapped, kicked or otherwise physically hurt by someone?: No     Within the past  12 months, have you been humiliated or emotionally abused in other ways by your partner or ex-partner?: No   Housing Stability: Low Risk  (12/21/2023)    Housing Stability     Do you have housing? : Yes     Are you worried about losing your housing?: No       Family History   Problem Relation Age of Onset    Prostate Cancer Father     Snoring Father     Asthma Mother     Chronic Obstructive Pulmonary Disease Mother     Depression Mother     Migraines Sister     Depression Sister     Anxiety Disorder Sister     Substance Abuse Brother     Depression Brother     Hypertension Maternal Grandmother     Cerebrovascular Disease Maternal Grandmother         Several mini strokes    Dementia Maternal Grandmother     Cerebrovascular Disease Other         Massive stroke    Other Cancer Cousin         Brain cancer    Other Cancer Paternal Grandmother         Brain cancer    Depression Niece     Anxiety Disorder Niece     C.A.D. No family hx of     Diabetes No family hx of     Breast Cancer No family hx of     Cancer - colorectal No family hx of        ROS    Allergies   Allergen Reactions    Penicillins      PN: LW Reaction: HIVES    Trazodone Headache     Other reaction(s): Headache    Pt reports after taking trazodone she had a migraine requiring IM medication    Pt reports after taking trazodone she had a migraine requiring IM medication   Other reaction(s): Headache   Pt reports after taking trazodone she had a migraine requiring IM medication    Shellfish-Derived Products Rash       Current Outpatient Medications   Medication Sig Dispense Refill    butalbital-aspirin-caffeine (FIORINAL) -40 MG capsule TAKE 1 CAPSULE BY MOUTH EVERY 4 HOURS AS NEEDED FOR PAIN 20 capsule 0    meloxicam (MOBIC) 7.5 MG tablet Take 1 tablet (7.5 mg) by mouth daily 30 tablet 12    tolterodine ER (DETROL LA) 4 MG 24 hr capsule TAKE 1 CAPSULE(4 MG) BY MOUTH DAILY 90 capsule 3    zolpidem (AMBIEN) 10 MG tablet TAKE 1 TABLET BY MOUTH EVERY NIGHT  "AS NEEDED FOR SLEEP 30 tablet 5    Acetaminophen (TYLENOL PO) Take by mouth every 4 hours as needed for mild pain or fever Reported on 4/4/2017 (Patient not taking: Reported on 6/13/2024)      albuterol (PROAIR HFA/PROVENTIL HFA/VENTOLIN HFA) 108 (90 BASE) MCG/ACT Inhaler Inhale 2 puffs into the lungs every 6 hours (Patient not taking: Reported on 6/13/2024)      ALPRAZolam (XANAX) 0.5 MG tablet Take 1-2 tablets 45 minutes prior to flight (Patient not taking: Reported on 6/13/2024) 20 tablet 0    ibuprofen (ADVIL/MOTRIN) 200 MG tablet Take 200 mg by mouth every 4 hours as needed for pain (Patient not taking: Reported on 6/13/2024)       No current facility-administered medications for this visit.       /82   Pulse 70   Ht 1.676 m (5' 6\")   LMP 05/15/2024 (Approximate)   BMI 44.14 kg/m   Patient's last menstrual period was 05/15/2024 (approximate). Body mass index is 44.14 kg/m .  Ms. Chrsi is alert, comfortable in no acute distress, non-labored breathing.     A/P: Leandra Chris is a 49 year old F with urinary urgency and frequency    I reassured Leandra that she is on the correct treatment and that the only other things I would add  would be a referral to PFPT. She became tearful and wanted to know if there are other things to worry about. I reassured that there were not, and I asked if there was something that she was concerned about so that I could try to address that concern. She abruptly got up and left the appointment.    I spent a total of 30 minutes with  Ms. Chris  on the date of the encounter in chart review, face to face patient visit, review of tests, documentation and/or discussion with other providers about the issues documented above.    Saad Sloan MD  Professor, OB/GYN  Urogynecologist  CC  Patient Care Team:  Nelli Nye NP as PCP - Maday Kaiser APRN CNP as Nurse Practitioner (Colon & Rectal)  Nelli Nye NP as Assigned PCP  reilly Savage, " Maday Wallace, EDISON METZ as Assigned Surgical Provider  Saad Sloan MD as MD (OB/Gyn)  Saad Sloan MD as MD (OB/Gyn)  TERRELL VIRK

## 2024-06-13 NOTE — PATIENT INSTRUCTIONS
Thank you for trusting us with your care!     If you need to contact us for questions about:  Symptoms, Scheduling & Medical Questions; Non-urgent (2-3 day response) Shruthi message, Urgent (needing response today) [phone] (if after 3:30pm next day response)   Prescriptions: Please call your Pharmacy   Billing: [facility] [phone] or [facility]:[phone]]  
21-May-2018 23:00

## 2024-07-05 SDOH — HEALTH STABILITY: PHYSICAL HEALTH: ON AVERAGE, HOW MANY MINUTES DO YOU ENGAGE IN EXERCISE AT THIS LEVEL?: 30 MIN

## 2024-07-05 SDOH — HEALTH STABILITY: PHYSICAL HEALTH: ON AVERAGE, HOW MANY DAYS PER WEEK DO YOU ENGAGE IN MODERATE TO STRENUOUS EXERCISE (LIKE A BRISK WALK)?: 3 DAYS

## 2024-07-05 ASSESSMENT — SOCIAL DETERMINANTS OF HEALTH (SDOH): HOW OFTEN DO YOU GET TOGETHER WITH FRIENDS OR RELATIVES?: TWICE A WEEK

## 2024-07-09 ASSESSMENT — PATIENT HEALTH QUESTIONNAIRE - PHQ9
10. IF YOU CHECKED OFF ANY PROBLEMS, HOW DIFFICULT HAVE THESE PROBLEMS MADE IT FOR YOU TO DO YOUR WORK, TAKE CARE OF THINGS AT HOME, OR GET ALONG WITH OTHER PEOPLE: SOMEWHAT DIFFICULT
SUM OF ALL RESPONSES TO PHQ QUESTIONS 1-9: 4
SUM OF ALL RESPONSES TO PHQ QUESTIONS 1-9: 4

## 2024-07-10 ENCOUNTER — OFFICE VISIT (OUTPATIENT)
Dept: FAMILY MEDICINE | Facility: CLINIC | Age: 49
End: 2024-07-10
Payer: COMMERCIAL

## 2024-07-10 VITALS
HEIGHT: 66 IN | RESPIRATION RATE: 18 BRPM | BODY MASS INDEX: 43.41 KG/M2 | WEIGHT: 270.1 LBS | HEART RATE: 73 BPM | DIASTOLIC BLOOD PRESSURE: 83 MMHG | SYSTOLIC BLOOD PRESSURE: 127 MMHG | TEMPERATURE: 97.5 F | OXYGEN SATURATION: 96 %

## 2024-07-10 DIAGNOSIS — Z00.00 ROUTINE GENERAL MEDICAL EXAMINATION AT A HEALTH CARE FACILITY: Primary | ICD-10-CM

## 2024-07-10 DIAGNOSIS — M79.662 PAIN OF LEFT CALF: ICD-10-CM

## 2024-07-10 DIAGNOSIS — G43.909 MIGRAINE WITHOUT STATUS MIGRAINOSUS, NOT INTRACTABLE, UNSPECIFIED MIGRAINE TYPE: ICD-10-CM

## 2024-07-10 DIAGNOSIS — G47.00 INSOMNIA, UNSPECIFIED TYPE: ICD-10-CM

## 2024-07-10 DIAGNOSIS — R39.15 URINARY URGENCY: ICD-10-CM

## 2024-07-10 DIAGNOSIS — M25.50 MULTIPLE JOINT PAIN: ICD-10-CM

## 2024-07-10 DIAGNOSIS — E66.01 MORBID OBESITY WITH BMI OF 40.0-44.9, ADULT (H): ICD-10-CM

## 2024-07-10 PROBLEM — F33.8 SEASONAL AFFECTIVE DISORDER (H): Status: RESOLVED | Noted: 2019-11-22 | Resolved: 2024-07-10

## 2024-07-10 LAB
ANION GAP SERPL CALCULATED.3IONS-SCNC: 8 MMOL/L (ref 7–15)
BUN SERPL-MCNC: 10.1 MG/DL (ref 6–20)
CALCIUM SERPL-MCNC: 9.1 MG/DL (ref 8.6–10)
CHLORIDE SERPL-SCNC: 105 MMOL/L (ref 98–107)
CHOLEST SERPL-MCNC: 183 MG/DL
CREAT SERPL-MCNC: 0.74 MG/DL (ref 0.51–0.95)
DEPRECATED HCO3 PLAS-SCNC: 25 MMOL/L (ref 22–29)
EGFRCR SERPLBLD CKD-EPI 2021: >90 ML/MIN/1.73M2
FASTING STATUS PATIENT QL REPORTED: YES
FASTING STATUS PATIENT QL REPORTED: YES
GLUCOSE SERPL-MCNC: 96 MG/DL (ref 70–99)
HDLC SERPL-MCNC: 61 MG/DL
LDLC SERPL CALC-MCNC: 102 MG/DL
NONHDLC SERPL-MCNC: 122 MG/DL
POTASSIUM SERPL-SCNC: 4.5 MMOL/L (ref 3.4–5.3)
SODIUM SERPL-SCNC: 138 MMOL/L (ref 135–145)
TRIGL SERPL-MCNC: 100 MG/DL

## 2024-07-10 PROCEDURE — 90746 HEPB VACCINE 3 DOSE ADULT IM: CPT | Performed by: NURSE PRACTITIONER

## 2024-07-10 PROCEDURE — 80048 BASIC METABOLIC PNL TOTAL CA: CPT | Performed by: NURSE PRACTITIONER

## 2024-07-10 PROCEDURE — 36415 COLL VENOUS BLD VENIPUNCTURE: CPT | Performed by: NURSE PRACTITIONER

## 2024-07-10 PROCEDURE — 90471 IMMUNIZATION ADMIN: CPT | Performed by: NURSE PRACTITIONER

## 2024-07-10 PROCEDURE — 99396 PREV VISIT EST AGE 40-64: CPT | Mod: 25 | Performed by: NURSE PRACTITIONER

## 2024-07-10 PROCEDURE — 99214 OFFICE O/P EST MOD 30 MIN: CPT | Mod: 25 | Performed by: NURSE PRACTITIONER

## 2024-07-10 PROCEDURE — 80061 LIPID PANEL: CPT | Performed by: NURSE PRACTITIONER

## 2024-07-10 RX ORDER — ZOLPIDEM TARTRATE 10 MG/1
TABLET ORAL
Qty: 30 TABLET | Refills: 5 | Status: SHIPPED | OUTPATIENT
Start: 2024-07-10

## 2024-07-10 RX ORDER — MELOXICAM 15 MG/1
15 TABLET ORAL DAILY
Qty: 90 TABLET | Refills: 3 | Status: SHIPPED | OUTPATIENT
Start: 2024-07-10

## 2024-07-10 RX ORDER — BUTALBITAL, ASPIRIN, AND CAFFEINE 325; 50; 40 MG/1; MG/1; MG/1
CAPSULE ORAL
Qty: 20 CAPSULE | Refills: 0 | Status: SHIPPED | OUTPATIENT
Start: 2024-07-10

## 2024-07-10 RX ORDER — TOLTERODINE 4 MG/1
4 CAPSULE, EXTENDED RELEASE ORAL DAILY
Qty: 90 CAPSULE | Refills: 3 | Status: SHIPPED | OUTPATIENT
Start: 2024-07-10

## 2024-07-10 ASSESSMENT — PAIN SCALES - GENERAL: PAINLEVEL: MODERATE PAIN (4)

## 2024-07-10 NOTE — NURSING NOTE
Prior to immunization administration, verified patients identity using patient s name and date of birth. Please see Immunization Activity for additional information.     Screening Questionnaire for Adult Immunization    Are you sick today?   No   Do you have allergies to medications, food, a vaccine component or latex?   Yes   Have you ever had a serious reaction after receiving a vaccination?   No   Do you have a long-term health problem with heart, lung, kidney, or metabolic disease (e.g., diabetes), asthma, a blood disorder, no spleen, complement component deficiency, a cochlear implant, or a spinal fluid leak?  Are you on long-term aspirin therapy?   No   Do you have cancer, leukemia, HIV/AIDS, or any other immune system problem?   No   Do you have a parent, brother, or sister with an immune system problem?   No   In the past 3 months, have you taken medications that affect  your immune system, such as prednisone, other steroids, or anticancer drugs; drugs for the treatment of rheumatoid arthritis, Crohn s disease, or psoriasis; or have you had radiation treatments?   No   Have you had a seizure, or a brain or other nervous system problem?   No   During the past year, have you received a transfusion of blood or blood    products, or been given immune (gamma) globulin or antiviral drug?   No   For women: Are you pregnant or is there a chance you could become       pregnant during the next month?   No   Have you received any vaccinations in the past 4 weeks?   No     Immunization questionnaire was positive for at least one answer.  Notified Nelli Nye NP.      Patient instructed to remain in clinic for 15 minutes afterwards, and to report any adverse reactions.     Screening performed by Samantha Rivera MA on 7/10/2024 at 10:20 AM.

## 2024-07-10 NOTE — PROGRESS NOTES
"Preventive Care Visit  [facility]  Nelli Nye, NP, Nurse Practitioner - Family  Jul 10, 2024      Assessment & Plan     (Z00.00) Routine general medical examination at a health care facility  (primary encounter diagnosis)  Comment:   Plan: Lipid panel reflex to direct LDL Fasting, Basic        metabolic panel  (Ca, Cl, CO2, Creat, Gluc, K,         Na, BUN)            (M79.662) Pain of left calf  Comment: chronic, unclear etiology  Plan: Orthopedic  Referral        Will refer to sports medicine for evaluation.      (G43.909) Migraine without status migrainosus, not intractable, unspecified migraine type  Comment: stable  Plan: butalbital-aspirin-caffeine (FIORINAL)         -40 MG capsule        The current medical regimen is effective;  continue present plan and medications.     (M25.50) Multiple joint pain  Comment:   Plan: meloxicam (MOBIC) 15 MG tablet, Basic metabolic        panel  (Ca, Cl, CO2, Creat, Gluc, K, Na, BUN)        Will increase dose of meloxicam to 15 mg.      (R39.15) Urinary urgency  Comment: stable  Plan: tolterodine ER (DETROL LA) 4 MG 24 hr capsule        The current medical regimen is effective;  continue present plan and medications.     (G47.00) Insomnia, unspecified type  Comment: stable  Plan: zolpidem (AMBIEN) 10 MG tablet        The current medical regimen is effective;  continue present plan and medications.     (E66.01,  Z68.41) Morbid obesity with BMI of 40.0-44.9, adult (H)  Comment:   Plan: Continue to work on diet and exercise, as weight loss may help joint pain.             BMI  Estimated body mass index is 43.41 kg/m  as calculated from the following:    Height as of this encounter: 1.68 m (5' 6.14\").    Weight as of this encounter: 122.5 kg (270 lb 1.6 oz).       Counseling  Appropriate preventive services were discussed with this patient, including applicable screening as appropriate for fall prevention, nutrition, physical " activity, Tobacco-use cessation, weight loss and cognition.  Checklist reviewing preventive services available has been given to the patient.  Reviewed patient's diet, addressing concerns and/or questions.   She is at risk for lack of exercise and has been provided with information to increase physical activity for the benefit of her well-being.           Ariela Mobley is a 49 year old, presenting for the following:  Physical        7/10/2024     9:20 AM   Additional Questions   Roomed by Marlin CARNEY   Accompanied by self        Health Care Directive  Patient does not have a Health Care Directive or Living Will: Discussed advance care planning with patient; information given to patient to review.    HPI  She continues to have left calf pain.  It occurs only when standing, seems to perhaps improve when she is walking and resolves when she sits.  Pain is more burning or almost like a charley horse.  She denies any swelling or redness or back pain.  She did go to PT, which did not help.      Meloxicam does help with more generalized joint pains, but wonders about increasing dose.     Detrol is helpful for urinary urgency.    She continues to need to take Ambien fairly consistently.    Migraines are stable.                7/5/2024   General Health   How would you rate your overall physical health? (!) FAIR   Feel stress (tense, anxious, or unable to sleep) To some extent      (!) STRESS CONCERN      7/5/2024   Nutrition   Three or more servings of calcium each day? Yes   Diet: Regular (no restrictions)   How many servings of fruit and vegetables per day? (!) 2-3   How many sweetened beverages each day? 0-1            7/5/2024   Exercise   Days per week of moderate/strenous exercise 3 days   Average minutes spent exercising at this level 30 min            7/5/2024   Social Factors   Frequency of gathering with friends or relatives Twice a week   Worry food won't last until get money to buy more No   Food not last or  not have enough money for food? No   Do you have housing? (Housing is defined as stable permanent housing and does not include staying ouside in a car, in a tent, in an abandoned building, in an overnight shelter, or couch-surfing.) Yes   Are you worried about losing your housing? No   Lack of transportation? No   Unable to get utilities (heat,electricity)? No            2024   Dental   Dentist two times every year? Yes            2024   TB Screening   Were you born outside of the [place]? No          Today's PHQ-9 Score:       2024     9:46 AM   PHQ-9 SCORE   PHQ-9 Total Score MyChart 4 (Minimal depression)   PHQ-9 Total Score 4         2024   Substance Use   Alcohol more than 3/day or more than 7/wk Not Applicable   Do you use any other substances recreationally? No        Social History     Tobacco Use    Smoking status: Former     Current packs/day: 0.00     Average packs/day: 0.5 packs/day for 13.1 years (6.5 ttl pk-yrs)     Types: Cigarettes, Clove cigarettes or kreteks     Start date: 1999     Quit date: 2012     Years since quittin.4    Smokeless tobacco: Never   Vaping Use    Vaping status: Never Used   Substance Use Topics    Alcohol use: Yes     Comment: rarely    Drug use: No           2023   LAST FHS-7 RESULTS   1st degree relative breast or ovarian cancer No   Any relative bilateral breast cancer No   Any male have breast cancer No   Any ONE woman have BOTH breast AND ovarian cancer No   Any woman with breast cancer before 50yrs No   2 or more relatives with breast AND/OR ovarian cancer No   2 or more relatives with breast AND/OR bowel cancer No                   2024   STI Screening   New sexual partner(s) since last STI/HIV test? No        History of abnormal Pap smear: No - age 30- 64 PAP with HPV every 5 years recommended        Latest Ref Rng & Units 2023    10:18 AM 2018     9:42 AM 2018     9:34 AM   PAP / HPV   PAP  Negative for Intraepithelial  "Lesion or Malignancy (NILM)      PAP (Historical)    NIL    HPV 16 DNA Negative Negative  Negative     HPV 18 DNA Negative Negative  Negative     Other HR HPV Negative Negative  Negative       ASCVD Risk   The 10-year ASCVD risk score (Gigi LAYTON, et al., 2019) is: 0.8%    Values used to calculate the score:      Age: 49 years      Sex: Female      Is Non- : No      Diabetic: No      Tobacco smoker: No      Systolic Blood Pressure: 127 mmHg      Is BP treated: No      HDL Cholesterol: 60 mg/dL      Total Cholesterol: 198 mg/dL        7/5/2024   Contraception/Family Planning   Questions about contraception or family planning No           Reviewed and updated as needed this visit by Provider   Tobacco  Allergies  Meds  Problems  Med Hx  Surg Hx  Fam Hx                     Objective    Exam  /83 (BP Location: Right arm, Patient Position: Sitting, Cuff Size: Adult Regular)   Pulse 73   Temp 97.5  F (36.4  C) (Temporal)   Resp 18   Ht 1.68 m (5' 6.14\")   Wt 122.5 kg (270 lb 1.6 oz)   LMP 06/20/2024 (Approximate)   SpO2 96%   BMI 43.41 kg/m     Estimated body mass index is 43.41 kg/m  as calculated from the following:    Height as of this encounter: 1.68 m (5' 6.14\").    Weight as of this encounter: 122.5 kg (270 lb 1.6 oz).    Physical Exam  GENERAL: alert and no distress  EYES: Eyes grossly normal to inspection, PERRL and conjunctivae and sclerae normal  HENT: ear canals and TM's normal, nose and mouth without ulcers or lesions  NECK: no adenopathy, no asymmetry, masses, or scars  RESP: lungs clear to auscultation - no rales, rhonchi or wheezes  BREAST: normal without masses, tenderness or nipple discharge and no palpable axillary masses or adenopathy  CV: regular rate and rhythm, normal S1 S2, no S3 or S4, no murmur, click or rub, no peripheral edema  ABDOMEN: soft, nontender, no hepatosplenomegaly, no masses and bowel sounds normal  MS: no gross musculoskeletal " defects noted, no edema  SKIN: no suspicious lesions or rashes  NEURO: Normal strength and tone, mentation intact and speech normal  PSYCH: mentation appears normal, affect normal/bright        Signed Electronically by: Nelli Nye NP    Answers submitted by the patient for this visit:  Patient Health Questionnaire (Submitted on 7/9/2024)  If you checked off any problems, how difficult have these problems made it for you to do your work, take care of things at home, or get along with other people?: Somewhat difficult  PHQ9 TOTAL SCORE: 4

## 2024-07-10 NOTE — PATIENT INSTRUCTIONS
Patient Education   Preventive Care Advice   This is general advice given by our system to help you stay healthy. However, your care team may have specific advice just for you. Please talk to your care team about your preventive care needs.  Nutrition  Eat 5 or more servings of fruits and vegetables each day.  Try wheat bread, brown rice and whole grain pasta (instead of white bread, rice, and pasta).  Get enough calcium and vitamin D. Check the label on foods and aim for 100% of the RDA (recommended daily allowance).  Lifestyle  Exercise at least 150 minutes each week  (30 minutes a day, 5 days a week).  Do muscle strengthening activities 2 days a week. These help control your weight and prevent disease.  No smoking.  Wear sunscreen to prevent skin cancer.  Have a dental exam and cleaning every 6 months.  Yearly exams  See your health care team every year to talk about:  Any changes in your health.  Any medicines your care team has prescribed.  Preventive care, family planning, and ways to prevent chronic diseases.  Shots (vaccines)   HPV shots (up to age 26), if you've never had them before.  Hepatitis B shots (up to age 59), if you've never had them before.  COVID-19 shot: Get this shot when it's due.  Flu shot: Get a flu shot every year.  Tetanus shot: Get a tetanus shot every 10 years.  Pneumococcal, hepatitis A, and RSV shots: Ask your care team if you need these based on your risk.  Shingles shot (for age 50 and up)  General health tests  Diabetes screening:  Starting at age 35, Get screened for diabetes at least every 3 years.  If you are younger than age 35, ask your care team if you should be screened for diabetes.  Cholesterol test: At age 39, start having a cholesterol test every 5 years, or more often if advised.  Bone density scan (DEXA): At age 50, ask your care team if you should have this scan for osteoporosis (brittle bones).  Hepatitis C: Get tested at least once in your life.  STIs (sexually  transmitted infections)  Before age 24: Ask your care team if you should be screened for STIs.  After age 24: Get screened for STIs if you're at risk. You are at risk for STIs (including HIV) if:  You are sexually active with more than one person.  You don't use condoms every time.  You or a partner was diagnosed with a sexually transmitted infection.  If you are at risk for HIV, ask about PrEP medicine to prevent HIV.  Get tested for HIV at least once in your life, whether you are at risk for HIV or not.  Cancer screening tests  Cervical cancer screening: If you have a cervix, begin getting regular cervical cancer screening tests starting at age 21.  Breast cancer scan (mammogram): If you've ever had breasts, begin having regular mammograms starting at age 40. This is a scan to check for breast cancer.  Colon cancer screening: It is important to start screening for colon cancer at age 45.  Have a colonoscopy test every 10 years (or more often if you're at risk) Or, ask your provider about stool tests like a FIT test every year or Cologuard test every 3 years.  To learn more about your testing options, visit:   .  For help making a decision, visit:   https://bit.ly/ig59657.  Prostate cancer screening test: If you have a prostate, ask your care team if a prostate cancer screening test (PSA) at age 55 is right for you.  Lung cancer screening: If you are a current or former smoker ages 50 to 80, ask your care team if ongoing lung cancer screenings are right for you.  For informational purposes only. Not to replace the advice of your health care provider. Copyright   2023 [employer]. All rights reserved. Clinically reviewed by the [facility]. [employer] [id number] - REV 01/24.

## 2024-09-16 ENCOUNTER — MYC MEDICAL ADVICE (OUTPATIENT)
Dept: FAMILY MEDICINE | Facility: CLINIC | Age: 49
End: 2024-09-16
Payer: COMMERCIAL

## 2024-09-16 ENCOUNTER — NURSE TRIAGE (OUTPATIENT)
Dept: FAMILY MEDICINE | Facility: CLINIC | Age: 49
End: 2024-09-16
Payer: COMMERCIAL

## 2024-09-16 DIAGNOSIS — U07.1 INFECTION DUE TO 2019 NOVEL CORONAVIRUS: Primary | ICD-10-CM

## 2024-09-16 NOTE — TELEPHONE ENCOUNTER
Writer responded via [employer].  NELA SantoroN, RN-BC  [facility]

## 2024-09-16 NOTE — TELEPHONE ENCOUNTER
Call received from patient:  Tested positive for COVID-19 today  Symptoms began 9/14/24  Symptoms are: sore throat, cold symptoms, exhaustion, low grade fever, cough, muscle aches  No chest pain nor difficulty breathing  No diarrhea nor vomiting  No headache  Has oximeter but it is not charged so unable to check oxygen level  No history of heart disease  History of asthma (not on problem list)    RN COVID TREATMENT VISIT  09/16/24    The patient has been triaged and does not require a higher level of care.    Leandra Chris  49 year old  Current weight? 260 lbs    Has the patient been seen by a primary care provider at an [facility] or [facility] within the past two years? Yes.   Have you been in close proximity to/do you have a known exposure to a person with a confirmed case of influenza? No.     General treatment eligibility:  Date of positive COVID test (PCR or at home)?  9/16/24    Are you or have you been hospitalized for this COVID-19 infection? No.   Have you received monoclonal antibodies or antiviral treatment for COVID-19 since this positive test? No.   Do you have any of the following conditions that place you at risk of being very sick from COVID-19?   - Mental health disorders including mood disorders, depression, schizophrenia spectrum disorders   - Overweight or Obesity (BMI >85th percentile or BMI 25 or higher)  Yes, patient has at least one high risk condition as noted above.     Current COVID symptoms:   - cough  - fatigue  - sore throat  - congestion or runny nose  Yes. Patient has at least one symptom as selected.     How many days since symptoms started? 5 days or less. Established patient, 12 years or older weighing at least 88.2 lbs, who has symptoms that started in the past 5 days, has not been hospitalized nor received treatment already, and is at risk for being very sick from COVID-19.     Treatment eligibility by RN:  Are you currently pregnant or nursing?  No  Do you have a clinically significant hypersensitivity to nirmatrelvir or ritonavir, or toxic epidermal necrolysis (TEN) or Pelayo-Yosef Syndrome? No  Do you have a history of hepatitis, any hepatic impairment on the Problem List (such as Child-Reed Class C, cirrhosis, fatty liver disease, alcoholic liver disease), or was the last liver lab (hepatic panel, ALT, AST, ALK Phos, bilirubin) elevated in the past 6 months? No  Do you have any history of severe renal impairment (eGFR < 30mL/min)? No    Is patient eligible to continue? Yes, patient meets all eligibility requirements for the RN COVID treatment (as denoted by all no responses above).     Current Outpatient Medications   Medication Sig Dispense Refill    Acetaminophen (TYLENOL PO) Take by mouth every 4 hours as needed for mild pain or fever Reported on 4/4/2017      albuterol (PROAIR HFA/PROVENTIL HFA/VENTOLIN HFA) 108 (90 BASE) MCG/ACT Inhaler Inhale 2 puffs into the lungs every 6 hours      ALPRAZolam (XANAX) 0.5 MG tablet Take 1-2 tablets 45 minutes prior to flight (Patient not taking: Reported on 6/13/2024) 20 tablet 0    butalbital-aspirin-caffeine (FIORINAL) -40 MG capsule TAKE 1 CAPSULE BY MOUTH EVERY 4 HOURS AS NEEDED FOR PAIN 20 capsule 0    ibuprofen (ADVIL/MOTRIN) 200 MG tablet Take 200 mg by mouth every 4 hours as needed for pain      meloxicam (MOBIC) 15 MG tablet Take 1 tablet (15 mg) by mouth daily 90 tablet 3    tolterodine ER (DETROL LA) 4 MG 24 hr capsule Take 1 capsule (4 mg) by mouth daily 90 capsule 3    zolpidem (AMBIEN) 10 MG tablet TAKE 1 tablet BY MOUTH EVERY NIGHT AS NEEDED FOR SLEEP. 30 tablet 5   No longer has Albuterol inhaler.     Medications from List 1 of the standing order (on medications that exclude the use of Paxlovid) that patient is taking: NONE. Is patient taking Chadron's Wort? No  Is patient taking Chadron's Wort or any meds from List 1? No.   Medications from List 2 of the standing order (on meds that  provider needs to adjust) that patient is taking: NONE. Is patient on any of the meds from List 2? No.   Medications from List 3 of standing order (on meds that a RN needs to adjust) that patient is taking: alprazolam (Xanax): Is patient taking as needed and less than daily? Yes, instructed patient to stop taking alprazolam while taking Paxlovid and restart alprazolam 3 days after the completion of Paxlovid.  zolpidem (Ambien, Intermezzo, Edluar): Is patient taking as needed and less than daily? Yes, instructed patient to stop taking zolpidem while taking Paxlovid and restart zolpidem 3 days after the completion of Paxlovid. Is patient on any meds from List 3? Yes. Patient is on meds from list 3. No meds require a provider visit and at least one med required RN to adjust.     Paxlovid has an approximate 90% reduction in hospitalization. Paxlovid can possibly cause altered sense of taste, diarrhea (loose, watery stools), high blood pressure, muscle aches.     Would patient like a Paxlovid prescription?   Yes.   Lab Results   Component Value Date    GFRESTIMATED >90 07/10/2024       Was last eGFR reduced? No, eGFR 60 or greater/ No Result on record. Patient can receive the normal renal function dose. Paxlovid Rx sent to [facility].    Temporary change to home medications: see above    All medication adjustments (holds, etc) were discussed with the patient and patient was asked to repeat back (teachback) their med adjustment.  Did patient understand med adjustment? Yes, patient repeated back and understood correctly.      Reviewed the following instructions with the patient:    Paxlovid (nimatrelvir and ritonavir)    How it works  Two medicines (nirmatrelvir and ritonavir) are taken together. They stop the virus from growing. Less amount of virus is easier for your body to fight.    How to take  Medicine comes in a daily container with both medicine tablets. Take by mouth twice daily (once in the morning, once  at night) for 5 days.  The number of tablets to take varies by patient.  Don't chew or break capsules. Swallow whole.    When to take  Take as soon as possible after positive COVID-19 test result, and within 5 days of your first symptoms.    Possible side effects  Can cause altered sense of taste, diarrhea (loose, watery stools), high blood pressure, muscle aches.    Encouraged patient to call back with any new, worsening or changing symptoms.  Registered Nurses are available by phone 24/7.    Patient verbalized understanding and in agreement with plan.    NELA SantoroN, RN-[facility]        Reason for Disposition   [1] HIGH RISK patient (e.g., weak immune system, age > 64 years, obesity with BMI 30 or higher, pregnant, chronic lung disease) AND [2] COVID symptoms (e.g., cough, fever)  (Exceptions: Already seen by PCP and no new or worsening symptoms.)    Additional Information   Negative: SEVERE difficulty breathing (e.g., struggling for each breath, speaks in single words)   Negative: Difficult to awaken or acting confused (e.g., disoriented, slurred speech)   Negative: Bluish (or gray) lips or face now   Negative: Shock suspected (e.g., cold/pale/clammy skin, too weak to stand, low BP, rapid pulse)   Negative: Sounds like a life-threatening emergency to the triager   Negative: [1] Diagnosed or suspected COVID-19 AND [2] symptoms lasting 3 or more weeks   Negative: [1] COVID-19 exposure AND [2] no symptoms   Negative: COVID-19 vaccine reaction suspected (e.g., fever, headache, muscle aches) occurring 1 to 3 days after getting vaccine   Negative: COVID-19 vaccine, questions about   Negative: [1] Exposure to someone known to have influenza (flu test positive) AND [2] flu-like symptoms (e.g., cough, runny nose, sore throat, SOB; with or without fever)   Negative: [1] Possible COVID-19 symptoms AND [2] triager concerned about severity of symptoms or other causes   Negative: COVID-19  and breastfeeding, questions about   Negative: SEVERE or constant chest pain or pressure  (Exception: Mild central chest pain, present only when coughing.)   Negative: MODERATE difficulty breathing (e.g., speaks in phrases, SOB even at rest, pulse 100-120)   Negative: [1] Headache AND [2] stiff neck (can't touch chin to chest)   Negative: Oxygen level (e.g., pulse oximetry) 90% or lower   Negative: Chest pain or pressure  (Exception: MILD central chest pain, present only when coughing.)   Negative: [1] Drinking very little AND [2] dehydration suspected (e.g., no urine > 12 hours, very dry mouth, very lightheaded)   Negative: Patient sounds very sick or weak to the triager   Negative: MILD difficulty breathing (e.g., minimal/no SOB at rest, SOB with walking, pulse <100)   Negative: Fever > 103 F (39.4 C)   Negative: [1] Fever > 101 F (38.3 C) AND [2] age > 60 years   Negative: [1] Fever > 100 F (37.8 C) AND [2] bedridden (e.g., CVA, chronic illness, recovering from surgery)   Negative: Oxygen level (e.g., pulse oximetry) 91 to 94%    Protocols used: COVID-19 - Diagnosed or [id number]

## 2024-09-18 ENCOUNTER — MYC MEDICAL ADVICE (OUTPATIENT)
Dept: FAMILY MEDICINE | Facility: CLINIC | Age: 49
End: 2024-09-18
Payer: COMMERCIAL

## 2024-09-18 NOTE — LETTER
[facility]  [address]  [address]  [place] [address]  [phone]        September 20, 2024    Regarding:  Leandra Chris  [address] [address]              To Whom It May Concern:  The above patient was diagnosed with COVID infection and is cleared to return to work 9/19/24.          Sincerely,        Nelli Nye NP/arl

## 2024-09-20 NOTE — TELEPHONE ENCOUNTER
Letter created.  Writer responded via [employer].  [place]ia AVALOS BSN, PHN, RN-[facility]  [phone]

## 2024-11-08 ENCOUNTER — OFFICE VISIT (OUTPATIENT)
Dept: FAMILY MEDICINE | Facility: CLINIC | Age: 49
End: 2024-11-08
Payer: COMMERCIAL

## 2024-11-08 VITALS
DIASTOLIC BLOOD PRESSURE: 74 MMHG | TEMPERATURE: 98.3 F | HEART RATE: 85 BPM | WEIGHT: 266.3 LBS | HEIGHT: 66 IN | SYSTOLIC BLOOD PRESSURE: 108 MMHG | RESPIRATION RATE: 13 BRPM | BODY MASS INDEX: 42.8 KG/M2 | OXYGEN SATURATION: 98 %

## 2024-11-08 DIAGNOSIS — J02.0 STREP PHARYNGITIS: ICD-10-CM

## 2024-11-08 DIAGNOSIS — R05.1 ACUTE COUGH: ICD-10-CM

## 2024-11-08 DIAGNOSIS — J02.9 SORE THROAT: Primary | ICD-10-CM

## 2024-11-08 LAB — DEPRECATED S PYO AG THROAT QL EIA: POSITIVE

## 2024-11-08 PROCEDURE — 87635 SARS-COV-2 COVID-19 AMP PRB: CPT | Performed by: NURSE PRACTITIONER

## 2024-11-08 PROCEDURE — 99214 OFFICE O/P EST MOD 30 MIN: CPT | Performed by: NURSE PRACTITIONER

## 2024-11-08 PROCEDURE — 87880 STREP A ASSAY W/OPTIC: CPT | Performed by: NURSE PRACTITIONER

## 2024-11-08 RX ORDER — CEFDINIR 300 MG/1
300 CAPSULE ORAL 2 TIMES DAILY
Qty: 20 CAPSULE | Refills: 0 | Status: SHIPPED | OUTPATIENT
Start: 2024-11-08 | End: 2024-11-18

## 2024-11-08 ASSESSMENT — ENCOUNTER SYMPTOMS: SORE THROAT: 1

## 2024-11-08 NOTE — PROGRESS NOTES
"  Assessment & Plan     Leandra was seen today for pharyngitis.    Diagnoses and all orders for this visit:    Sore throat  -     Streptococcus A Rapid Screen w/Reflex to PCR    Strep pharyngitis  Has allergies to penicillin. Has done well with cephalosporins in the past. Will treat with cefdinir.  -     cefdinir (OMNICEF) 300 MG capsule; Take 1 capsule (300 mg) by mouth 2 times daily for 10 days.    Acute cough  -     ipratropium-albuterol (COMBIVENT RESPIMAT)  MCG/ACT inhaler; Inhale 1 puff into the lungs 4 times daily.  -     Symptomatic COVID-19 Virus (Coronavirus) by PCR  Ongoing for 2 weeks-   In no respiratory distress, afebrile, 02 sats 98% r/a, lung sounds clear.  Will start inhaler as above as hx of wheezing with respiratory infections. No fever and lung sounds are clear, will defer CXR for now. Denies GERD.  She will get treated with cefdinir for streph pharyngitis as above which will provide coverage for pneumonia and bronchitis as well.   If symptoms worsen or fail to improve in the next 3-7 days RTC -      BMI  Estimated body mass index is 42.98 kg/m  as calculated from the following:    Height as of this encounter: 1.676 m (5' 6\").    Weight as of this encounter: 120.8 kg (266 lb 4.8 oz).             Subjective   Leandra is a 49 year old, presenting for the following health issues:  Pharyngitis (Rates pain 6, has been taking ibuprofen for pain. Had Covid about 6 weeks ago so did not test. )        11/8/2024    12:57 PM   Additional Questions   Roomed by VALERY Magana     Cold and cough for 2 weeks  Sore throat for 2 days-   No shortness of breath, or wheezing. Painful to swallow.  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 Still congested.  Taking ibuprofen prn      History of Present Illness       Reason for visit:  Sorr throat - have been exposed to strep  Symptom onset:  3-7 days ago  Symptoms include:  Sore throat  Symptom intensity:  Severe  Symptom " "progression:  Worsening  Had these symptoms before:  Yes  Has tried/received treatment for these symptoms:  Yes  Previous treatment was successful:  Yes  Prior treatment description:  Antibiotics  What makes it worse:  Swallowing  What makes it better:  Advil   She is taking medications regularly.                     Objective    /74 (BP Location: Left arm, Patient Position: Sitting, Cuff Size: Adult Regular)   Pulse 85   Temp 98.3  F (36.8  C) (Oral)   Resp 13   Ht 1.676 m (5' 6\")   Wt 120.8 kg (266 lb 4.8 oz)   LMP 10/17/2024 (Approximate)   SpO2 98%   BMI 42.98 kg/m    Body mass index is 42.98 kg/m .  Physical Exam   GENERAL: alert and no distress  NECK: no adenopathy, no asymmetry, masses, or scars  RESP: lungs clear to auscultation - no rales, rhonchi or wheezes  CV: regular rate and rhythm, normal S1 S2, no S3 or S4, no murmur, click or rub, no peripheral edema  ABDOMEN: soft, nontender, no hepatosplenomegaly, no masses and bowel sounds normal  MS: no gross musculoskeletal defects noted, no edema            Signed Electronically by: EDISON Anand CNP    "

## 2024-11-09 LAB — SARS-COV-2 RNA RESP QL NAA+PROBE: POSITIVE

## 2024-11-10 ENCOUNTER — NURSE TRIAGE (OUTPATIENT)
Dept: NURSING | Facility: CLINIC | Age: 49
End: 2024-11-10
Payer: COMMERCIAL

## 2024-11-10 ENCOUNTER — TELEPHONE (OUTPATIENT)
Dept: NURSING | Facility: CLINIC | Age: 49
End: 2024-11-10
Payer: COMMERCIAL

## 2024-11-10 NOTE — TELEPHONE ENCOUNTER
Leandra has strep throat and is taking an abx for this.    She just received a call stating she also tested positive for covid.  Leandra had covid 7 weeks ago.  She said she doesn't feel like she has covid.  Her sore throat has improved since starting her abx  She did a home covid test this a.m. and result was negative.  She'd rather not take Paxlovid.  I told her some people will test positive for covid for up to 90 days after having it.  I told her too that we occasionally see rebound covid.  Caller stated understanding and agreement.    She does not want to start Paxlovid at this time.  She will call back if she changes her mind.    Marissa LOREDO RN [facility]

## 2024-11-10 NOTE — TELEPHONE ENCOUNTER
Coronavirus (COVID-19) Notification    Caller Name (Patient, parent, daughter/son, grandparent, etc)  Pt     Reason for call  Notify of Positive Coronavirus (COVID-19) lab results, assess symptoms,  review [facility] recommendations    Lab Result    Lab test:  2019-nCoV rRt-PCR or SARS-CoV-2 PCR    Oropharyngeal AND/OR nasopharyngeal swabs is POSITIVE for 2019-nCoV RNA/SARS-COV-2 PCR (COVID-19 virus)      Gather patient reported symptoms   Assessment   Current Symptoms at time of phone call, reported by patient: (if no symptoms, document: No symptoms]  Strep    Date of symptom(s) onset (if applicable) 2 weeks cold and cough     If at time of call, Patients symptoms have worsened, the Patient should contact 911 or have someone drive them to Emergency Dept promptly:    If Patient calling 911, inform 911 personal that you have tested positive for the Coronavirus (COVID-19).  Place mask on and await 911 to arrive.  If Emergency Dept, If possible, please have another adult drive you to the Emergency Dept but you need to wear mask when in contact with other people.      Treatment Options:   Is patient interested in discussing COVID treatment?  Well like to talk to a nurse first, transferring over to a nurse         Review information with Patient    Your result was positive. This means you have COVID-19 (coronavirus).    How can I protect others?    These guidelines are for isolating before returning to work, school or .    If you DO have symptoms  Stay home and away from others   For at least 5 days after your symptoms started, AND  You are fever free for 24 hours (with no medicine that reduces fever), AND  Your other symptoms are better  Wear a mask for 10 full days anytime you are around others    If you DON'T have symptoms  Stay home and away from others for at least 5 days after your positive test  Wear a mask for 10 full days anytime you are around others    There may be different guidelines for  healthcare facilities.  Please check with the specific sites before arriving.    If you have been told by a doctor that you were severely ill with COVID-19 or are immunocompromised, you should isolate for at least 10 days.    You should not go back to work until you meet the guidelines above for ending your home isolation. You don't need to be retested for COVID-19 before going back to work--studies show that you won't spread the virus if it's been at least 10 days since your symptoms started (or 20 days, if you have a weak immune system).    Employers, schools, and daycares: This is an official notice for this person's medical guidelines for returning in-person.  They must meet the above guidelines before going back to work, school or  in person.    You will receive a positive COVID-19 letter via Ghost or the mail soon with additional self-care information.    Would you like me to review some of that information with you now?  No- want to talk to a nurse first     If you were tested for an upcoming procedure, please contact your provider for next steps.    True Bell MA

## 2024-11-11 RX ORDER — IPRATROPIUM BROMIDE AND ALBUTEROL 20; 100 UG/1; UG/1
SPRAY, METERED RESPIRATORY (INHALATION)
Qty: 4 G | OUTPATIENT
Start: 2024-11-11

## 2024-12-20 ENCOUNTER — TRANSFERRED RECORDS (OUTPATIENT)
Dept: HEALTH INFORMATION MANAGEMENT | Facility: CLINIC | Age: 49
End: 2024-12-20
Payer: COMMERCIAL

## 2024-12-23 ENCOUNTER — DOCUMENTATION ONLY (OUTPATIENT)
Dept: SLEEP MEDICINE | Facility: CLINIC | Age: 49
End: 2024-12-23
Payer: COMMERCIAL

## 2024-12-23 DIAGNOSIS — G47.33 OBSTRUCTIVE SLEEP APNEA: Primary | ICD-10-CM

## 2025-04-17 ENCOUNTER — TRANSFERRED RECORDS (OUTPATIENT)
Dept: HEALTH INFORMATION MANAGEMENT | Facility: CLINIC | Age: 50
End: 2025-04-17
Payer: COMMERCIAL

## 2025-04-24 DIAGNOSIS — G43.909 MIGRAINE WITHOUT STATUS MIGRAINOSUS, NOT INTRACTABLE, UNSPECIFIED MIGRAINE TYPE: ICD-10-CM

## 2025-04-25 RX ORDER — BUTALBITAL, ASPIRIN, AND CAFFEINE 325; 50; 40 MG/1; MG/1; MG/1
CAPSULE ORAL
Qty: 20 CAPSULE | Refills: 0 | Status: SHIPPED | OUTPATIENT
Start: 2025-04-25

## 2025-05-19 ASSESSMENT — PATIENT HEALTH QUESTIONNAIRE - PHQ9
SUM OF ALL RESPONSES TO PHQ QUESTIONS 1-9: 9
10. IF YOU CHECKED OFF ANY PROBLEMS, HOW DIFFICULT HAVE THESE PROBLEMS MADE IT FOR YOU TO DO YOUR WORK, TAKE CARE OF THINGS AT HOME, OR GET ALONG WITH OTHER PEOPLE: SOMEWHAT DIFFICULT
SUM OF ALL RESPONSES TO PHQ QUESTIONS 1-9: 9

## 2025-05-20 ENCOUNTER — VIRTUAL VISIT (OUTPATIENT)
Dept: FAMILY MEDICINE | Facility: CLINIC | Age: 50
End: 2025-05-20
Payer: COMMERCIAL

## 2025-05-20 DIAGNOSIS — R07.89 ATYPICAL CHEST PAIN: Primary | ICD-10-CM

## 2025-05-20 PROCEDURE — 98006 SYNCH AUDIO-VIDEO EST MOD 30: CPT | Performed by: NURSE PRACTITIONER

## 2025-05-20 NOTE — PROGRESS NOTES
"Leandra is a 50 year old who is being evaluated via a billable video visit.          Assessment & Plan     (R07.89) Atypical chest pain  (primary encounter diagnosis)  Comment:   Plan: Echocardiogram Exercise Stress        Will get a stress echo to evaluate for cardiac etiology.  If normal, symptoms are likely related to stress, anxiety.  If she develops more frequent episodes of palpitations, will get a Zio Patch.     The longitudinal plan of care for the diagnosis(es)/condition(s) as documented were addressed during this visit. Due to the added complexity in care, I will continue to support Leandra in the subsequent management and with ongoing continuity of care.      I spent a total of 30 minutes on the day of the visit.   Time spent by me today doing chart review, history and exam, documentation and further activities per the note        BMI  Estimated body mass index is 42.98 kg/m  as calculated from the following:    Height as of 11/8/24: 1.676 m (5' 6\").    Weight as of 11/8/24: 120.8 kg (266 lb 4.8 oz).             Subjective   Leandra is a 50 year old, presenting for the following health issues:  No chief complaint on file.    History of Present Illness       Reason for visit:  Heart palpitations  Symptom onset:  3-4 weeks ago  Symptoms include:  It feels like skipped beats, heart rate is in 50 s sometimes, dizzy, and lightheaded sometimes  Symptom progression:  Staying the same  Had these symptoms before:  No  What makes it worse:  Not sure  What makes it better:  Not sure   She is taking medications regularly.      She has strep throat four times this year.  The last episode, she went to [facility] and was evaluated for chest pain with an EKG and troponin level, which were normal.  Yesterday, she was having palpitations and felt almost faint.  She did not have any shortness of breath.  This episode lasted for about 5 minutes.  She will have episodes of chest tightness about three times a week, lasts " up to an hour for the past couple of months.  Typically occur at rest.  She is under a lot of stress and her wife is currently having heart issues, so she is wondering if this could be psychosomatic.  She did have a Zio Patch, echo and stress test 12/21 for palpitations and chest pain, which were normal.  She does not have risk factors such as smoking, hypertension, diabetes, hyperlipidemia or family history of premature heart disease.                 Objective           Vitals:  No vitals were obtained today due to virtual visit.    Physical Exam   GENERAL: alert and no distress  EYES: Eyes grossly normal to inspection.  No discharge or erythema, or obvious scleral/conjunctival abnormalities.  RESP: No audible wheeze, cough, or visible cyanosis.    SKIN: Visible skin clear. No significant rash, abnormal pigmentation or lesions.  NEURO: Cranial nerves grossly intact.  Mentation and speech appropriate for age.  PSYCH: Appropriate affect, tone, and pace of words          Video-Visit Details    Type of service:  Video Visit   Originating Location (pt. Location): Home    Distant Location (provider location):  On-site  Platform used for Video Visit: Danielle  Signed Electronically by: Nelli Nye NP

## 2025-05-24 ENCOUNTER — OFFICE VISIT (OUTPATIENT)
Dept: URGENT CARE | Facility: URGENT CARE | Age: 50
End: 2025-05-24
Payer: COMMERCIAL

## 2025-05-24 ENCOUNTER — RESULTS FOLLOW-UP (OUTPATIENT)
Dept: INTERNAL MEDICINE | Facility: CLINIC | Age: 50
End: 2025-05-24

## 2025-05-24 VITALS
OXYGEN SATURATION: 96 % | HEART RATE: 69 BPM | TEMPERATURE: 98.1 F | WEIGHT: 272 LBS | RESPIRATION RATE: 22 BRPM | SYSTOLIC BLOOD PRESSURE: 119 MMHG | DIASTOLIC BLOOD PRESSURE: 73 MMHG | BODY MASS INDEX: 43.9 KG/M2

## 2025-05-24 DIAGNOSIS — Z20.818 EXPOSURE TO STREP THROAT: Primary | ICD-10-CM

## 2025-05-24 LAB
DEPRECATED S PYO AG THROAT QL EIA: NEGATIVE
S PYO DNA THROAT QL NAA+PROBE: NOT DETECTED

## 2025-05-24 PROCEDURE — 87651 STREP A DNA AMP PROBE: CPT | Performed by: NURSE PRACTITIONER

## 2025-05-24 PROCEDURE — 3078F DIAST BP <80 MM HG: CPT | Performed by: NURSE PRACTITIONER

## 2025-05-24 PROCEDURE — 3074F SYST BP LT 130 MM HG: CPT | Performed by: NURSE PRACTITIONER

## 2025-05-24 PROCEDURE — 99213 OFFICE O/P EST LOW 20 MIN: CPT | Performed by: NURSE PRACTITIONER

## 2025-05-24 RX ORDER — AZITHROMYCIN 500 MG/1
500 TABLET, FILM COATED ORAL DAILY
Qty: 5 TABLET | Refills: 0 | Status: SHIPPED | OUTPATIENT
Start: 2025-05-24

## 2025-05-24 NOTE — PROGRESS NOTES
"  Assessment & Plan   Problem List Items Addressed This Visit    None  Visit Diagnoses         Exposure to strep throat    -  Primary    Relevant Medications    azithromycin (ZITHROMAX) 500 MG tablet    Other Relevant Orders    Streptococcus A Rapid Screen w/Reflex to PCR - Clinic Collect (Completed)    Group A Streptococcus PCR Throat Swab           Quick strep is negative awaiting backup though to family members are positive patient is currently symptomatic.  Did discuss with patient follow-up in primary care given continued reoccurrence of strep throat in their household       BMI  Estimated body mass index is 43.9 kg/m  as calculated from the following:    Height as of 11/8/24: 1.676 m (5' 6\").    Weight as of this encounter: 123.4 kg (272 lb).           Ariela Mobley is a 50 year old, presenting for the following health issues:  Pharyngitis (Has had strep 4 times this year and feels like it is back again)      5/24/2025     9:51 AM   Additional Questions   Roomed by nilda haley cma     HPI                Review of Systems  Constitutional, HEENT, cardiovascular, pulmonary, GI, , musculoskeletal, neuro, skin, endocrine and psych systems are negative, except as otherwise noted.      Objective    /73 (BP Location: Right arm, Patient Position: Sitting, Cuff Size: Adult Large)   Pulse 69   Temp 98.1  F (36.7  C) (Tympanic)   Resp 22   Wt 123.4 kg (272 lb)   SpO2 96%   BMI 43.90 kg/m    Body mass index is 43.9 kg/m .  Physical Exam   GENERAL: alert and no distress  EYES: Eyes grossly normal to inspection,  conjunctivae and sclerae normal  HENT: ear canals and TM's normal, nose and mouth without ulcers or lesions post pharynx mild erythema without exudate  NECK: no adenopathy  RESP: Respirations regular nonlabored  PSYCH: mentation appears normal, affect normal/bright            Signed Electronically by: Joann Rod NP    "

## 2025-08-03 SDOH — HEALTH STABILITY: PHYSICAL HEALTH: ON AVERAGE, HOW MANY MINUTES DO YOU ENGAGE IN EXERCISE AT THIS LEVEL?: 30 MIN

## 2025-08-03 SDOH — HEALTH STABILITY: PHYSICAL HEALTH: ON AVERAGE, HOW MANY DAYS PER WEEK DO YOU ENGAGE IN MODERATE TO STRENUOUS EXERCISE (LIKE A BRISK WALK)?: 5 DAYS

## 2025-08-03 ASSESSMENT — SOCIAL DETERMINANTS OF HEALTH (SDOH): HOW OFTEN DO YOU GET TOGETHER WITH FRIENDS OR RELATIVES?: ONCE A WEEK

## 2025-08-04 ENCOUNTER — TELEPHONE (OUTPATIENT)
Dept: FAMILY MEDICINE | Facility: CLINIC | Age: 50
End: 2025-08-04
Payer: COMMERCIAL

## 2025-08-04 DIAGNOSIS — G43.909 MIGRAINE WITHOUT STATUS MIGRAINOSUS, NOT INTRACTABLE, UNSPECIFIED MIGRAINE TYPE: ICD-10-CM

## 2025-08-04 DIAGNOSIS — G47.00 INSOMNIA, UNSPECIFIED TYPE: ICD-10-CM

## 2025-08-04 RX ORDER — ZOLPIDEM TARTRATE 10 MG/1
TABLET ORAL
Qty: 30 TABLET | Refills: 5 | OUTPATIENT
Start: 2025-08-04

## 2025-08-04 RX ORDER — BUTALBITAL, ASPIRIN, AND CAFFEINE 325; 50; 40 MG/1; MG/1; MG/1
CAPSULE ORAL
Qty: 20 CAPSULE | Refills: 0 | OUTPATIENT
Start: 2025-08-04

## 2025-08-05 ASSESSMENT — PATIENT HEALTH QUESTIONNAIRE - PHQ9
SUM OF ALL RESPONSES TO PHQ QUESTIONS 1-9: 7
SUM OF ALL RESPONSES TO PHQ QUESTIONS 1-9: 7
10. IF YOU CHECKED OFF ANY PROBLEMS, HOW DIFFICULT HAVE THESE PROBLEMS MADE IT FOR YOU TO DO YOUR WORK, TAKE CARE OF THINGS AT HOME, OR GET ALONG WITH OTHER PEOPLE: SOMEWHAT DIFFICULT

## 2025-08-06 ENCOUNTER — OFFICE VISIT (OUTPATIENT)
Dept: FAMILY MEDICINE | Facility: CLINIC | Age: 50
End: 2025-08-06
Payer: COMMERCIAL

## 2025-08-06 VITALS
HEIGHT: 67 IN | SYSTOLIC BLOOD PRESSURE: 120 MMHG | HEART RATE: 72 BPM | BODY MASS INDEX: 43.27 KG/M2 | OXYGEN SATURATION: 98 % | TEMPERATURE: 97.1 F | DIASTOLIC BLOOD PRESSURE: 74 MMHG | RESPIRATION RATE: 18 BRPM | WEIGHT: 275.7 LBS

## 2025-08-06 DIAGNOSIS — G47.00 INSOMNIA, UNSPECIFIED TYPE: ICD-10-CM

## 2025-08-06 DIAGNOSIS — Z00.00 ROUTINE GENERAL MEDICAL EXAMINATION AT A HEALTH CARE FACILITY: Primary | ICD-10-CM

## 2025-08-06 DIAGNOSIS — E66.813 CLASS 3 OBESITY (H): ICD-10-CM

## 2025-08-06 DIAGNOSIS — M25.50 MULTIPLE JOINT PAIN: ICD-10-CM

## 2025-08-06 DIAGNOSIS — G43.909 MIGRAINE WITHOUT STATUS MIGRAINOSUS, NOT INTRACTABLE, UNSPECIFIED MIGRAINE TYPE: ICD-10-CM

## 2025-08-06 DIAGNOSIS — R39.15 URINARY URGENCY: ICD-10-CM

## 2025-08-06 PROBLEM — E66.01 MORBID OBESITY WITH BMI OF 40.0-44.9, ADULT (H): Status: RESOLVED | Noted: 2017-04-04 | Resolved: 2025-08-06

## 2025-08-06 PROCEDURE — 1126F AMNT PAIN NOTED NONE PRSNT: CPT | Performed by: NURSE PRACTITIONER

## 2025-08-06 PROCEDURE — 99396 PREV VISIT EST AGE 40-64: CPT | Performed by: NURSE PRACTITIONER

## 2025-08-06 PROCEDURE — 3078F DIAST BP <80 MM HG: CPT | Performed by: NURSE PRACTITIONER

## 2025-08-06 PROCEDURE — 99214 OFFICE O/P EST MOD 30 MIN: CPT | Mod: 25 | Performed by: NURSE PRACTITIONER

## 2025-08-06 PROCEDURE — G2211 COMPLEX E/M VISIT ADD ON: HCPCS | Performed by: NURSE PRACTITIONER

## 2025-08-06 PROCEDURE — 3074F SYST BP LT 130 MM HG: CPT | Performed by: NURSE PRACTITIONER

## 2025-08-06 RX ORDER — TOLTERODINE 4 MG/1
4 CAPSULE, EXTENDED RELEASE ORAL DAILY
Qty: 90 CAPSULE | Refills: 3 | Status: SHIPPED | OUTPATIENT
Start: 2025-08-06

## 2025-08-06 ASSESSMENT — PAIN SCALES - GENERAL: PAINLEVEL_OUTOF10: NO PAIN (0)

## 2025-08-23 ENCOUNTER — HEALTH MAINTENANCE LETTER (OUTPATIENT)
Age: 50
End: 2025-08-23

## (undated) DEVICE — SOL WATER IRRIG 1000ML BOTTLE 2F7114

## (undated) DEVICE — TUBING SMOKE EVAC 2.2CMX3M SEA3715

## (undated) DEVICE — PACK RECTAL UMMC

## (undated) DEVICE — SPONGE RAY-TEC 4X8" 7318

## (undated) DEVICE — LINEN TOWEL PACK X5 5464

## (undated) DEVICE — PUNCH SKIN 4MM P450

## (undated) DEVICE — PANTIES MESH LG/XLG 2PK 706M2

## (undated) DEVICE — SOL HYDROGEN PEROXIDE 3% 4OZ BOTTLE F0010

## (undated) DEVICE — APPLICATORS COTTON TIP 6"X2 STERILE LF C15053-006

## (undated) DEVICE — PUNCH SKIN 3MM P350

## (undated) DEVICE — SWAB PROCTO 16" 2/PK 32-046

## (undated) DEVICE — SUCTION MANIFOLD NEPTUNE 2 SYS 4 PORT 0702-020-000

## (undated) DEVICE — DRSG GAUZE 4X4" 8044

## (undated) RX ORDER — LIDOCAINE HYDROCHLORIDE 10 MG/ML
INJECTION, SOLUTION EPIDURAL; INFILTRATION; INTRACAUDAL; PERINEURAL
Status: DISPENSED
Start: 2024-02-12

## (undated) RX ORDER — BUPIVACAINE HYDROCHLORIDE AND EPINEPHRINE 5; 5 MG/ML; UG/ML
INJECTION, SOLUTION EPIDURAL; INTRACAUDAL; PERINEURAL
Status: DISPENSED
Start: 2024-02-12

## (undated) RX ORDER — FENTANYL CITRATE 50 UG/ML
INJECTION, SOLUTION INTRAMUSCULAR; INTRAVENOUS
Status: DISPENSED
Start: 2024-01-08

## (undated) RX ORDER — DIPHENHYDRAMINE HYDROCHLORIDE 50 MG/ML
INJECTION INTRAMUSCULAR; INTRAVENOUS
Status: DISPENSED
Start: 2024-01-08